# Patient Record
Sex: FEMALE | Race: WHITE | Employment: OTHER | URBAN - NONMETROPOLITAN AREA
[De-identification: names, ages, dates, MRNs, and addresses within clinical notes are randomized per-mention and may not be internally consistent; named-entity substitution may affect disease eponyms.]

---

## 2017-06-15 RX ORDER — DONEPEZIL HYDROCHLORIDE 10 MG/1
TABLET, FILM COATED ORAL
Qty: 30 TABLET | Refills: 3 | Status: SHIPPED | OUTPATIENT
Start: 2017-06-15 | End: 2017-10-18 | Stop reason: SDUPTHER

## 2017-06-22 RX ORDER — GABAPENTIN 300 MG/1
CAPSULE ORAL
Qty: 60 CAPSULE | Refills: 0 | Status: SHIPPED | OUTPATIENT
Start: 2017-06-22 | End: 2017-07-24 | Stop reason: SDUPTHER

## 2017-07-13 ENCOUNTER — TELEPHONE (OUTPATIENT)
Dept: INTERNAL MEDICINE | Age: 82
End: 2017-07-13

## 2017-07-14 ENCOUNTER — OFFICE VISIT (OUTPATIENT)
Dept: INTERNAL MEDICINE | Age: 82
End: 2017-07-14
Payer: MEDICARE

## 2017-07-14 VITALS
HEIGHT: 60 IN | WEIGHT: 159 LBS | RESPIRATION RATE: 18 BRPM | SYSTOLIC BLOOD PRESSURE: 122 MMHG | TEMPERATURE: 98.7 F | DIASTOLIC BLOOD PRESSURE: 74 MMHG | HEART RATE: 77 BPM | OXYGEN SATURATION: 94 % | BODY MASS INDEX: 31.22 KG/M2

## 2017-07-14 DIAGNOSIS — L89.90 DECUBITUS ULCER, UNSPECIFIED PRESSURE ULCER STAGE: ICD-10-CM

## 2017-07-14 DIAGNOSIS — B37.2 CANDIDIASIS OF SKIN: Primary | ICD-10-CM

## 2017-07-14 PROCEDURE — 1036F TOBACCO NON-USER: CPT | Performed by: NURSE PRACTITIONER

## 2017-07-14 PROCEDURE — G8417 CALC BMI ABV UP PARAM F/U: HCPCS | Performed by: NURSE PRACTITIONER

## 2017-07-14 PROCEDURE — G8400 PT W/DXA NO RESULTS DOC: HCPCS | Performed by: NURSE PRACTITIONER

## 2017-07-14 PROCEDURE — 1090F PRES/ABSN URINE INCON ASSESS: CPT | Performed by: NURSE PRACTITIONER

## 2017-07-14 PROCEDURE — 1123F ACP DISCUSS/DSCN MKR DOCD: CPT | Performed by: NURSE PRACTITIONER

## 2017-07-14 PROCEDURE — 4040F PNEUMOC VAC/ADMIN/RCVD: CPT | Performed by: NURSE PRACTITIONER

## 2017-07-14 PROCEDURE — G8427 DOCREV CUR MEDS BY ELIG CLIN: HCPCS | Performed by: NURSE PRACTITIONER

## 2017-07-14 PROCEDURE — 99213 OFFICE O/P EST LOW 20 MIN: CPT | Performed by: NURSE PRACTITIONER

## 2017-07-14 RX ORDER — NYSTATIN 100000 U/G
CREAM TOPICAL
Qty: 1 TUBE | Refills: 3 | Status: SHIPPED | OUTPATIENT
Start: 2017-07-14 | End: 2018-02-26 | Stop reason: CLARIF

## 2017-07-14 RX ORDER — OXYCODONE AND ACETAMINOPHEN 7.5; 325 MG/1; MG/1
1 TABLET ORAL EVERY 6 HOURS PRN
COMMUNITY

## 2017-07-14 RX ORDER — POTASSIUM CHLORIDE 20 MEQ/1
20 TABLET, EXTENDED RELEASE ORAL DAILY
COMMUNITY
End: 2017-08-23 | Stop reason: SDUPTHER

## 2017-07-14 RX ORDER — CHOLECALCIFEROL (VITAMIN D3) 1250 MCG
1 CAPSULE ORAL WEEKLY
COMMUNITY
End: 2017-08-04 | Stop reason: SDUPTHER

## 2017-07-14 RX ORDER — ALENDRONATE SODIUM 70 MG/1
70 TABLET ORAL
COMMUNITY
End: 2018-02-26 | Stop reason: CLARIF

## 2017-07-14 RX ORDER — NYSTATIN 100000 [USP'U]/G
POWDER TOPICAL
Qty: 1 BOTTLE | Refills: 3 | Status: SHIPPED | OUTPATIENT
Start: 2017-07-14

## 2017-07-14 RX ORDER — FLUCONAZOLE 100 MG/1
100 TABLET ORAL DAILY
Qty: 2 TABLET | Refills: 0 | Status: SHIPPED | OUTPATIENT
Start: 2017-07-14 | End: 2017-07-16

## 2017-07-14 RX ORDER — QUETIAPINE FUMARATE 25 MG/1
25 TABLET, FILM COATED ORAL NIGHTLY
COMMUNITY
End: 2017-08-17 | Stop reason: SDUPTHER

## 2017-07-14 ASSESSMENT — ENCOUNTER SYMPTOMS
SORE THROAT: 0
WHEEZING: 0
ABDOMINAL PAIN: 0
STRIDOR: 0
ABDOMINAL DISTENTION: 0
CHOKING: 0
COUGH: 0
CONSTIPATION: 0
COLOR CHANGE: 0
EYE ITCHING: 0
NAUSEA: 0
BLOOD IN STOOL: 0
DIARRHEA: 0
SHORTNESS OF BREATH: 0
TROUBLE SWALLOWING: 0
VOMITING: 0
EYE DISCHARGE: 0

## 2017-07-14 ASSESSMENT — PATIENT HEALTH QUESTIONNAIRE - PHQ9
1. LITTLE INTEREST OR PLEASURE IN DOING THINGS: 0
2. FEELING DOWN, DEPRESSED OR HOPELESS: 0
SUM OF ALL RESPONSES TO PHQ QUESTIONS 1-9: 0
SUM OF ALL RESPONSES TO PHQ9 QUESTIONS 1 & 2: 0

## 2017-07-25 RX ORDER — GABAPENTIN 300 MG/1
CAPSULE ORAL
Qty: 60 CAPSULE | Refills: 0 | Status: SHIPPED | OUTPATIENT
Start: 2017-07-25 | End: 2017-08-24 | Stop reason: SDUPTHER

## 2017-08-04 RX ORDER — CHOLECALCIFEROL (VITAMIN D3) 1250 MCG
1 CAPSULE ORAL WEEKLY
Qty: 8 CAPSULE | Refills: 1 | Status: SHIPPED | OUTPATIENT
Start: 2017-08-04

## 2017-08-17 RX ORDER — QUETIAPINE FUMARATE 25 MG/1
TABLET, FILM COATED ORAL
Qty: 90 TABLET | Refills: 0 | Status: SHIPPED | OUTPATIENT
Start: 2017-08-17 | End: 2017-08-29 | Stop reason: SDUPTHER

## 2017-08-24 RX ORDER — GABAPENTIN 300 MG/1
CAPSULE ORAL
Qty: 60 CAPSULE | Refills: 0 | Status: SHIPPED | OUTPATIENT
Start: 2017-08-24 | End: 2017-09-21 | Stop reason: SDUPTHER

## 2017-08-24 RX ORDER — POTASSIUM CHLORIDE 20 MEQ/1
TABLET, EXTENDED RELEASE ORAL
Qty: 90 TABLET | Refills: 0 | Status: SHIPPED | OUTPATIENT
Start: 2017-08-24 | End: 2017-11-22 | Stop reason: SDUPTHER

## 2017-08-29 RX ORDER — QUETIAPINE FUMARATE 25 MG/1
TABLET, FILM COATED ORAL
Qty: 90 TABLET | Refills: 1 | Status: SHIPPED | OUTPATIENT
Start: 2017-08-29

## 2017-09-11 ENCOUNTER — TELEPHONE (OUTPATIENT)
Dept: INTERNAL MEDICINE | Age: 82
End: 2017-09-11

## 2017-09-11 DIAGNOSIS — N39.0 URINARY TRACT INFECTION WITHOUT HEMATURIA, SITE UNSPECIFIED: Primary | ICD-10-CM

## 2017-09-11 DIAGNOSIS — N39.0 URINARY TRACT INFECTION WITHOUT HEMATURIA, SITE UNSPECIFIED: ICD-10-CM

## 2017-09-11 LAB
BILIRUBIN URINE: NEGATIVE
BLOOD, URINE: NEGATIVE
CLARITY: CLEAR
COLOR: YELLOW
GLUCOSE URINE: NEGATIVE MG/DL
KETONES, URINE: NEGATIVE MG/DL
LEUKOCYTE ESTERASE, URINE: NEGATIVE
NITRITE, URINE: NEGATIVE
PH UA: 5
PROTEIN UA: NEGATIVE MG/DL
SPECIFIC GRAVITY UA: 1.01
UROBILINOGEN, URINE: 0.2 E.U./DL

## 2017-09-11 RX ORDER — CEFUROXIME AXETIL 250 MG/1
250 TABLET ORAL 2 TIMES DAILY
Qty: 14 TABLET | Refills: 0 | Status: SHIPPED | OUTPATIENT
Start: 2017-09-11 | End: 2017-09-18

## 2017-09-11 RX ORDER — FLUCONAZOLE 100 MG/1
100 TABLET ORAL DAILY
Qty: 7 TABLET | Refills: 0 | Status: SHIPPED | OUTPATIENT
Start: 2017-09-11 | End: 2017-09-18

## 2017-09-13 LAB
ORGANISM: ABNORMAL
URINE CULTURE, ROUTINE: ABNORMAL
URINE CULTURE, ROUTINE: ABNORMAL

## 2017-09-14 DIAGNOSIS — E78.00 PURE HYPERCHOLESTEROLEMIA: ICD-10-CM

## 2017-09-14 DIAGNOSIS — Z09 FOLLOW UP: ICD-10-CM

## 2017-09-14 DIAGNOSIS — E03.9 ACQUIRED HYPOTHYROIDISM: ICD-10-CM

## 2017-09-14 DIAGNOSIS — E11.00 TYPE 2 DIABETES MELLITUS WITH HYPEROSMOLARITY WITHOUT COMA, WITHOUT LONG-TERM CURRENT USE OF INSULIN (HCC): ICD-10-CM

## 2017-09-14 DIAGNOSIS — E55.9 VITAMIN D DEFICIENCY: ICD-10-CM

## 2017-09-14 DIAGNOSIS — I10 ESSENTIAL HYPERTENSION: ICD-10-CM

## 2017-09-14 PROBLEM — E11.9 TYPE 2 DIABETES MELLITUS (HCC): Status: ACTIVE | Noted: 2017-09-14

## 2017-09-14 PROBLEM — E78.5 HYPERLIPIDEMIA: Status: ACTIVE | Noted: 2017-09-14

## 2017-09-14 RX ORDER — CEFUROXIME AXETIL 250 MG/1
250 TABLET ORAL 2 TIMES DAILY
Qty: 14 TABLET | Refills: 0 | Status: SHIPPED | OUTPATIENT
Start: 2017-09-14 | End: 2017-09-21

## 2017-09-16 PROBLEM — M54.50 CHRONIC MIDLINE LOW BACK PAIN WITHOUT SCIATICA: Status: ACTIVE | Noted: 2017-09-16

## 2017-09-16 PROBLEM — G89.29 CHRONIC MIDLINE LOW BACK PAIN WITHOUT SCIATICA: Status: ACTIVE | Noted: 2017-09-16

## 2017-09-16 PROBLEM — R41.3 MEMORY LOSS: Status: ACTIVE | Noted: 2017-09-16

## 2017-09-16 PROBLEM — I25.10 CORONARY ARTERY DISEASE INVOLVING NATIVE CORONARY ARTERY OF NATIVE HEART WITHOUT ANGINA PECTORIS: Status: ACTIVE | Noted: 2017-09-16

## 2017-09-16 PROBLEM — M81.0 AGE-RELATED OSTEOPOROSIS WITHOUT CURRENT PATHOLOGICAL FRACTURE: Status: ACTIVE | Noted: 2017-09-16

## 2017-09-16 PROBLEM — I35.0 NONRHEUMATIC AORTIC VALVE STENOSIS: Status: ACTIVE | Noted: 2017-09-16

## 2017-09-16 PROBLEM — S32.050A CLOSED COMPRESSION FRACTURE OF FIFTH LUMBAR VERTEBRA (HCC): Status: ACTIVE | Noted: 2017-09-16

## 2017-09-16 PROBLEM — E78.2 MIXED HYPERLIPIDEMIA: Status: ACTIVE | Noted: 2017-09-14

## 2017-09-19 DIAGNOSIS — E78.00 PURE HYPERCHOLESTEROLEMIA: ICD-10-CM

## 2017-09-19 DIAGNOSIS — E03.9 ACQUIRED HYPOTHYROIDISM: ICD-10-CM

## 2017-09-19 DIAGNOSIS — E55.9 VITAMIN D DEFICIENCY: ICD-10-CM

## 2017-09-19 DIAGNOSIS — E11.00 TYPE 2 DIABETES MELLITUS WITH HYPEROSMOLARITY WITHOUT COMA, WITHOUT LONG-TERM CURRENT USE OF INSULIN (HCC): ICD-10-CM

## 2017-09-19 DIAGNOSIS — Z09 FOLLOW UP: ICD-10-CM

## 2017-09-19 DIAGNOSIS — I10 ESSENTIAL HYPERTENSION: ICD-10-CM

## 2017-09-19 LAB
ALBUMIN SERPL-MCNC: 3.9 G/DL (ref 3.5–5.2)
ALP BLD-CCNC: 49 U/L (ref 35–104)
ALT SERPL-CCNC: 9 U/L (ref 5–33)
ANION GAP SERPL CALCULATED.3IONS-SCNC: 18 MMOL/L (ref 7–19)
AST SERPL-CCNC: 15 U/L (ref 5–32)
BILIRUB SERPL-MCNC: 0.4 MG/DL (ref 0.2–1.2)
BUN BLDV-MCNC: 13 MG/DL (ref 8–23)
CALCIUM SERPL-MCNC: 9.3 MG/DL (ref 8.8–10.2)
CHLORIDE BLD-SCNC: 106 MMOL/L (ref 98–111)
CHOLESTEROL, TOTAL: 170 MG/DL (ref 160–199)
CO2: 25 MMOL/L (ref 22–29)
CREAT SERPL-MCNC: 0.7 MG/DL (ref 0.5–0.9)
GFR NON-AFRICAN AMERICAN: >60
GLUCOSE BLD-MCNC: 125 MG/DL (ref 74–109)
HBA1C MFR BLD: 7.3 %
HDLC SERPL-MCNC: 62 MG/DL (ref 65–121)
LDL CHOLESTEROL CALCULATED: 76 MG/DL
POTASSIUM SERPL-SCNC: 3.8 MMOL/L (ref 3.5–5)
SODIUM BLD-SCNC: 149 MMOL/L (ref 136–145)
T4 FREE: 1 NG/DL (ref 0.9–1.7)
TOTAL PROTEIN: 6.5 G/DL (ref 6.6–8.7)
TRIGL SERPL-MCNC: 162 MG/DL (ref 150–199)
TSH SERPL DL<=0.05 MIU/L-ACNC: 3.04 UIU/ML (ref 0.27–4.2)

## 2017-09-20 RX ORDER — ROSUVASTATIN CALCIUM 20 MG/1
TABLET, COATED ORAL
Qty: 30 TABLET | Refills: 0 | Status: SHIPPED | OUTPATIENT
Start: 2017-09-20 | End: 2017-10-18 | Stop reason: SDUPTHER

## 2017-09-22 LAB
VITAMIN D2 AND D3, TOTAL: 53.5 NG/ML (ref 30–80)
VITAMIN D2, 25 HYDROXY: 51 NG/ML
VITAMIN D3,25 HYDROXY: 2.5 NG/ML

## 2017-09-22 RX ORDER — GABAPENTIN 300 MG/1
CAPSULE ORAL
Qty: 60 CAPSULE | Refills: 2 | Status: SHIPPED | OUTPATIENT
Start: 2017-09-22 | End: 2017-12-21 | Stop reason: SDUPTHER

## 2017-09-29 ENCOUNTER — OFFICE VISIT (OUTPATIENT)
Dept: INTERNAL MEDICINE | Age: 82
End: 2017-09-29
Payer: MEDICARE

## 2017-09-29 VITALS
RESPIRATION RATE: 20 BRPM | WEIGHT: 160 LBS | OXYGEN SATURATION: 96 % | HEIGHT: 62 IN | SYSTOLIC BLOOD PRESSURE: 134 MMHG | DIASTOLIC BLOOD PRESSURE: 80 MMHG | BODY MASS INDEX: 29.44 KG/M2 | HEART RATE: 71 BPM

## 2017-09-29 DIAGNOSIS — I10 ESSENTIAL HYPERTENSION: ICD-10-CM

## 2017-09-29 DIAGNOSIS — L03.011 PARONYCHIA OF FINGER, RIGHT: ICD-10-CM

## 2017-09-29 DIAGNOSIS — E55.9 VITAMIN D DEFICIENCY: ICD-10-CM

## 2017-09-29 DIAGNOSIS — E11.42 TYPE 2 DIABETES MELLITUS WITH DIABETIC POLYNEUROPATHY, WITHOUT LONG-TERM CURRENT USE OF INSULIN (HCC): Primary | ICD-10-CM

## 2017-09-29 DIAGNOSIS — Z23 IMMUNIZATION DUE: ICD-10-CM

## 2017-09-29 DIAGNOSIS — E78.2 MIXED HYPERLIPIDEMIA: ICD-10-CM

## 2017-09-29 DIAGNOSIS — S32.050A: ICD-10-CM

## 2017-09-29 PROBLEM — E11.9 TYPE 2 DIABETES MELLITUS, WITHOUT LONG-TERM CURRENT USE OF INSULIN (HCC): Status: RESOLVED | Noted: 2017-09-14 | Resolved: 2017-09-29

## 2017-09-29 PROCEDURE — G8598 ASA/ANTIPLAT THER USED: HCPCS | Performed by: INTERNAL MEDICINE

## 2017-09-29 PROCEDURE — 99214 OFFICE O/P EST MOD 30 MIN: CPT | Performed by: INTERNAL MEDICINE

## 2017-09-29 PROCEDURE — 1036F TOBACCO NON-USER: CPT | Performed by: INTERNAL MEDICINE

## 2017-09-29 PROCEDURE — G0008 ADMIN INFLUENZA VIRUS VAC: HCPCS | Performed by: INTERNAL MEDICINE

## 2017-09-29 PROCEDURE — 90630 INFLUENZA, QUADV, 18-64 YRS, ID, PF, MICRO INJ, 0.1ML (FLUZONE QUADV, PF): CPT | Performed by: INTERNAL MEDICINE

## 2017-09-29 PROCEDURE — G8427 DOCREV CUR MEDS BY ELIG CLIN: HCPCS | Performed by: INTERNAL MEDICINE

## 2017-09-29 PROCEDURE — 1090F PRES/ABSN URINE INCON ASSESS: CPT | Performed by: INTERNAL MEDICINE

## 2017-09-29 PROCEDURE — G8400 PT W/DXA NO RESULTS DOC: HCPCS | Performed by: INTERNAL MEDICINE

## 2017-09-29 PROCEDURE — 4040F PNEUMOC VAC/ADMIN/RCVD: CPT | Performed by: INTERNAL MEDICINE

## 2017-09-29 PROCEDURE — G8417 CALC BMI ABV UP PARAM F/U: HCPCS | Performed by: INTERNAL MEDICINE

## 2017-09-29 PROCEDURE — 1123F ACP DISCUSS/DSCN MKR DOCD: CPT | Performed by: INTERNAL MEDICINE

## 2017-09-29 RX ORDER — GLIPIZIDE 5 MG/1
5 TABLET ORAL DAILY
Qty: 30 TABLET | Refills: 3 | Status: SHIPPED | OUTPATIENT
Start: 2017-09-29 | End: 2018-02-26 | Stop reason: CLARIF

## 2017-09-29 RX ORDER — METFORMIN HYDROCHLORIDE 500 MG/1
500 TABLET, EXTENDED RELEASE ORAL EVERY EVENING
Qty: 30 TABLET | Refills: 5 | Status: SHIPPED | OUTPATIENT
Start: 2017-09-29 | End: 2018-05-16 | Stop reason: SDUPTHER

## 2017-09-29 ASSESSMENT — PATIENT HEALTH QUESTIONNAIRE - PHQ9
1. LITTLE INTEREST OR PLEASURE IN DOING THINGS: 0
SUM OF ALL RESPONSES TO PHQ QUESTIONS 1-9: 0
SUM OF ALL RESPONSES TO PHQ9 QUESTIONS 1 & 2: 0
2. FEELING DOWN, DEPRESSED OR HOPELESS: 0

## 2017-09-29 ASSESSMENT — ENCOUNTER SYMPTOMS
CONSTIPATION: 0
COUGH: 0
ABDOMINAL PAIN: 0
CHEST TIGHTNESS: 0
SORE THROAT: 0
WHEEZING: 0
BACK PAIN: 1
COLOR CHANGE: 1

## 2017-10-09 RX ORDER — ALENDRONATE SODIUM 70 MG/1
TABLET ORAL
Qty: 4 TABLET | Refills: 0 | Status: SHIPPED | OUTPATIENT
Start: 2017-10-09 | End: 2018-02-26 | Stop reason: CLARIF

## 2017-10-11 RX ORDER — ALENDRONATE SODIUM 70 MG/1
TABLET ORAL
Qty: 4 TABLET | Refills: 5 | Status: SHIPPED | OUTPATIENT
Start: 2017-10-11

## 2017-10-19 RX ORDER — ROSUVASTATIN CALCIUM 20 MG/1
TABLET, COATED ORAL
Qty: 30 TABLET | Refills: 0 | Status: SHIPPED | OUTPATIENT
Start: 2017-10-19 | End: 2017-11-16 | Stop reason: SDUPTHER

## 2017-10-19 RX ORDER — DONEPEZIL HYDROCHLORIDE 10 MG/1
TABLET, FILM COATED ORAL
Qty: 30 TABLET | Refills: 0 | Status: SHIPPED | OUTPATIENT
Start: 2017-10-19 | End: 2017-11-16 | Stop reason: SDUPTHER

## 2017-11-08 RX ORDER — FUROSEMIDE 40 MG/1
TABLET ORAL
Qty: 30 TABLET | Refills: 0 | Status: SHIPPED | OUTPATIENT
Start: 2017-11-08 | End: 2017-12-07 | Stop reason: SDUPTHER

## 2017-11-16 RX ORDER — ROSUVASTATIN CALCIUM 20 MG/1
TABLET, COATED ORAL
Qty: 30 TABLET | Refills: 2 | Status: SHIPPED | OUTPATIENT
Start: 2017-11-16 | End: 2018-03-14 | Stop reason: SDUPTHER

## 2017-11-16 RX ORDER — DONEPEZIL HYDROCHLORIDE 10 MG/1
TABLET, FILM COATED ORAL
Qty: 30 TABLET | Refills: 2 | Status: SHIPPED | OUTPATIENT
Start: 2017-11-16 | End: 2018-04-13 | Stop reason: SDUPTHER

## 2017-11-16 RX ORDER — ERGOCALCIFEROL 1.25 MG/1
CAPSULE ORAL
Qty: 4 CAPSULE | Refills: 2 | Status: SHIPPED | OUTPATIENT
Start: 2017-11-16 | End: 2018-03-01 | Stop reason: SDUPTHER

## 2017-11-27 RX ORDER — POTASSIUM CHLORIDE 20 MEQ/1
TABLET, EXTENDED RELEASE ORAL
Qty: 90 TABLET | Refills: 3 | Status: SHIPPED | OUTPATIENT
Start: 2017-11-27 | End: 2018-02-27 | Stop reason: SDUPTHER

## 2017-12-08 RX ORDER — FUROSEMIDE 40 MG/1
TABLET ORAL
Qty: 30 TABLET | Refills: 0 | Status: SHIPPED | OUTPATIENT
Start: 2017-12-08 | End: 2018-02-21 | Stop reason: SDUPTHER

## 2017-12-10 ENCOUNTER — HOSPITAL ENCOUNTER (EMERGENCY)
Facility: HOSPITAL | Age: 82
Discharge: HOME | End: 2017-12-10
Attending: EMERGENCY MEDICINE | Admitting: EMERGENCY MEDICINE

## 2017-12-10 ENCOUNTER — APPOINTMENT (OUTPATIENT)
Dept: GENERAL RADIOLOGY | Facility: HOSPITAL | Age: 82
End: 2017-12-10

## 2017-12-10 VITALS
OXYGEN SATURATION: 93 % | RESPIRATION RATE: 18 BRPM | TEMPERATURE: 97.1 F | BODY MASS INDEX: 29.45 KG/M2 | SYSTOLIC BLOOD PRESSURE: 124 MMHG | WEIGHT: 150 LBS | HEIGHT: 60 IN | HEART RATE: 84 BPM | DIASTOLIC BLOOD PRESSURE: 59 MMHG

## 2017-12-10 DIAGNOSIS — N34.2 INFECTIVE URETHRITIS: Primary | ICD-10-CM

## 2017-12-10 LAB
ALBUMIN SERPL-MCNC: 3.9 G/DL (ref 3.5–5)
ALBUMIN/GLOB SERPL: 1.6 G/DL (ref 1.1–2.5)
ALP SERPL-CCNC: 47 U/L (ref 24–120)
ALT SERPL W P-5'-P-CCNC: 33 U/L (ref 0–54)
ANION GAP SERPL CALCULATED.3IONS-SCNC: 13 MMOL/L (ref 4–13)
AST SERPL-CCNC: 24 U/L (ref 7–45)
BACTERIA UR QL AUTO: ABNORMAL /HPF
BASOPHILS # BLD AUTO: 0.03 10*3/MM3 (ref 0–0.2)
BASOPHILS NFR BLD AUTO: 0.7 % (ref 0–2)
BILIRUB SERPL-MCNC: 0.6 MG/DL (ref 0.1–1)
BILIRUB UR QL STRIP: NEGATIVE
BUN BLD-MCNC: 15 MG/DL (ref 5–21)
BUN/CREAT SERPL: 15.8 (ref 7–25)
CALCIUM SPEC-SCNC: 8.8 MG/DL (ref 8.4–10.4)
CHLORIDE SERPL-SCNC: 102 MMOL/L (ref 98–110)
CLARITY UR: CLEAR
CO2 SERPL-SCNC: 28 MMOL/L (ref 24–31)
COARSE GRAN CASTS URNS QL MICRO: ABNORMAL /LPF
COLOR UR: YELLOW
CREAT BLD-MCNC: 0.95 MG/DL (ref 0.5–1.4)
DEPRECATED RDW RBC AUTO: 43.7 FL (ref 40–54)
EOSINOPHIL # BLD AUTO: 0.2 10*3/MM3 (ref 0–0.7)
EOSINOPHIL NFR BLD AUTO: 4.5 % (ref 0–4)
ERYTHROCYTE [DISTWIDTH] IN BLOOD BY AUTOMATED COUNT: 13.7 % (ref 12–15)
GFR SERPL CREATININE-BSD FRML MDRD: 57 ML/MIN/1.73
GLOBULIN UR ELPH-MCNC: 2.4 GM/DL
GLUCOSE BLD-MCNC: 103 MG/DL (ref 70–100)
GLUCOSE UR STRIP-MCNC: NEGATIVE MG/DL
HCT VFR BLD AUTO: 42.1 % (ref 37–47)
HGB BLD-MCNC: 13.9 G/DL (ref 12–16)
HGB UR QL STRIP.AUTO: ABNORMAL
HOLD SPECIMEN: NORMAL
HYALINE CASTS UR QL AUTO: ABNORMAL /LPF
IMM GRANULOCYTES # BLD: 0.01 10*3/MM3 (ref 0–0.03)
IMM GRANULOCYTES NFR BLD: 0.2 % (ref 0–5)
KETONES UR QL STRIP: NEGATIVE
LEUKOCYTE ESTERASE UR QL STRIP.AUTO: NEGATIVE
LYMPHOCYTES # BLD AUTO: 0.4 10*3/MM3 (ref 0.72–4.86)
LYMPHOCYTES NFR BLD AUTO: 8.9 % (ref 15–45)
MCH RBC QN AUTO: 28.7 PG (ref 28–32)
MCHC RBC AUTO-ENTMCNC: 33 G/DL (ref 33–36)
MCV RBC AUTO: 87 FL (ref 82–98)
MONOCYTES # BLD AUTO: 0.27 10*3/MM3 (ref 0.19–1.3)
MONOCYTES NFR BLD AUTO: 6 % (ref 4–12)
NEUTROPHILS # BLD AUTO: 3.57 10*3/MM3 (ref 1.87–8.4)
NEUTROPHILS NFR BLD AUTO: 79.7 % (ref 39–78)
NITRITE UR QL STRIP: POSITIVE
NRBC BLD MANUAL-RTO: 0 /100 WBC (ref 0–0)
PH UR STRIP.AUTO: 5.5 [PH] (ref 5–8)
PLATELET # BLD AUTO: 125 10*3/MM3 (ref 130–400)
PMV BLD AUTO: 11.4 FL (ref 6–12)
POTASSIUM BLD-SCNC: 3.9 MMOL/L (ref 3.5–5.3)
PROT SERPL-MCNC: 6.3 G/DL (ref 6.3–8.7)
PROT UR QL STRIP: ABNORMAL
RBC # BLD AUTO: 4.84 10*6/MM3 (ref 4.2–5.4)
RBC # UR: ABNORMAL /HPF
REF LAB TEST METHOD: ABNORMAL
SODIUM BLD-SCNC: 143 MMOL/L (ref 135–145)
SP GR UR STRIP: 1.01 (ref 1–1.03)
SQUAMOUS #/AREA URNS HPF: ABNORMAL /HPF
TROPONIN I SERPL-MCNC: <0.012 NG/ML (ref 0–0.03)
UROBILINOGEN UR QL STRIP: ABNORMAL
WBC NRBC COR # BLD: 4.48 10*3/MM3 (ref 4.8–10.8)
WBC UR QL AUTO: ABNORMAL /HPF
WHOLE BLOOD HOLD SPECIMEN: NORMAL
WHOLE BLOOD HOLD SPECIMEN: NORMAL

## 2017-12-10 PROCEDURE — 93010 ELECTROCARDIOGRAM REPORT: CPT | Performed by: INTERNAL MEDICINE

## 2017-12-10 PROCEDURE — 87186 SC STD MICRODIL/AGAR DIL: CPT | Performed by: EMERGENCY MEDICINE

## 2017-12-10 PROCEDURE — 87086 URINE CULTURE/COLONY COUNT: CPT | Performed by: EMERGENCY MEDICINE

## 2017-12-10 PROCEDURE — 85025 COMPLETE CBC W/AUTO DIFF WBC: CPT | Performed by: EMERGENCY MEDICINE

## 2017-12-10 PROCEDURE — 96361 HYDRATE IV INFUSION ADD-ON: CPT

## 2017-12-10 PROCEDURE — 25010000002 CEFTRIAXONE PER 250 MG: Performed by: EMERGENCY MEDICINE

## 2017-12-10 PROCEDURE — P9612 CATHETERIZE FOR URINE SPEC: HCPCS

## 2017-12-10 PROCEDURE — 81001 URINALYSIS AUTO W/SCOPE: CPT | Performed by: EMERGENCY MEDICINE

## 2017-12-10 PROCEDURE — 96365 THER/PROPH/DIAG IV INF INIT: CPT

## 2017-12-10 PROCEDURE — 80053 COMPREHEN METABOLIC PANEL: CPT | Performed by: EMERGENCY MEDICINE

## 2017-12-10 PROCEDURE — 93005 ELECTROCARDIOGRAM TRACING: CPT | Performed by: EMERGENCY MEDICINE

## 2017-12-10 PROCEDURE — 99285 EMERGENCY DEPT VISIT HI MDM: CPT

## 2017-12-10 PROCEDURE — 71010 HC CHEST PA OR AP: CPT

## 2017-12-10 PROCEDURE — 84484 ASSAY OF TROPONIN QUANT: CPT | Performed by: EMERGENCY MEDICINE

## 2017-12-10 PROCEDURE — 87077 CULTURE AEROBIC IDENTIFY: CPT | Performed by: EMERGENCY MEDICINE

## 2017-12-10 PROCEDURE — 96366 THER/PROPH/DIAG IV INF ADDON: CPT

## 2017-12-10 RX ORDER — POTASSIUM CHLORIDE 1.5 G/1.77G
20 POWDER, FOR SOLUTION ORAL DAILY
Status: ON HOLD | COMMUNITY
End: 2017-12-29 | Stop reason: DRUGHIGH

## 2017-12-10 RX ORDER — QUETIAPINE FUMARATE 25 MG/1
25 TABLET, FILM COATED ORAL NIGHTLY
COMMUNITY
End: 2018-01-04 | Stop reason: HOSPADM

## 2017-12-10 RX ORDER — ROSUVASTATIN CALCIUM 20 MG/1
20 TABLET, COATED ORAL DAILY
COMMUNITY

## 2017-12-10 RX ORDER — ALENDRONATE SODIUM 70 MG/1
70 TABLET ORAL
COMMUNITY
End: 2018-05-18 | Stop reason: HOSPADM

## 2017-12-10 RX ORDER — CIPROFLOXACIN 500 MG/1
500 TABLET, FILM COATED ORAL 2 TIMES DAILY
Qty: 20 TABLET | Refills: 0 | Status: ON HOLD | OUTPATIENT
Start: 2017-12-10 | End: 2017-12-29

## 2017-12-10 RX ORDER — FUROSEMIDE 40 MG/1
40 TABLET ORAL DAILY
COMMUNITY
End: 2018-05-18 | Stop reason: HOSPADM

## 2017-12-10 RX ORDER — GABAPENTIN 400 MG/1
400 CAPSULE ORAL 2 TIMES DAILY
Status: ON HOLD | COMMUNITY
End: 2017-12-29 | Stop reason: DRUGHIGH

## 2017-12-10 RX ORDER — ERGOCALCIFEROL 1.25 MG/1
50000 CAPSULE ORAL WEEKLY
COMMUNITY
End: 2018-05-18 | Stop reason: HOSPADM

## 2017-12-10 RX ORDER — GLIPIZIDE 5 MG/1
5 TABLET ORAL DAILY
COMMUNITY
End: 2018-01-04 | Stop reason: HOSPADM

## 2017-12-10 RX ORDER — ASPIRIN 81 MG/1
81 TABLET ORAL DAILY
COMMUNITY

## 2017-12-10 RX ORDER — DONEPEZIL HYDROCHLORIDE 10 MG/1
10 TABLET, FILM COATED ORAL NIGHTLY
COMMUNITY
End: 2018-05-18 | Stop reason: HOSPADM

## 2017-12-10 RX ADMIN — SODIUM CHLORIDE 500 ML: 0.9 INJECTION, SOLUTION INTRAVENOUS at 11:56

## 2017-12-10 RX ADMIN — CEFTRIAXONE 1 G: 1 INJECTION, SOLUTION INTRAVENOUS at 14:37

## 2017-12-10 NOTE — ED PROVIDER NOTES
Subjective   HPI Comments: 82-year-old female presents to our facility via EMS accompanied with family member at bedside her daughter who is the primary historian at this time.    She stays at an assisted living and over the last several days has been noticing that she has decreased appetite and the daughter sees her mom on a weekly basis noticed today that she is not responding and not as talkative as she normally is.    The patient herself is not really complaining of anything specific she makes great eye contact very pleasant and happily confused is oriented to person but not place or time.    The daughter states that she does have frequent UTIs that has caused some confusion in the past.    There is a questionable fever.  Daughter feels there is no vomiting.  There is decrease in appetite.    Patient is a 82 y.o. female presenting with altered mental status.   History provided by:  Patient  History limited by:  Acuity of condition   used: No    Altered Mental Status   Severity:  Mild  Most recent episode:  2 days ago  Timing:  Constant  Progression:  Worsening  Chronicity:  New  Context: not head injury    Associated symptoms: no abdominal pain, no bladder incontinence, no eye deviation, no fever, no rash, no seizures, no slurred speech and no vomiting        Review of Systems   Constitutional: Negative for fever.   Gastrointestinal: Negative for abdominal pain and vomiting.   Genitourinary: Negative for bladder incontinence.   Skin: Negative for rash.   Neurological: Negative for seizures.   All other systems reviewed and are negative.      Past Medical History:   Diagnosis Date   • CHF (congestive heart failure)    • Diabetes mellitus        No Known Allergies    Past Surgical History:   Procedure Laterality Date   • AORTIC VALVE REPAIR/REPLACEMENT         History reviewed. No pertinent family history.    Social History     Social History   • Marital status:      Spouse name: N/A   •  Number of children: N/A   • Years of education: N/A     Social History Main Topics   • Smoking status: Former Smoker     Years: 20.00   • Smokeless tobacco: Never Used   • Alcohol use No   • Drug use: No   • Sexual activity: Not Asked     Other Topics Concern   • None     Social History Narrative   • None           Objective   Physical Exam   Constitutional: She appears well-developed and well-nourished.   Alert oriented to person but not to place or time.  She is happily confused.  She is making good eye contact attempting to answer my questions.   HENT:   Head: Normocephalic and atraumatic.   Nose: Nose normal.   Eyes: Conjunctivae and EOM are normal. Pupils are equal, round, and reactive to light.   Neck: Normal range of motion. Neck supple. No JVD present. No tracheal deviation present. No thyromegaly present.   Cardiovascular: Regular rhythm.    Pulmonary/Chest: Effort normal and breath sounds normal.   Abdominal: Soft. Bowel sounds are normal. There is no tenderness.   Musculoskeletal: Normal range of motion.   Lymphadenopathy:     She has no cervical adenopathy.   Neurological: She is alert. She has normal reflexes.   Skin: Skin is warm and dry.   Psychiatric: She has a normal mood and affect.   Nursing note and vitals reviewed.      Procedures         ED Course  ED Course   Comment By Time   EKG 12-lead @ 1139.  Interpreted @ 1142  reveals a rate of sinus at 76 per minute with unifocal PVCs.  Acute ST changes. Jaja SHARMA MD 12/10 1200   On repeat examination she is comfortable she is awake she is oriented to person and place but not time she is conversive slightly more than when she came in.  Daughters at bedside she feels that she is almost back to her baseline at this time.  She received 500 mL of fluids and 1 dose of antibiotics here in the department. Jaja SHARMA MD 12/10 1555                  MDM  Number of Diagnoses or Management Options  Infective urethritis: new and requires workup  Diagnosis  management comments: The initial presentation of change in mental status has significantly improved after the fluids and the first dose of antibiotics.  I have not obtained a CT of the head at this time there is no indication.  I elect lites are within normal limits she does have a UTI her white count was normal there is no left shift in the chest x-ray shows no evidence of infiltrate.  At this time we will treat the UTI with oral antibiotic she has good follow-up at the assisted living       Amount and/or Complexity of Data Reviewed  Clinical lab tests: reviewed and ordered  Tests in the radiology section of CPT®: reviewed and ordered    Risk of Complications, Morbidity, and/or Mortality  Presenting problems: high  Diagnostic procedures: high  Management options: high    Patient Progress  Patient progress: stable      Final diagnoses:   Infective urethritis            Jaja SHARMA MD  12/10/17 1620       Jaja SHARMA MD  12/10/17 4402

## 2017-12-12 LAB
BACTERIA SPEC AEROBE CULT: ABNORMAL
BACTERIA SPEC AEROBE CULT: ABNORMAL

## 2017-12-15 ENCOUNTER — TELEPHONE (OUTPATIENT)
Dept: EMERGENCY DEPT | Facility: HOSPITAL | Age: 82
End: 2017-12-15

## 2017-12-15 NOTE — TELEPHONE ENCOUNTER
----- Message from Piero Easley PA-C sent at 12/12/2017  1:12 PM CST -----  UTI with culture showing resistance to Cipro that patient was placed on.  Please call patient and change antibiotic to Bactrim DS PO BID x 7 days

## 2017-12-18 ENCOUNTER — TELEPHONE (OUTPATIENT)
Dept: EMERGENCY DEPT | Facility: HOSPITAL | Age: 82
End: 2017-12-18

## 2017-12-22 RX ORDER — GABAPENTIN 300 MG/1
CAPSULE ORAL
Qty: 60 CAPSULE | Refills: 2 | Status: SHIPPED | OUTPATIENT
Start: 2017-12-22 | End: 2018-05-16 | Stop reason: SDUPTHER

## 2017-12-28 ENCOUNTER — HOSPITAL ENCOUNTER (INPATIENT)
Facility: HOSPITAL | Age: 82
LOS: 7 days | Discharge: NURSING, REHAB OR LONG-TERM CARE | End: 2018-01-04
Attending: INTERNAL MEDICINE | Admitting: FAMILY MEDICINE

## 2017-12-28 ENCOUNTER — APPOINTMENT (OUTPATIENT)
Dept: NUCLEAR MEDICINE | Facility: HOSPITAL | Age: 82
End: 2017-12-28

## 2017-12-28 ENCOUNTER — APPOINTMENT (OUTPATIENT)
Dept: CT IMAGING | Facility: HOSPITAL | Age: 82
End: 2017-12-28

## 2017-12-28 ENCOUNTER — APPOINTMENT (OUTPATIENT)
Dept: GENERAL RADIOLOGY | Facility: HOSPITAL | Age: 82
End: 2017-12-28

## 2017-12-28 DIAGNOSIS — Z78.9 IMPAIRED MOBILITY AND ADLS: ICD-10-CM

## 2017-12-28 DIAGNOSIS — Z74.09 IMPAIRED FUNCTIONAL MOBILITY, BALANCE, GAIT, AND ENDURANCE: ICD-10-CM

## 2017-12-28 DIAGNOSIS — R41.82 ALTERED MENTAL STATUS, UNSPECIFIED ALTERED MENTAL STATUS TYPE: ICD-10-CM

## 2017-12-28 DIAGNOSIS — R53.1 WEAKNESS: ICD-10-CM

## 2017-12-28 DIAGNOSIS — R30.0 DYSURIA: Primary | ICD-10-CM

## 2017-12-28 DIAGNOSIS — Z74.09 IMPAIRED MOBILITY AND ADLS: ICD-10-CM

## 2017-12-28 DIAGNOSIS — N17.9 AKI (ACUTE KIDNEY INJURY) (HCC): Primary | ICD-10-CM

## 2017-12-28 DIAGNOSIS — R09.02 HYPOXEMIA: ICD-10-CM

## 2017-12-28 DIAGNOSIS — E86.0 DEHYDRATION: ICD-10-CM

## 2017-12-28 LAB
ALBUMIN SERPL-MCNC: 4.6 G/DL (ref 3.5–5)
ALBUMIN/GLOB SERPL: 1.4 G/DL (ref 1.1–2.5)
ALP SERPL-CCNC: 55 U/L (ref 24–120)
ALT SERPL W P-5'-P-CCNC: 39 U/L (ref 0–54)
AMYLASE SERPL-CCNC: 40 U/L (ref 30–110)
ANION GAP SERPL CALCULATED.3IONS-SCNC: 19 MMOL/L (ref 4–13)
APTT PPP: 34.3 SECONDS (ref 24.1–34.8)
ARTERIAL PATENCY WRIST A: ABNORMAL
AST SERPL-CCNC: 42 U/L (ref 7–45)
ATMOSPHERIC PRESS: 762 MMHG
BACTERIA UR QL AUTO: ABNORMAL /HPF
BASE EXCESS BLDA CALC-SCNC: -3.3 MMOL/L (ref 0–2)
BASOPHILS # BLD AUTO: 0.02 10*3/MM3 (ref 0–0.2)
BASOPHILS NFR BLD AUTO: 0.2 % (ref 0–2)
BDY SITE: ABNORMAL
BILIRUB SERPL-MCNC: 0.7 MG/DL (ref 0.1–1)
BILIRUB UR QL STRIP: NEGATIVE
BODY TEMPERATURE: 37 C
BUN BLD-MCNC: 30 MG/DL (ref 5–21)
BUN/CREAT SERPL: 16.5 (ref 7–25)
CALCIUM SPEC-SCNC: 9 MG/DL (ref 8.4–10.4)
CHLORIDE SERPL-SCNC: 100 MMOL/L (ref 98–110)
CLARITY UR: ABNORMAL
CO2 SERPL-SCNC: 23 MMOL/L (ref 24–31)
COLOR UR: ABNORMAL
CREAT BLD-MCNC: 1.82 MG/DL (ref 0.5–1.4)
CRP SERPL-MCNC: 3.68 MG/DL (ref 0–0.99)
D DIMER PPP FEU-MCNC: 1.49 MG/L (FEU) (ref 0–0.5)
D-LACTATE SERPL-SCNC: 1.7 MMOL/L (ref 0.5–2)
DEPRECATED RDW RBC AUTO: 45.7 FL (ref 40–54)
EOSINOPHIL # BLD AUTO: 0.01 10*3/MM3 (ref 0–0.7)
EOSINOPHIL NFR BLD AUTO: 0.1 % (ref 0–4)
ERYTHROCYTE [DISTWIDTH] IN BLOOD BY AUTOMATED COUNT: 14.2 % (ref 12–15)
FLUAV AG NPH QL: NEGATIVE
FLUBV AG NPH QL IA: NEGATIVE
GFR SERPL CREATININE-BSD FRML MDRD: 27 ML/MIN/1.73
GLOBULIN UR ELPH-MCNC: 3.2 GM/DL
GLUCOSE BLD-MCNC: 118 MG/DL (ref 70–100)
GLUCOSE UR STRIP-MCNC: NEGATIVE MG/DL
GRAN CASTS URNS QL MICRO: ABNORMAL /LPF
HCO3 BLDA-SCNC: 20 MMOL/L (ref 20–26)
HCT VFR BLD AUTO: 43.8 % (ref 37–47)
HGB BLD-MCNC: 14.3 G/DL (ref 12–16)
HGB UR QL STRIP.AUTO: ABNORMAL
HOLD SPECIMEN: NORMAL
HYALINE CASTS UR QL AUTO: ABNORMAL /LPF
IMM GRANULOCYTES # BLD: 0.07 10*3/MM3 (ref 0–0.03)
IMM GRANULOCYTES NFR BLD: 0.7 % (ref 0–5)
INR PPP: 0.94 (ref 0.91–1.09)
KETONES UR QL STRIP: ABNORMAL
LEUKOCYTE ESTERASE UR QL STRIP.AUTO: NEGATIVE
LIPASE SERPL-CCNC: 32 U/L (ref 23–203)
LYMPHOCYTES # BLD AUTO: 0.21 10*3/MM3 (ref 0.72–4.86)
LYMPHOCYTES NFR BLD AUTO: 2 % (ref 15–45)
Lab: ABNORMAL
MCH RBC QN AUTO: 28.7 PG (ref 28–32)
MCHC RBC AUTO-ENTMCNC: 32.6 G/DL (ref 33–36)
MCV RBC AUTO: 87.8 FL (ref 82–98)
MODALITY: ABNORMAL
MONOCYTES # BLD AUTO: 0.51 10*3/MM3 (ref 0.19–1.3)
MONOCYTES NFR BLD AUTO: 4.9 % (ref 4–12)
NEUTROPHILS # BLD AUTO: 9.54 10*3/MM3 (ref 1.87–8.4)
NEUTROPHILS NFR BLD AUTO: 92.1 % (ref 39–78)
NITRITE UR QL STRIP: NEGATIVE
NRBC BLD MANUAL-RTO: 0 /100 WBC (ref 0–0)
NT-PROBNP SERPL-MCNC: 849 PG/ML (ref 0–1800)
PCO2 BLDA: 30.9 MM HG (ref 35–45)
PH BLDA: 7.42 PH UNITS (ref 7.35–7.45)
PH UR STRIP.AUTO: <=5 [PH] (ref 5–8)
PLATELET # BLD AUTO: 141 10*3/MM3 (ref 130–400)
PMV BLD AUTO: 10.9 FL (ref 6–12)
PO2 BLDA: 65.5 MM HG (ref 83–108)
POTASSIUM BLD-SCNC: 4.5 MMOL/L (ref 3.5–5.3)
PROT SERPL-MCNC: 7.8 G/DL (ref 6.3–8.7)
PROT UR QL STRIP: ABNORMAL
PROTHROMBIN TIME: 12.8 SECONDS (ref 11.9–14.6)
RBC # BLD AUTO: 4.99 10*6/MM3 (ref 4.2–5.4)
RBC # UR: ABNORMAL /HPF
REF LAB TEST METHOD: ABNORMAL
SAO2 % BLDCOA: 93.8 % (ref 94–99)
SODIUM BLD-SCNC: 142 MMOL/L (ref 135–145)
SP GR UR STRIP: 1.02 (ref 1–1.03)
SQUAMOUS #/AREA URNS HPF: ABNORMAL /HPF
TROPONIN I SERPL-MCNC: 0.03 NG/ML (ref 0–0.03)
UROBILINOGEN UR QL STRIP: ABNORMAL
VENTILATOR MODE: ABNORMAL
WBC NRBC COR # BLD: 10.36 10*3/MM3 (ref 4.8–10.8)
WBC UR QL AUTO: ABNORMAL /HPF

## 2017-12-28 PROCEDURE — 87040 BLOOD CULTURE FOR BACTERIA: CPT | Performed by: NURSE PRACTITIONER

## 2017-12-28 PROCEDURE — 36600 WITHDRAWAL OF ARTERIAL BLOOD: CPT

## 2017-12-28 PROCEDURE — 93005 ELECTROCARDIOGRAM TRACING: CPT | Performed by: NURSE PRACTITIONER

## 2017-12-28 PROCEDURE — 85379 FIBRIN DEGRADATION QUANT: CPT | Performed by: NURSE PRACTITIONER

## 2017-12-28 PROCEDURE — 82150 ASSAY OF AMYLASE: CPT | Performed by: NURSE PRACTITIONER

## 2017-12-28 PROCEDURE — 80053 COMPREHEN METABOLIC PANEL: CPT | Performed by: NURSE PRACTITIONER

## 2017-12-28 PROCEDURE — 87086 URINE CULTURE/COLONY COUNT: CPT | Performed by: NURSE PRACTITIONER

## 2017-12-28 PROCEDURE — 82803 BLOOD GASES ANY COMBINATION: CPT

## 2017-12-28 PROCEDURE — 83036 HEMOGLOBIN GLYCOSYLATED A1C: CPT | Performed by: INTERNAL MEDICINE

## 2017-12-28 PROCEDURE — 83690 ASSAY OF LIPASE: CPT | Performed by: NURSE PRACTITIONER

## 2017-12-28 PROCEDURE — 83880 ASSAY OF NATRIURETIC PEPTIDE: CPT | Performed by: NURSE PRACTITIONER

## 2017-12-28 PROCEDURE — 78582 LUNG VENTILAT&PERFUS IMAGING: CPT

## 2017-12-28 PROCEDURE — 81001 URINALYSIS AUTO W/SCOPE: CPT | Performed by: NURSE PRACTITIONER

## 2017-12-28 PROCEDURE — 84484 ASSAY OF TROPONIN QUANT: CPT | Performed by: NURSE PRACTITIONER

## 2017-12-28 PROCEDURE — 71010 HC CHEST PA OR AP: CPT

## 2017-12-28 PROCEDURE — 85730 THROMBOPLASTIN TIME PARTIAL: CPT | Performed by: NURSE PRACTITIONER

## 2017-12-28 PROCEDURE — 86140 C-REACTIVE PROTEIN: CPT | Performed by: NURSE PRACTITIONER

## 2017-12-28 PROCEDURE — 83605 ASSAY OF LACTIC ACID: CPT | Performed by: NURSE PRACTITIONER

## 2017-12-28 PROCEDURE — 85025 COMPLETE CBC W/AUTO DIFF WBC: CPT | Performed by: NURSE PRACTITIONER

## 2017-12-28 PROCEDURE — A9540 TC99M MAA: HCPCS | Performed by: NURSE PRACTITIONER

## 2017-12-28 PROCEDURE — 85610 PROTHROMBIN TIME: CPT | Performed by: NURSE PRACTITIONER

## 2017-12-28 PROCEDURE — 70450 CT HEAD/BRAIN W/O DYE: CPT

## 2017-12-28 PROCEDURE — 0 TECHNETIUM ALBUMIN AGGREGATED: Performed by: NURSE PRACTITIONER

## 2017-12-28 PROCEDURE — 0 XENON XE 133: Performed by: NURSE PRACTITIONER

## 2017-12-28 PROCEDURE — A9558 XE133 XENON 10MCI: HCPCS | Performed by: NURSE PRACTITIONER

## 2017-12-28 PROCEDURE — 99285 EMERGENCY DEPT VISIT HI MDM: CPT

## 2017-12-28 PROCEDURE — 74176 CT ABD & PELVIS W/O CONTRAST: CPT

## 2017-12-28 PROCEDURE — 87804 INFLUENZA ASSAY W/OPTIC: CPT | Performed by: NURSE PRACTITIONER

## 2017-12-28 PROCEDURE — 93010 ELECTROCARDIOGRAM REPORT: CPT | Performed by: INTERNAL MEDICINE

## 2017-12-28 RX ORDER — SODIUM CHLORIDE 0.9 % (FLUSH) 0.9 %
10 SYRINGE (ML) INJECTION AS NEEDED
Status: DISCONTINUED | OUTPATIENT
Start: 2017-12-28 | End: 2018-01-04 | Stop reason: HOSPADM

## 2017-12-28 RX ORDER — FLUCONAZOLE 150 MG/1
150 TABLET ORAL DAILY
Qty: 3 TABLET | Refills: 0 | Status: SHIPPED | OUTPATIENT
Start: 2017-12-28 | End: 2018-02-26 | Stop reason: CLARIF

## 2017-12-28 RX ADMIN — XENON XE-133 8.5 MILLICURIE: 10 GAS RESPIRATORY (INHALATION) at 20:15

## 2017-12-28 RX ADMIN — Medication 1 DOSE: at 20:18

## 2017-12-28 RX ADMIN — SODIUM CHLORIDE 500 ML: 9 INJECTION, SOLUTION INTRAVENOUS at 18:37

## 2017-12-28 NOTE — TELEPHONE ENCOUNTER
Patient's daughter called stating, \"My mother has had a couple UTI's now and she doesn't seem to be getting better. I think she needs to be seen so she can finally get rid of it. Also she has a yeast infection and I think someone should take a look at that as well. \" Patient is scheduled an appointment with Jennifer Bard Barahona next week. Diflucan sent to pharmacy. Urinalysis ordered.

## 2017-12-29 LAB
ALBUMIN SERPL-MCNC: 4 G/DL (ref 3.5–5)
ALBUMIN/GLOB SERPL: 1.4 G/DL (ref 1.1–2.5)
ALP SERPL-CCNC: 49 U/L (ref 24–120)
ALT SERPL W P-5'-P-CCNC: 37 U/L (ref 0–54)
ANION GAP SERPL CALCULATED.3IONS-SCNC: 14 MMOL/L (ref 4–13)
AST SERPL-CCNC: 35 U/L (ref 7–45)
BASOPHILS # BLD AUTO: 0.01 10*3/MM3 (ref 0–0.2)
BASOPHILS NFR BLD AUTO: 0.1 % (ref 0–2)
BILIRUB SERPL-MCNC: 0.5 MG/DL (ref 0.1–1)
BUN BLD-MCNC: 24 MG/DL (ref 5–21)
BUN/CREAT SERPL: 18 (ref 7–25)
CALCIUM SPEC-SCNC: 8.7 MG/DL (ref 8.4–10.4)
CHLORIDE SERPL-SCNC: 105 MMOL/L (ref 98–110)
CO2 SERPL-SCNC: 22 MMOL/L (ref 24–31)
CREAT BLD-MCNC: 1.33 MG/DL (ref 0.5–1.4)
DEPRECATED RDW RBC AUTO: 45.6 FL (ref 40–54)
EOSINOPHIL # BLD AUTO: 0 10*3/MM3 (ref 0–0.7)
EOSINOPHIL NFR BLD AUTO: 0 % (ref 0–4)
ERYTHROCYTE [DISTWIDTH] IN BLOOD BY AUTOMATED COUNT: 14.2 % (ref 12–15)
GFR SERPL CREATININE-BSD FRML MDRD: 38 ML/MIN/1.73
GLOBULIN UR ELPH-MCNC: 2.8 GM/DL
GLUCOSE BLD-MCNC: 133 MG/DL (ref 70–100)
GLUCOSE BLDC GLUCOMTR-MCNC: 103 MG/DL (ref 70–130)
GLUCOSE BLDC GLUCOMTR-MCNC: 136 MG/DL (ref 70–130)
GLUCOSE BLDC GLUCOMTR-MCNC: 143 MG/DL (ref 70–130)
GLUCOSE BLDC GLUCOMTR-MCNC: 44 MG/DL (ref 70–130)
GLUCOSE BLDC GLUCOMTR-MCNC: 46 MG/DL (ref 70–130)
GLUCOSE BLDC GLUCOMTR-MCNC: 49 MG/DL (ref 70–130)
GLUCOSE BLDC GLUCOMTR-MCNC: 53 MG/DL (ref 70–130)
GLUCOSE BLDC GLUCOMTR-MCNC: 75 MG/DL (ref 70–130)
HBA1C MFR BLD: 5.5 %
HCT VFR BLD AUTO: 39.1 % (ref 37–47)
HGB BLD-MCNC: 12.9 G/DL (ref 12–16)
IMM GRANULOCYTES # BLD: 0.06 10*3/MM3 (ref 0–0.03)
IMM GRANULOCYTES NFR BLD: 0.7 % (ref 0–5)
LYMPHOCYTES # BLD AUTO: 0.18 10*3/MM3 (ref 0.72–4.86)
LYMPHOCYTES NFR BLD AUTO: 2.1 % (ref 15–45)
MAGNESIUM SERPL-MCNC: 2.2 MG/DL (ref 1.4–2.2)
MCH RBC QN AUTO: 29.2 PG (ref 28–32)
MCHC RBC AUTO-ENTMCNC: 33 G/DL (ref 33–36)
MCV RBC AUTO: 88.5 FL (ref 82–98)
MONOCYTES # BLD AUTO: 0.32 10*3/MM3 (ref 0.19–1.3)
MONOCYTES NFR BLD AUTO: 3.8 % (ref 4–12)
NEUTROPHILS # BLD AUTO: 7.87 10*3/MM3 (ref 1.87–8.4)
NEUTROPHILS NFR BLD AUTO: 93.3 % (ref 39–78)
NRBC BLD MANUAL-RTO: 0 /100 WBC (ref 0–0)
PHOSPHATE SERPL-MCNC: 3.3 MG/DL (ref 2.5–4.5)
PLATELET # BLD AUTO: 103 10*3/MM3 (ref 130–400)
PMV BLD AUTO: 11.4 FL (ref 6–12)
POTASSIUM BLD-SCNC: 5 MMOL/L (ref 3.5–5.3)
PROT SERPL-MCNC: 6.8 G/DL (ref 6.3–8.7)
RBC # BLD AUTO: 4.42 10*6/MM3 (ref 4.2–5.4)
SODIUM BLD-SCNC: 141 MMOL/L (ref 135–145)
WBC NRBC COR # BLD: 8.44 10*3/MM3 (ref 4.8–10.8)

## 2017-12-29 PROCEDURE — 25010000002 LORAZEPAM PER 2 MG: Performed by: INTERNAL MEDICINE

## 2017-12-29 PROCEDURE — 84100 ASSAY OF PHOSPHORUS: CPT | Performed by: INTERNAL MEDICINE

## 2017-12-29 PROCEDURE — 82962 GLUCOSE BLOOD TEST: CPT

## 2017-12-29 PROCEDURE — 85025 COMPLETE CBC W/AUTO DIFF WBC: CPT | Performed by: INTERNAL MEDICINE

## 2017-12-29 PROCEDURE — 94799 UNLISTED PULMONARY SVC/PX: CPT

## 2017-12-29 PROCEDURE — 25010000002 CEFTRIAXONE PER 250 MG: Performed by: INTERNAL MEDICINE

## 2017-12-29 PROCEDURE — 80053 COMPREHEN METABOLIC PANEL: CPT | Performed by: INTERNAL MEDICINE

## 2017-12-29 PROCEDURE — 83735 ASSAY OF MAGNESIUM: CPT | Performed by: INTERNAL MEDICINE

## 2017-12-29 PROCEDURE — 25010000002 ENOXAPARIN PER 10 MG: Performed by: INTERNAL MEDICINE

## 2017-12-29 RX ORDER — GABAPENTIN 400 MG/1
400 CAPSULE ORAL NIGHTLY
Status: DISCONTINUED | OUTPATIENT
Start: 2017-12-29 | End: 2017-12-29 | Stop reason: DRUGHIGH

## 2017-12-29 RX ORDER — ACETAMINOPHEN 650 MG/1
650 SUPPOSITORY RECTAL EVERY 4 HOURS PRN
Status: DISCONTINUED | OUTPATIENT
Start: 2017-12-29 | End: 2018-01-04 | Stop reason: HOSPADM

## 2017-12-29 RX ORDER — MELOXICAM 7.5 MG/1
7.5 TABLET ORAL DAILY
COMMUNITY
End: 2018-01-04 | Stop reason: HOSPADM

## 2017-12-29 RX ORDER — POTASSIUM CHLORIDE 1.5 G/1.77G
20 POWDER, FOR SOLUTION ORAL DAILY
Status: DISCONTINUED | OUTPATIENT
Start: 2017-12-29 | End: 2018-01-04 | Stop reason: HOSPADM

## 2017-12-29 RX ORDER — ASPIRIN 81 MG/1
81 TABLET ORAL DAILY
Status: DISCONTINUED | OUTPATIENT
Start: 2017-12-29 | End: 2018-01-04 | Stop reason: HOSPADM

## 2017-12-29 RX ORDER — SODIUM CHLORIDE 450 MG/100ML
50 INJECTION, SOLUTION INTRAVENOUS CONTINUOUS
Status: DISCONTINUED | OUTPATIENT
Start: 2017-12-29 | End: 2017-12-30

## 2017-12-29 RX ORDER — DONEPEZIL HYDROCHLORIDE 10 MG/1
10 TABLET, FILM COATED ORAL NIGHTLY
Status: DISCONTINUED | OUTPATIENT
Start: 2017-12-29 | End: 2018-01-04 | Stop reason: HOSPADM

## 2017-12-29 RX ORDER — MELATONIN
5000 DAILY
Status: DISCONTINUED | OUTPATIENT
Start: 2017-12-29 | End: 2018-01-04 | Stop reason: HOSPADM

## 2017-12-29 RX ORDER — OXYCODONE AND ACETAMINOPHEN 10; 325 MG/1; MG/1
1 TABLET ORAL EVERY 8 HOURS PRN
Status: ON HOLD | COMMUNITY
End: 2018-01-04

## 2017-12-29 RX ORDER — METFORMIN HYDROCHLORIDE 500 MG/1
500 TABLET, EXTENDED RELEASE ORAL EVERY EVENING
COMMUNITY
End: 2018-05-18 | Stop reason: HOSPADM

## 2017-12-29 RX ORDER — FAMOTIDINE 20 MG/1
40 TABLET, FILM COATED ORAL DAILY
Status: DISCONTINUED | OUTPATIENT
Start: 2017-12-29 | End: 2018-01-04 | Stop reason: HOSPADM

## 2017-12-29 RX ORDER — NICOTINE POLACRILEX 4 MG
15 LOZENGE BUCCAL
Status: DISCONTINUED | OUTPATIENT
Start: 2017-12-29 | End: 2018-01-04 | Stop reason: HOSPADM

## 2017-12-29 RX ORDER — OXYCODONE AND ACETAMINOPHEN 10; 325 MG/1; MG/1
1 TABLET ORAL EVERY 8 HOURS PRN
Status: DISCONTINUED | OUTPATIENT
Start: 2017-12-29 | End: 2018-01-04 | Stop reason: HOSPADM

## 2017-12-29 RX ORDER — LORAZEPAM 2 MG/ML
0.5 INJECTION INTRAMUSCULAR EVERY 6 HOURS PRN
Status: DISCONTINUED | OUTPATIENT
Start: 2017-12-29 | End: 2017-12-30

## 2017-12-29 RX ORDER — QUETIAPINE FUMARATE 25 MG/1
25 TABLET, FILM COATED ORAL NIGHTLY
Status: DISCONTINUED | OUTPATIENT
Start: 2017-12-29 | End: 2018-01-01

## 2017-12-29 RX ORDER — POTASSIUM CHLORIDE 1.5 G/1.77G
20 POWDER, FOR SOLUTION ORAL DAILY
COMMUNITY
End: 2018-05-18 | Stop reason: HOSPADM

## 2017-12-29 RX ORDER — GABAPENTIN 300 MG/1
300 CAPSULE ORAL EVERY 12 HOURS SCHEDULED
Status: DISCONTINUED | OUTPATIENT
Start: 2017-12-29 | End: 2018-01-04 | Stop reason: HOSPADM

## 2017-12-29 RX ORDER — SODIUM CHLORIDE 0.9 % (FLUSH) 0.9 %
1-10 SYRINGE (ML) INJECTION AS NEEDED
Status: DISCONTINUED | OUTPATIENT
Start: 2017-12-29 | End: 2018-01-04 | Stop reason: HOSPADM

## 2017-12-29 RX ORDER — GLIPIZIDE 5 MG/1
5 TABLET ORAL DAILY
Status: DISCONTINUED | OUTPATIENT
Start: 2017-12-29 | End: 2018-01-04 | Stop reason: HOSPADM

## 2017-12-29 RX ORDER — FUROSEMIDE 40 MG/1
40 TABLET ORAL DAILY
Status: DISCONTINUED | OUTPATIENT
Start: 2017-12-29 | End: 2017-12-30

## 2017-12-29 RX ORDER — HYDROCODONE BITARTRATE AND ACETAMINOPHEN 5; 325 MG/1; MG/1
1 TABLET ORAL EVERY 4 HOURS PRN
Status: DISCONTINUED | OUTPATIENT
Start: 2017-12-29 | End: 2018-01-04 | Stop reason: HOSPADM

## 2017-12-29 RX ORDER — ACETAMINOPHEN 325 MG/1
650 TABLET ORAL EVERY 4 HOURS PRN
Status: DISCONTINUED | OUTPATIENT
Start: 2017-12-29 | End: 2018-01-04 | Stop reason: HOSPADM

## 2017-12-29 RX ORDER — FLUCONAZOLE 150 MG/1
150 TABLET ORAL EVERY 24 HOURS
Status: COMPLETED | OUTPATIENT
Start: 2017-12-29 | End: 2017-12-30

## 2017-12-29 RX ORDER — ONDANSETRON 2 MG/ML
4 INJECTION INTRAMUSCULAR; INTRAVENOUS EVERY 6 HOURS PRN
Status: DISCONTINUED | OUTPATIENT
Start: 2017-12-29 | End: 2018-01-04 | Stop reason: HOSPADM

## 2017-12-29 RX ORDER — BISACODYL 5 MG/1
5 TABLET, DELAYED RELEASE ORAL DAILY PRN
Status: DISCONTINUED | OUTPATIENT
Start: 2017-12-29 | End: 2018-01-04 | Stop reason: HOSPADM

## 2017-12-29 RX ORDER — DOCUSATE SODIUM 100 MG/1
100 CAPSULE, LIQUID FILLED ORAL 2 TIMES DAILY
Status: DISCONTINUED | OUTPATIENT
Start: 2017-12-29 | End: 2018-01-03

## 2017-12-29 RX ORDER — BISACODYL 10 MG
10 SUPPOSITORY, RECTAL RECTAL EVERY OTHER DAY
Status: DISCONTINUED | OUTPATIENT
Start: 2017-12-30 | End: 2018-01-02

## 2017-12-29 RX ORDER — NYSTATIN 100000 U/G
CREAM TOPICAL EVERY 12 HOURS SCHEDULED
Status: DISCONTINUED | OUTPATIENT
Start: 2017-12-29 | End: 2018-01-04 | Stop reason: HOSPADM

## 2017-12-29 RX ORDER — GABAPENTIN 300 MG/1
300 CAPSULE ORAL
Status: ON HOLD | COMMUNITY
End: 2018-01-04

## 2017-12-29 RX ORDER — ROSUVASTATIN CALCIUM 10 MG/1
10 TABLET, COATED ORAL DAILY
Status: DISCONTINUED | OUTPATIENT
Start: 2017-12-29 | End: 2018-01-04 | Stop reason: HOSPADM

## 2017-12-29 RX ORDER — DEXTROSE MONOHYDRATE 25 G/50ML
25 INJECTION, SOLUTION INTRAVENOUS
Status: DISCONTINUED | OUTPATIENT
Start: 2017-12-29 | End: 2018-01-04 | Stop reason: HOSPADM

## 2017-12-29 RX ORDER — FLUCONAZOLE 150 MG/1
150 TABLET ORAL DAILY
COMMUNITY
End: 2018-01-04 | Stop reason: HOSPADM

## 2017-12-29 RX ORDER — HALOPERIDOL 0.5 MG/1
0.5 TABLET ORAL EVERY 12 HOURS PRN
Status: DISCONTINUED | OUTPATIENT
Start: 2017-12-29 | End: 2017-12-30

## 2017-12-29 RX ORDER — HYDROXYZINE PAMOATE 50 MG/1
50 CAPSULE ORAL NIGHTLY PRN
Status: DISCONTINUED | OUTPATIENT
Start: 2017-12-29 | End: 2018-01-04 | Stop reason: HOSPADM

## 2017-12-29 RX ADMIN — ENOXAPARIN SODIUM 30 MG: 30 INJECTION SUBCUTANEOUS at 08:52

## 2017-12-29 RX ADMIN — FUROSEMIDE 40 MG: 40 TABLET ORAL at 08:51

## 2017-12-29 RX ADMIN — POTASSIUM CHLORIDE 20 MEQ: 1.5 POWDER, FOR SOLUTION ORAL at 08:51

## 2017-12-29 RX ADMIN — GLIPIZIDE 5 MG: 5 TABLET ORAL at 08:52

## 2017-12-29 RX ADMIN — HYDROCODONE BITARTRATE AND ACETAMINOPHEN 1 TABLET: 5; 325 TABLET ORAL at 08:52

## 2017-12-29 RX ADMIN — Medication 81 MG: at 08:52

## 2017-12-29 RX ADMIN — SODIUM CHLORIDE 100 ML/HR: 4.5 INJECTION, SOLUTION INTRAVENOUS at 15:08

## 2017-12-29 RX ADMIN — CHOLECALCIFEROL (VITAMIN D3) 25 MCG (1,000 UNIT) TABLET 5000 UNITS: TABLET at 08:52

## 2017-12-29 RX ADMIN — LORAZEPAM 0.5 MG: 2 INJECTION INTRAMUSCULAR at 15:04

## 2017-12-29 RX ADMIN — SODIUM CHLORIDE 125 ML/HR: 4.5 INJECTION, SOLUTION INTRAVENOUS at 05:23

## 2017-12-29 RX ADMIN — FAMOTIDINE 40 MG: 20 TABLET ORAL at 08:52

## 2017-12-29 RX ADMIN — Medication 10 ML: at 05:19

## 2017-12-29 RX ADMIN — HALOPERIDOL 0.5 MG: 0.5 TABLET ORAL at 18:37

## 2017-12-29 RX ADMIN — ROSUVASTATIN CALCIUM 10 MG: 10 TABLET, FILM COATED ORAL at 08:51

## 2017-12-29 RX ADMIN — FLUCONAZOLE 150 MG: 150 TABLET ORAL at 15:59

## 2017-12-29 RX ADMIN — CEFTRIAXONE SODIUM 1 G: 1 INJECTION, POWDER, FOR SOLUTION INTRAMUSCULAR; INTRAVENOUS at 05:26

## 2017-12-30 ENCOUNTER — APPOINTMENT (OUTPATIENT)
Dept: GENERAL RADIOLOGY | Facility: HOSPITAL | Age: 82
End: 2017-12-30

## 2017-12-30 LAB
ANION GAP SERPL CALCULATED.3IONS-SCNC: 11 MMOL/L (ref 4–13)
BACTERIA SPEC AEROBE CULT: NORMAL
BUN BLD-MCNC: 16 MG/DL (ref 5–21)
BUN/CREAT SERPL: 16.3 (ref 7–25)
CALCIUM SPEC-SCNC: 8.5 MG/DL (ref 8.4–10.4)
CHLORIDE SERPL-SCNC: 102 MMOL/L (ref 98–110)
CO2 SERPL-SCNC: 22 MMOL/L (ref 24–31)
CREAT BLD-MCNC: 0.98 MG/DL (ref 0.5–1.4)
DEPRECATED RDW RBC AUTO: 43.7 FL (ref 40–54)
ERYTHROCYTE [DISTWIDTH] IN BLOOD BY AUTOMATED COUNT: 13.9 % (ref 12–15)
GFR SERPL CREATININE-BSD FRML MDRD: 55 ML/MIN/1.73
GLUCOSE BLD-MCNC: 100 MG/DL (ref 70–100)
GLUCOSE BLDC GLUCOMTR-MCNC: 104 MG/DL (ref 70–130)
GLUCOSE BLDC GLUCOMTR-MCNC: 105 MG/DL (ref 70–130)
GLUCOSE BLDC GLUCOMTR-MCNC: 132 MG/DL (ref 70–130)
GLUCOSE BLDC GLUCOMTR-MCNC: 98 MG/DL (ref 70–130)
HCT VFR BLD AUTO: 35.2 % (ref 37–47)
HGB BLD-MCNC: 12.2 G/DL (ref 12–16)
MCH RBC QN AUTO: 29.6 PG (ref 28–32)
MCHC RBC AUTO-ENTMCNC: 34.7 G/DL (ref 33–36)
MCV RBC AUTO: 85.4 FL (ref 82–98)
PLATELET # BLD AUTO: 89 10*3/MM3 (ref 130–400)
PMV BLD AUTO: 11.4 FL (ref 6–12)
POTASSIUM BLD-SCNC: 3.6 MMOL/L (ref 3.5–5.3)
RBC # BLD AUTO: 4.12 10*6/MM3 (ref 4.2–5.4)
SODIUM BLD-SCNC: 135 MMOL/L (ref 135–145)
WBC NRBC COR # BLD: 6.49 10*3/MM3 (ref 4.8–10.8)

## 2017-12-30 PROCEDURE — 71010 HC CHEST PA OR AP: CPT

## 2017-12-30 PROCEDURE — 25010000002 CEFTRIAXONE PER 250 MG: Performed by: INTERNAL MEDICINE

## 2017-12-30 PROCEDURE — 82962 GLUCOSE BLOOD TEST: CPT

## 2017-12-30 PROCEDURE — 85027 COMPLETE CBC AUTOMATED: CPT | Performed by: NURSE PRACTITIONER

## 2017-12-30 PROCEDURE — 80048 BASIC METABOLIC PNL TOTAL CA: CPT | Performed by: NURSE PRACTITIONER

## 2017-12-30 RX ORDER — CEFDINIR 300 MG/1
300 CAPSULE ORAL EVERY 12 HOURS SCHEDULED
Status: COMPLETED | OUTPATIENT
Start: 2017-12-30 | End: 2018-01-02

## 2017-12-30 RX ORDER — HALOPERIDOL 0.5 MG/1
0.25 TABLET ORAL EVERY 12 HOURS PRN
Status: DISCONTINUED | OUTPATIENT
Start: 2017-12-30 | End: 2018-01-04 | Stop reason: HOSPADM

## 2017-12-30 RX ADMIN — GLIPIZIDE 5 MG: 5 TABLET ORAL at 10:16

## 2017-12-30 RX ADMIN — DOCUSATE SODIUM 100 MG: 100 CAPSULE ORAL at 19:54

## 2017-12-30 RX ADMIN — GABAPENTIN 300 MG: 300 CAPSULE ORAL at 10:15

## 2017-12-30 RX ADMIN — CEFDINIR 300 MG: 300 CAPSULE ORAL at 19:54

## 2017-12-30 RX ADMIN — CEFTRIAXONE SODIUM 1 G: 1 INJECTION, POWDER, FOR SOLUTION INTRAMUSCULAR; INTRAVENOUS at 04:03

## 2017-12-30 RX ADMIN — FLUCONAZOLE 150 MG: 150 TABLET ORAL at 16:48

## 2017-12-30 RX ADMIN — BISACODYL 10 MG: 10 SUPPOSITORY RECTAL at 10:15

## 2017-12-30 RX ADMIN — SODIUM CHLORIDE 100 ML/HR: 4.5 INJECTION, SOLUTION INTRAVENOUS at 02:12

## 2017-12-30 RX ADMIN — HYDROCODONE BITARTRATE AND ACETAMINOPHEN 1 TABLET: 5; 325 TABLET ORAL at 19:54

## 2017-12-30 RX ADMIN — DONEPEZIL HYDROCHLORIDE 10 MG: 10 TABLET, FILM COATED ORAL at 19:54

## 2017-12-30 RX ADMIN — DOCUSATE SODIUM 100 MG: 100 CAPSULE ORAL at 10:16

## 2017-12-30 RX ADMIN — POTASSIUM CHLORIDE 20 MEQ: 1.5 POWDER, FOR SOLUTION ORAL at 10:17

## 2017-12-30 RX ADMIN — GABAPENTIN 300 MG: 300 CAPSULE ORAL at 19:54

## 2017-12-30 RX ADMIN — FAMOTIDINE 40 MG: 20 TABLET ORAL at 10:14

## 2017-12-30 RX ADMIN — CHOLECALCIFEROL (VITAMIN D3) 25 MCG (1,000 UNIT) TABLET 5000 UNITS: TABLET at 10:13

## 2017-12-30 RX ADMIN — NYSTATIN: 100000 CREAM TOPICAL at 10:16

## 2017-12-30 RX ADMIN — ROSUVASTATIN CALCIUM 10 MG: 10 TABLET, FILM COATED ORAL at 10:17

## 2017-12-30 RX ADMIN — Medication 81 MG: at 10:15

## 2017-12-30 RX ADMIN — QUETIAPINE FUMARATE 25 MG: 25 TABLET, FILM COATED ORAL at 19:54

## 2017-12-30 RX ADMIN — NYSTATIN: 100000 CREAM TOPICAL at 20:10

## 2017-12-30 RX ADMIN — FUROSEMIDE 40 MG: 40 TABLET ORAL at 10:16

## 2017-12-31 LAB
ANION GAP SERPL CALCULATED.3IONS-SCNC: 14 MMOL/L (ref 4–13)
BUN BLD-MCNC: 14 MG/DL (ref 5–21)
BUN/CREAT SERPL: 15.6 (ref 7–25)
CALCIUM SPEC-SCNC: 9.2 MG/DL (ref 8.4–10.4)
CHLORIDE SERPL-SCNC: 106 MMOL/L (ref 98–110)
CO2 SERPL-SCNC: 24 MMOL/L (ref 24–31)
CREAT BLD-MCNC: 0.9 MG/DL (ref 0.5–1.4)
DEPRECATED RDW RBC AUTO: 44.2 FL (ref 40–54)
ERYTHROCYTE [DISTWIDTH] IN BLOOD BY AUTOMATED COUNT: 14.2 % (ref 12–15)
GFR SERPL CREATININE-BSD FRML MDRD: 60 ML/MIN/1.73
GLUCOSE BLD-MCNC: 106 MG/DL (ref 70–100)
GLUCOSE BLDC GLUCOMTR-MCNC: 108 MG/DL (ref 70–130)
GLUCOSE BLDC GLUCOMTR-MCNC: 131 MG/DL (ref 70–130)
GLUCOSE BLDC GLUCOMTR-MCNC: 168 MG/DL (ref 70–130)
GLUCOSE BLDC GLUCOMTR-MCNC: 178 MG/DL (ref 70–130)
GLUCOSE BLDC GLUCOMTR-MCNC: 75 MG/DL (ref 70–130)
HCT VFR BLD AUTO: 41.5 % (ref 37–47)
HGB BLD-MCNC: 14 G/DL (ref 12–16)
MCH RBC QN AUTO: 28.9 PG (ref 28–32)
MCHC RBC AUTO-ENTMCNC: 33.7 G/DL (ref 33–36)
MCV RBC AUTO: 85.7 FL (ref 82–98)
PLATELET # BLD AUTO: 101 10*3/MM3 (ref 130–400)
PMV BLD AUTO: 11.6 FL (ref 6–12)
POTASSIUM BLD-SCNC: 3.9 MMOL/L (ref 3.5–5.3)
RBC # BLD AUTO: 4.84 10*6/MM3 (ref 4.2–5.4)
SODIUM BLD-SCNC: 144 MMOL/L (ref 135–145)
WBC NRBC COR # BLD: 4.46 10*3/MM3 (ref 4.8–10.8)

## 2017-12-31 PROCEDURE — G8987 SELF CARE CURRENT STATUS: HCPCS | Performed by: OCCUPATIONAL THERAPIST

## 2017-12-31 PROCEDURE — 97166 OT EVAL MOD COMPLEX 45 MIN: CPT | Performed by: OCCUPATIONAL THERAPIST

## 2017-12-31 PROCEDURE — 63710000001 INSULIN LISPRO (HUMAN) PER 5 UNITS: Performed by: INTERNAL MEDICINE

## 2017-12-31 PROCEDURE — 80048 BASIC METABOLIC PNL TOTAL CA: CPT | Performed by: NURSE PRACTITIONER

## 2017-12-31 PROCEDURE — 82962 GLUCOSE BLOOD TEST: CPT

## 2017-12-31 PROCEDURE — G8988 SELF CARE GOAL STATUS: HCPCS | Performed by: OCCUPATIONAL THERAPIST

## 2017-12-31 PROCEDURE — 85027 COMPLETE CBC AUTOMATED: CPT | Performed by: NURSE PRACTITIONER

## 2017-12-31 RX ADMIN — FAMOTIDINE 40 MG: 20 TABLET ORAL at 08:45

## 2017-12-31 RX ADMIN — NYSTATIN: 100000 CREAM TOPICAL at 08:45

## 2017-12-31 RX ADMIN — GLIPIZIDE 5 MG: 5 TABLET ORAL at 08:45

## 2017-12-31 RX ADMIN — HYDROCODONE BITARTRATE AND ACETAMINOPHEN 1 TABLET: 5; 325 TABLET ORAL at 09:20

## 2017-12-31 RX ADMIN — DOCUSATE SODIUM 100 MG: 100 CAPSULE ORAL at 08:45

## 2017-12-31 RX ADMIN — CHOLECALCIFEROL (VITAMIN D3) 25 MCG (1,000 UNIT) TABLET 5000 UNITS: TABLET at 08:45

## 2017-12-31 RX ADMIN — NYSTATIN: 100000 CREAM TOPICAL at 20:32

## 2017-12-31 RX ADMIN — Medication 81 MG: at 08:45

## 2017-12-31 RX ADMIN — DONEPEZIL HYDROCHLORIDE 10 MG: 10 TABLET, FILM COATED ORAL at 20:31

## 2017-12-31 RX ADMIN — CEFDINIR 300 MG: 300 CAPSULE ORAL at 08:45

## 2017-12-31 RX ADMIN — QUETIAPINE FUMARATE 25 MG: 25 TABLET, FILM COATED ORAL at 20:31

## 2017-12-31 RX ADMIN — GABAPENTIN 300 MG: 300 CAPSULE ORAL at 08:45

## 2017-12-31 RX ADMIN — CEFDINIR 300 MG: 300 CAPSULE ORAL at 20:31

## 2017-12-31 RX ADMIN — ROSUVASTATIN CALCIUM 10 MG: 10 TABLET, FILM COATED ORAL at 08:45

## 2017-12-31 RX ADMIN — GABAPENTIN 300 MG: 300 CAPSULE ORAL at 20:31

## 2017-12-31 RX ADMIN — INSULIN LISPRO 2 UNITS: 100 INJECTION, SOLUTION INTRAVENOUS; SUBCUTANEOUS at 20:31

## 2017-12-31 RX ADMIN — HYDROCODONE BITARTRATE AND ACETAMINOPHEN 1 TABLET: 5; 325 TABLET ORAL at 20:31

## 2018-01-01 LAB
GLUCOSE BLDC GLUCOMTR-MCNC: 108 MG/DL (ref 70–130)
GLUCOSE BLDC GLUCOMTR-MCNC: 115 MG/DL (ref 70–130)
GLUCOSE BLDC GLUCOMTR-MCNC: 60 MG/DL (ref 70–130)
GLUCOSE BLDC GLUCOMTR-MCNC: 78 MG/DL (ref 70–130)

## 2018-01-01 PROCEDURE — 97163 PT EVAL HIGH COMPLEX 45 MIN: CPT

## 2018-01-01 PROCEDURE — 82962 GLUCOSE BLOOD TEST: CPT

## 2018-01-01 PROCEDURE — G8978 MOBILITY CURRENT STATUS: HCPCS

## 2018-01-01 PROCEDURE — G8979 MOBILITY GOAL STATUS: HCPCS

## 2018-01-01 RX ADMIN — DONEPEZIL HYDROCHLORIDE 10 MG: 10 TABLET, FILM COATED ORAL at 20:24

## 2018-01-01 RX ADMIN — ROSUVASTATIN CALCIUM 10 MG: 10 TABLET, FILM COATED ORAL at 11:06

## 2018-01-01 RX ADMIN — FAMOTIDINE 40 MG: 20 TABLET ORAL at 10:56

## 2018-01-01 RX ADMIN — GLIPIZIDE 5 MG: 5 TABLET ORAL at 10:56

## 2018-01-01 RX ADMIN — GABAPENTIN 300 MG: 300 CAPSULE ORAL at 10:56

## 2018-01-01 RX ADMIN — HYDROCODONE BITARTRATE AND ACETAMINOPHEN 1 TABLET: 5; 325 TABLET ORAL at 11:05

## 2018-01-01 RX ADMIN — CHOLECALCIFEROL (VITAMIN D3) 25 MCG (1,000 UNIT) TABLET 5000 UNITS: TABLET at 10:56

## 2018-01-01 RX ADMIN — NYSTATIN: 100000 CREAM TOPICAL at 20:24

## 2018-01-01 RX ADMIN — GABAPENTIN 300 MG: 300 CAPSULE ORAL at 20:24

## 2018-01-01 RX ADMIN — DOCUSATE SODIUM 100 MG: 100 CAPSULE ORAL at 10:56

## 2018-01-01 RX ADMIN — CEFDINIR 300 MG: 300 CAPSULE ORAL at 20:24

## 2018-01-01 RX ADMIN — CEFDINIR 300 MG: 300 CAPSULE ORAL at 10:57

## 2018-01-01 RX ADMIN — POTASSIUM CHLORIDE 20 MEQ: 1.5 POWDER, FOR SOLUTION ORAL at 10:56

## 2018-01-01 RX ADMIN — Medication 81 MG: at 10:57

## 2018-01-01 RX ADMIN — NYSTATIN: 100000 CREAM TOPICAL at 10:58

## 2018-01-01 NOTE — PLAN OF CARE
Problem: Patient Care Overview (Adult)  Goal: Plan of Care Review  Outcome: Ongoing (interventions implemented as appropriate)   01/01/18 0956   Coping/Psychosocial Response Interventions   Plan Of Care Reviewed With patient;daughter   Patient Care Overview   Progress progress toward functional goals as expected   Outcome Evaluation   Outcome Summary/Follow up Plan PT evaluation performed. Pt presents to us with increased generalized weakness RLE appearing to be more limited but this could be due to chronic back and right leg pain. She demonstrates poor tolerance to activity and decreased balance thus creating a further impairment regarding her gait. She performed bed mobility with Yue, sit<>stand with ModA due a posterior lean noted today and standing balance with rollator was poor to fair. She was able to ambulation post increased encouragement from therapist w/ her rollator and ModA for 10ft. She tended to learch over her RLE and this created a retropulsive type force causing a posterior lean with each step (requiring ModA from therapist). After 10ft to the doorway, she gestured to RLE pain and then reported the had to use the restroom at which point Nely gregorio RN was seen and asked to assist therapist. Pt ambulated back to bed 10ft and MinAx2 and performed stand to sit with ModAx2, sit>supine w/ MaxAx2. Pt did not end up needing to use the restroom. She does require skilled therapy in order to improve her functional levels for return to her [facility] but due to her decreased cognitive status and physical deficits I believe she may benefit from a SNF post d/c acute prior to returning to the [facility] but we will continue to reassess as we work with her. Thank you for this referral.        Problem: Inpatient Physical Therapy  Goal: Bed Mobility Goal LTG- PT  Outcome: Ongoing (interventions implemented as appropriate)   01/01/18 0956   Bed Mobility PT LTG   Bed Mobility PT LTG, Date Established 01/01/18   Bed Mobility PT  LTG, Activity Type all bed mobility   Bed Mobility PT LTG, [place] Level supervision required   Bed Mobility PT LTG, Additional Goal she has a hospital bed at home     Goal: Transfer Training Goal 1 LTG- PT  Outcome: Ongoing (interventions implemented as appropriate)   01/01/18 0956   Transfer Training PT LTG   Transfer Training PT LTG, Date Established 01/01/18   Transfer Training PT LTG, Time to Achieve by discharge   Transfer Training PT LTG, Activity Type bed to chair /chair to bed   Transfer Training PT LTG, [place] Level minimum assist (75% patient effort)   Transfer Training PT LTG, Assist Device walker, rolling  (or rollator)     Goal: Transfer Training Goal 2 LTG- PT  Outcome: Ongoing (interventions implemented as appropriate)   01/01/18 0956   Transfer Training 2 PT LTG   Transfer Training PT 2 LTG, Date Established 01/01/18   Transfer Training PT 2 LTG, Time to Achieve by discharge   Transfer Training PT 2 LTG, Activity Type sit to stand/stand to sit   Transfer Training PT 2 LTG, [place] Level contact guard assist   Transfer Training PT 2 LTG, Assist Device walker, rolling  (or rollator)     Goal: Gait Training Goal LTG- PT  Outcome: Ongoing (interventions implemented as appropriate)   01/01/18 0956   Gait Training PT LTG   Gait Training Goal PT LTG, Date Established 01/01/18   Gait Training Goal PT LTG, Time to Achieve by discharge   Gait Training Goal PT LTG, [place] Level minimum assist (75% patient effort)   Gait Training Goal PT LTG, Assist Device walker, rolling  (or rollator)   Gait Training Goal PT LTG, Distance to Achieve 30ft, with improved step length on the R     Goal: Strength Goal LTG- PT  Outcome: Ongoing (interventions implemented as appropriate)   01/01/18 0956   Strength Goal PT LTG   Strength Goal PT LTG, Date Established 01/01/18   Strength Goal PT LTG, Time to Achieve by discharge   Strength Goal PT LTG, Measure to Achieve 2x10 LE strengthening     Goal: Dynamic  Sitting Balance Goal LTG- PT   01/01/18 0956   Dynamic Sitting Balance PT LTG   Dynamic Sitting Balance PT LTG, Date Established 01/01/18   Dynamic Sitting Balance PT LTG, Time to Achieve by discharge   Dynamic Sitting Balance PT LTG, [place] Level verbal cues required;contact guard assist     Goal: Static Standing Balance Goal LTG- PT   01/01/18 0956   Static Standing Balance PT LTG   Static Standing Balance PT LTG, Date Established 01/01/18   Static Standing Balance PT LTG, Time to Achieve by discharge   Static Standing Balance PT LTG, [place] Level contact guard assist;minimum assist (75% patient effort)   Static Standing Balance PT LTG, Assist Device UE Support

## 2018-01-01 NOTE — PLAN OF CARE
Problem: Patient Care Overview (Adult)  Goal: Plan of Care Review  Outcome: Ongoing (interventions implemented as appropriate)   01/01/18 0220   Coping/Psychosocial Response Interventions   Plan Of Care Reviewed With patient   Patient Care Overview   Progress improving   Outcome Evaluation   Outcome Summary/Follow up Plan VSS BP slightly low. Prince protocol with turns every 2 hours. Fall protocol with bed alarm set. Pt cooperative and awake and able to answer simple questions. PRN pain med given for generalized pain at bedtime. PT OT eval and treat ordered. Possible discharge back to assited living or nursing home in next day or so?     Goal: Adult Individualization and Mutuality  Outcome: Ongoing (interventions implemented as appropriate)    Goal: Discharge Needs Assessment  Outcome: Ongoing (interventions implemented as appropriate)      Problem: Renal Failure/Kidney Injury, Acute (Adult)  Goal: Signs and Symptoms of Listed Potential Problems Will be Absent or Manageable (Renal Failure/Kidney Injury, Acute)  Outcome: Ongoing (interventions implemented as appropriate)      Problem: Fall Risk (Adult)  Goal: Identify Related Risk Factors and Signs and Symptoms  Outcome: Ongoing (interventions implemented as appropriate)    Goal: Absence of Falls  Outcome: Ongoing (interventions implemented as appropriate)      Problem: Nutrition, Imbalanced: Inadequate Oral Intake (Adult)  Goal: Identify Related Risk Factors and Signs and Symptoms  Outcome: Ongoing (interventions implemented as appropriate)    Goal: Improved Oral Intake  Outcome: Ongoing (interventions implemented as appropriate)    Goal: Prevent Further Weight Loss  Outcome: Ongoing (interventions implemented as appropriate)      Problem: Pressure Ulcer Risk (Prince Scale) (Adult,Obstetrics,Pediatric)  Goal: Identify Related Risk Factors and Signs and Symptoms  Outcome: Ongoing (interventions implemented as appropriate)    Goal: Skin Integrity  Outcome: Ongoing  (interventions implemented as appropriate)      Problem: Skin Integrity Impairment, Risk/Actual (Adult)  Goal: Identify Related Risk Factors and Signs and Symptoms  Outcome: Ongoing (interventions implemented as appropriate)    Goal: Skin Integrity/Wound Healing  Outcome: Ongoing (interventions implemented as appropriate)      Problem: Infection, Risk/Actual (Adult)  Goal: Identify Related Risk Factors and Signs and Symptoms  Outcome: Ongoing (interventions implemented as appropriate)    Goal: Infection Prevention/Resolution  Outcome: Ongoing (interventions implemented as appropriate)      Problem: Cardiac: Heart Failure (Adult)  Goal: Signs and Symptoms of Listed Potential Problems Will be Absent or Manageable (Cardiac: Heart Failure)  Outcome: Ongoing (interventions implemented as appropriate)

## 2018-01-01 NOTE — THERAPY EVALUATION
Acute Care - Physical Therapy Initial Evaluation  [facility]     Patient Name: Marina Hyman  : 1935  MRN: [id number]  Today's Date: 2018   Onset of Illness/Injury or Date of Surgery Date: 17  Date of Referral to PT: 17  Referring Physician: Dr Padgett      Admit Date: 2017     Visit Dx:    ICD-10-CM ICD-9-CM   1. ELIECER (acute kidney injury) N17.9 584.9   2. Dehydration E86.0 276.51   3. Hypoxemia R09.02 799.02   4. Altered mental status, unspecified altered mental status type R41.82 780.97   5. Weakness R53.1 780.79   6. Impaired mobility and ADLs Z74.09 799.89   7. Impaired functional mobility, balance, gait, and endurance Z74.09 V49.89     Patient Active Problem List   Diagnosis   • ELIECER (acute kidney injury)     Past Medical History:   Diagnosis Date   • CHF (congestive heart failure)    • Diabetes mellitus      Past Surgical History:   Procedure Laterality Date   • AORTIC VALVE REPAIR/REPLACEMENT            PT ASSESSMENT (last 72 hours)      PT Evaluation       18 0836 17 0730    Rehab Evaluation    Document Type evaluation  -TC evaluation   See MAR  -TR    Subjective Information agree to therapy;complains of  -TC agree to therapy;complains of;fatigue  -TR    Patient Effort, Rehab Treatment  adequate  -TR    Symptoms Noted During/After Treatment  other (see comments)  -TR    Symptoms Noted Comment Increased cues required for participation and to follow cues from both PT and daughter.   -TC Increased cues required for participation from OT and daughter.   -TR    General Information    Patient Profile Review yes  -TC yes  -TR    Onset of Illness/Injury or Date of Surgery Date 17  -TC 17  -TR    Referring Physician Dr Padgett  -TC Yamileth Allen, APRN  -TR    General Observations  Fowlers, sleeping, telemetry, SCDs, daughter present and gave consent for OT to eval.   -TR    Pertinent History Of [facility] Problem [facility] increased  "confusion, LLQ and back pain, weakness and difficulty ambulating. CXR, CT head, CT abd/pelvis, lung ventilation perfusion negative for acute findings. Dx: ELIECER, dehydration, hypoxemia, AMS, weakness, UTI, acute metabolic encephalopathy, HTN, CAD, hypothyroidism, thrombocytopenia, elevated D-dimer.   -TC Pt admitted from longterm following increased confusion, LLQ and back pain, weakness and difficulty ambulating. CXR, CT head, CT abd/pelvis, lung ventilation perfusion negative for acute findings. Dx: ELIECER, dehydration, hypoxemia, AMS, weakness, UTI, acute metabolic encephalopathy, HTN, CAD, hypothyroidism, thrombocytopenia, elevated D-dimer.   -TR    Precautions/Limitations fall precautions   Alzheimers;Goes by \"Lidia\"responds well to males&compliments  -TC fall precautions  -TR    Prior Level of Function independent:;all household mobility;gait;transfer;dressing;grooming;feeding;mod assist:;bathing;max assist:;home management;cooking;cleaning;dependent:;driving  -TC independent:;all household mobility;gait;transfer;dressing;grooming;feeding;mod assist:;bathing;max assist:;home management;cooking;cleaning;dependent:;driving  -TR    Equipment Currently Used at Home rollator;commode;shower chair;hospital bed  -TC rollator;commode;shower chair;hospital bed  -TR    Plans/Goals Discussed With agreed upon;patient and family  -TC patient and family;agreed upon  -TR    Risks Reviewed patient and family:;LOB;nausea/vomiting;dizziness;increased discomfort  -TC patient and family:;LOB;dizziness;increased discomfort;change in vital signs  -TR    Benefits Reviewed patient and family:;improve function;increase independence;increase strength;increase balance;decrease pain  -TC patient and family:;improve function;increase independence;increase strength;increase balance;decrease pain;increase knowledge  -TR    Barriers to Rehab cognitive status;previous functional deficit   chronic back and Rt leg pain;  -TC cognitive status;previous " functional deficit  -TR    Living Environment    Lives With facility resident  -TC facility resident  -TR    Living Arrangements assisted living   the [facility]  - assisted living   [place]  -    Home Accessibility no concerns  -TC no concerns  -TR    Clinical Impression    Date of Referral to PT 12/29/17  -TC     Pain Assessment    Pain Assessment Emily BEST  -TC No/denies pain  -TR    [place] FACES Pain Rating 6   went from 2 on villas[place] baker to 6 w/ ambulation/movement  -TC     Pain Type Chronic pain  -TC     Pain Location Back   and right leg  -TC     Pain Orientation Right  -TC     Pain Intervention(s) Repositioned  -TC     Response to Interventions tolerated; nursing asked if pt could recieve pain medication  -TC     Vision Assessment/Intervention    Visual Impairment WFL with corrective lenses  -TC WFL with corrective lenses  -TR    Cognitive Assessment/Intervention    Current Cognitive/Communication Assessment impaired  -TC impaired  -TR    Orientation Status oriented to;person;disoriented to;place;time;situation  -TC oriented to;person;disoriented to;place;time;situation  -TR    Follows Commands/Answers Questions able to follow single-step instructions;50% of the time   required max motivation and encouragement  -TC able to follow single-step instructions;50% of the time;needs cueing;needs repetition  -TR    Personal Safety decreased awareness, need for assist;decreased awareness, need for safety;decreased insight to deficits  -TC decreased awareness, need for assist;decreased awareness, need for safety  -TR    Personal Safety Interventions fall prevention program maintained;gait belt;muscle strengthening facilitated;nonskid shoes/slippers when out of bed   tennis shoes  -TC gait belt;fall prevention program maintained;nonskid shoes/slippers when out of bed;supervised activity  -TR    ROM (Range of Motion)    General ROM Detail unable to assess LE AROM due to her decreased ability to follow  commands. Functionally WFLs, had the available ROM to be able to lean over and don/doff shoes today with Yue and Dep for tying this morning;   -TC Unable to accurately assess due to difficulty following commands. B UE AROM Grossly WFL.  -TR    MMT (Manual Muscle Testing)    General MMT Assessment Detail unable to truly assess due to dec ability to follow commands; functionally LLE 4-/5 and RLE 3+ to 4-/5 (tended to drag RLE with grimmacing noted and therefore I believe this is moreso due to her chronic pain vs strength)   -TC Functionally 4-/5  -TR    Muscle Tone Assessment    Muscle Tone Assessment  --   WFL.   -TR    Bed Mobility, Assessment/Treatment    Bed Mobility, Assistive Device bed rails;head of bed elevated  -TC bed rails;head of bed elevated;draw sheet  -TR    Bed Mobility, Scoot/Bridge, [place] 2 person assist required;maximum assist (25% patient effort)   up in bed   -TC     Bed Mob, Supine to Sit, [place] verbal cues required;nonverbal cues required (demo/gesture);minimum assist (75% patient effort)   increased time to perform but able to do so with Yue   -TC moderate assist (50% patient effort);verbal cues required  -TR    Bed Mob, Sit to Supine, [place] verbal cues required;nonverbal cues required (demo/gesture);maximum assist (25% patient effort);2 person assist required  -TC     Bed Mobility, Safety Issues cognitive deficits limit understanding;decreased use of arms for pushing/pulling;decreased use of legs for bridging/pushing  -TC decreased use of arms for pushing/pulling;decreased use of legs for bridging/pushing  -TR    Bed Mobility, Impairments strength decreased;impaired balance;motor control impaired;pain   cognition  -TC strength decreased  -TR    Transfer Assessment/Treatment    Transfers, Sit-Stand [place] verbal cues required;nonverbal cues required (demo/gesture);minimum assist (75% patient effort);moderate assist (50% patient effort)   posterior lean today  -TC  "minimum assist (75% patient effort);moderate assist (50% patient effort);verbal cues required  -TR    Transfers, Stand-Sit [place] verbal cues required;nonverbal cues required (demo/gesture);moderate assist (50% patient effort)   post walking   -TC minimum assist (75% patient effort);moderate assist (50% patient effort);verbal cues required  -TR    Transfers, Sit-Stand-Sit, Assist Device  --   Rollator  -TR    Toilet Transfer, [place]  moderate assist (50% patient effort);verbal cues required  -TR    Toilet Transfer, Assistive Device  --   Rollator, grab bar  -TR    Transfer, Impairments strength decreased;impaired balance;motor control impaired;pain   cognition  -TC impaired balance;strength decreased  -TR    Gait Assessment/Treatment    Gait, [place] Level verbal cues required;nonverbal cues required (demo/gesture);minimum assist (75% patient effort);moderate assist (50% patient effort);2 person assist required  -TC     Gait, Assistive Device rollator  -TC     Gait, Distance (Feet) 10   10x2  -TC     Gait, Comment Pt ambulated 10ft from bed to past the doorway ModA w/ rollator due to her posterior lean. Therapist noticed she tends to favor the left side and learch over the right creating an increased post lean at this time. She then began to lean over her rollator and when asked reported she was \"in pain\" and grabbed her R hip/leg. We turned and began to walk back towards the bed when she then reported that she \"had to use the restroom\" Nely XIE was asked for assistance for patients safe return to the bed for 10ft and MinAx2. She required max cues for safety throughout this and did not end up needing to use the restroom. She was assisted back into bed w/ Max x2 assist.  -TC     Stairs Assessment/Treatment    Stairs, Comment deferred for safety and pt will not have at [facility]   -     Motor Skills/Interventions    Additional Documentation Balance Skills Training (Group)  - Balance Skills Training " (Group)  -TR    Balance Skills Training    Sitting-Level of Assistance Contact guard;Minimum assistance   Yue initially and w/ donning shoes; otherwise CGA for sway  -TC Contact guard;Minimum assistance  -TR    Sitting-Balance Support Right upper extremity supported;Left upper extremity supported;Feet supported  -TC Feet supported;Right upper extremity supported;Left upper extremity supported  -TR    Standing-Level of Assistance Moderate assistance;Minimum assistance  -TC Contact guard;Minimum assistance  -TR    Static Standing Balance Support Right upper extremity supported;Left upper extremity supported;assistive device  -TC Right upper extremity supported;Left upper extremity supported;assistive device  -TR    Gait Balance-Level of Assistance Moderate assistance;Maximum assistance  -TC Minimum assistance;Moderate assistance  -TR    Gait Balance Support Right upper extremity supported;Left upper extremity supported;assistive device  -TC assistive device;Right upper extremity supported;Left upper extremity supported  -TR    Sensory Assessment/Intervention    Sensory Impairment --   unable to assess due to pt poor cognition  -TC --   No complaints per pt.   -TR    Positioning and Restraints    Pre-Treatment Position in bed  -TC in bed  -TR    Post Treatment Position bed  -TC chair  -TR    In Bed notified nsg;call light within reach;encouraged to call for assist;exit alarm on;with family/caregiver;side rails up x2;fowlers  -TC     In Chair  sitting;call light within reach;encouraged to call for assist;with family/caregiver  -TR      12/29/17 1135       General Information    Equipment Currently Used at Home rollator;commode;shower chair  -LISA     Living Environment    Lives With facility resident  -     Living Arrangements assisted living   [facility]     Type of Financial/Environmental Concern none  -     Transportation Available family or friend will provide  [facility]       User Key  (r) = Recorded By, (t) =  Taken By, (c) = Cosigned By    Initials Name Provider Type    TC Abby Sharp, PT DPT Physical Therapist    LISA Marks, MSW     TR Cindy Lux, OTR/L Occupational Therapist          Physical Therapy Education     Title: PT OT SLP Therapies (Active)     Topic: Physical Therapy (Active)     Point: Mobility training (Active)    Learning Progress Summary    Learner Readiness Method Response Comment Documented by Status   Patient Acceptance E NR Pt and dtr; POC, benefits of activity, posture, positioning in bed, pain  01/01/18 0955 Active   Family Acceptance E NR Pt and dtr; POC, benefits of activity, posture, positioning in bed, pain TC 01/01/18 0955 Active               Point: Body mechanics (Active)    Learning Progress Summary    Learner Readiness Method Response Comment Documented by Status   Patient Acceptance E NR Pt and dtr; POC, benefits of activity, posture, positioning in bed, pain  01/01/18 0955 Active   Family Acceptance E NR Pt and dtr; POC, benefits of activity, posture, positioning in bed, pain  01/01/18 0955 Active               Point: Precautions (Active)    Learning Progress Summary    Learner Readiness Method Response Comment Documented by Status   Patient Acceptance E NR Pt and dtr; POC, benefits of activity, posture, positioning in bed, pain  01/01/18 0955 Active   Family Acceptance E NR Pt and dtr; POC, benefits of activity, posture, positioning in bed, pain  01/01/18 0955 Active                      User Key     Initials Effective Dates Name Provider Type Discipline     06/01/17 -  Abby Sharp, PT DPT Physical Therapist PT                PT Recommendation and Plan  Planned Therapy Interventions: balance training, manual therapy techniques, neuromuscular re-education, patient/family education, postural re-education, ROM (Range of Motion), strengthening, stretching, transfer training, bed mobility training  Plan of Care Review  Plan Of Care Reviewed  With: patient, daughter  Progress: progress toward functional goals as expected  Outcome Summary/Follow up Plan: PT evaluation performed. Pt presents to us with increased generalized weakness RLE appearing to be more limited but this could be due to chronic back and right leg pain. She demonstrates poor tolerance to activity and decreased balance thus creating a further impairment regarding her gait. She performed bed mobility with Yue, sit<>stand with ModA due a posterior lean noted today and standing balance with rollator was poor to fair. She was able to ambulation post increased encouragement from therapist w/ her rollator and ModA for 10ft. She tended to learch over her RLE and this created a retropulsive type force causing a posterior lean with each step (requiring ModA from therapist). After 10ft to the doorway, she gestured to RLE pain and then reported the had to use the restroom at which point Nely the RN was seen and asked to assist therapist. Pt ambulated back to bed 10ft and MinAx2 and performed stand to sit with ModAx2, sit>supine w/ MaxAx2. Pt did not end up needing to use the restroom. She does require skilled therapy in order to improve her functional levels for return to her [facility] but due to her decreased cognitive status and physical deficits I believe she may benefit from a SNF post d/c acute prior to returning to the [facility] but we will continue to reassess as we work with her. Thank you for this referral.           IP PT Goals       01/01/18 0956          Bed Mobility PT LTG    Bed Mobility PT LTG, Date Established 01/01/18  -TC      Bed Mobility PT LTG, Activity Type all bed mobility  -TC      Bed Mobility PT LTG, [place] Level supervision required  -TC      Bed Mobility PT LTG, Additional Goal she has a hospital bed at home  -TC      Transfer Training PT LTG    Transfer Training PT LTG, Date Established 01/01/18  -TC      Transfer Training PT LTG, Time to Achieve by discharge  -TC       Transfer Training PT LTG, Activity Type bed to chair /chair to bed  -TC      Transfer Training PT LTG, [place] Level minimum assist (75% patient effort)  -TC      Transfer Training PT LTG, Assist Device walker, rolling   or rollator  -TC      Transfer Training 2 PT LTG    Transfer Training PT 2 LTG, Date Established 01/01/18  -TC      Transfer Training PT 2 LTG, Time to Achieve by discharge  -TC      Transfer Training PT 2 LTG, Activity Type sit to stand/stand to sit  -TC      Transfer Training PT 2 LTG, [place] Level contact guard assist  -TC      Transfer Training PT 2 LTG, Assist Device walker, rolling   or rollator  -TC      Gait Training PT LTG    Gait Training Goal PT LTG, Date Established 01/01/18  -TC      Gait Training Goal PT LTG, Time to Achieve by discharge  -TC      Gait Training Goal PT LTG, [place] Level minimum assist (75% patient effort)  -TC      Gait Training Goal PT LTG, Assist Device walker, rolling   or rollator  -TC      Gait Training Goal PT LTG, Distance to Achieve 30ft, with improved step length on the R  -TC      Strength Goal PT LTG    Strength Goal PT LTG, Date Established 01/01/18  -TC      Strength Goal PT LTG, Time to Achieve by discharge  -TC      Strength Goal PT LTG, Measure to Achieve 2x10 LE strengthening  -TC      Dynamic Sitting Balance PT LTG    Dynamic Sitting Balance PT LTG, Date Established 01/01/18  -      Dynamic Sitting Balance PT LTG, Time to Achieve by discharge  -TC      Dynamic Sitting Balance PT LTG, [place] Level verbal cues required;contact guard assist  -TC      Static Standing Balance PT LTG    Static Standing Balance PT LTG, Date Established 01/01/18  -      Static Standing Balance PT LTG, Time to Achieve by discharge  -TC      Static Standing Balance PT LTG, [place] Level contact guard assist;minimum assist (75% patient effort)  -TC      Static Standing Balance PT LTG, Assist Device UE Support  -TC        User Key  (r) = Recorded  By, (t) = Taken By, (c) = Cosigned By    Initials Name Provider Type    TC Abby Sharp, PT DPT Physical Therapist                Outcome Measures       01/01/18 0900 12/31/17 0800       How much help from another person do you currently need...    Turning from your back to your side while in flat bed without using bedrails? 3  -TC      Moving from lying on back to sitting on the side of a flat bed without bedrails? 2  -TC      Moving to and from a bed to a chair (including a wheelchair)? 2  -TC      Standing up from a chair using your arms (e.g., wheelchair, bedside chair)? 2  -TC      Climbing 3-5 steps with a railing? 1  -TC      To walk in hospital room? 2  -TC      AM-PAC 6 Clicks Score 12  -TC      How much help from another is currently needed...    Putting on and taking off regular lower body clothing?  2  -TR     Bathing (including washing, rinsing, and drying)  2  -TR     Toileting (which includes using toilet bed pan or urinal)  2  -TR     Putting on and taking off regular upper body clothing  2  -TR     Taking care of personal grooming (such as brushing teeth)  3  -TR     Eating meals  3  -TR     Score  14  -TR     Functional Assessment    Outcome Measure Options AM-PAC 6 Clicks Basic Mobility (PT)  -TC AM-PAC 6 Clicks Daily Activity (OT)  -TR       User Key  (r) = Recorded By, (t) = Taken By, (c) = Cosigned By    Initials Name Provider Type    TC Abby Sharp, PT DPT Physical Therapist    TR Cindy Lux, OTR/L Occupational Therapist           Time Calculation:         PT Charges       01/01/18 0954          Time Calculation    Start Time 0836  -TC      Stop Time 0931  -TC      Time Calculation (min) 55 min  -TC      PT Received On 01/01/18  -TC      PT Goal Re-Cert Due Date 01/11/18  -TC        User Key  (r) = Recorded By, (t) = Taken By, (c) = Cosigned By    Initials Name Provider Type    BRITTANY Sharp, PT DPT Physical Therapist          Therapy Charges for Today     Code  Description Service Date Service Provider Modifiers Qty    27346606957 HC PT MOBILITY CURRENT 1/1/2018 Abby Sharp, PT DPT GP, CL 1    23729104623 HC PT MOBILITY PROJECTED 1/1/2018 Abby Sharp, PT DPT GP, CK 1    70838861849 HC PT EVAL HIGH COMPLEXITY 4 1/1/2018 Abby Sharp, PT DPT GP, KX 1          PT G-Codes  Outcome Measure Options: AM-PAC 6 Clicks Basic Mobility (PT)  Score: 12  Functional Limitation: Mobility: Walking and moving around  Mobility: Walking and Moving Around Current Status (): At least 60 percent but less than 80 percent impaired, limited or restricted  Mobility: Walking and Moving Around Goal Status (): At least 40 percent but less than 60 percent impaired, limited or restricted      Abby Sharp, PT DPT  1/1/2018

## 2018-01-01 NOTE — PROGRESS NOTES
[facility] Medicine Services  INPATIENT PROGRESS NOTE    Length of Stay: 4  Date of Admission: 12/28/2017  Primary Care Physician: Mel Abraham MD    Subjective   Chief Complaint: Follow up   HPI   Pt is sitting in bed. Still with her eyes closed. She awakened when I called her name. She did not speak until I told her I was leaving. Daughter states she is playing possum this am. Still seems to be sleepy. We reviewed her medications and she has been getting seroquel. This is not a home medication for her. Await physical therapy evaluation.     Review of Systems   All pertinent negatives and positives are as above. All other systems have been reviewed and are negative unless otherwise stated.     Objective    Temp:  [97.1 °F (36.2 °C)-98.7 °F (37.1 °C)] 97.6 °F (36.4 °C)  Heart Rate:  [66-96] 66  Resp:  [16-20] 20  BP: ()/(33-68) 122/66  Physical Exam   Constitutional: She appears well-developed and well-nourished.   HENT:   Head: Normocephalic and atraumatic.   Eyes: Conjunctivae and EOM are normal. Pupils are equal, round, and reactive to light.   Neck: Neck supple. No JVD present. No thyromegaly present.   Cardiovascular: Normal rate, regular rhythm, normal heart sounds and intact distal pulses.  Exam reveals no gallop and no friction rub.    No murmur heard.  Sinus 75-98 with multiple PVC's bigeminy and trigeminy.    Pulmonary/Chest: Effort normal and breath sounds normal. No respiratory distress. She has no wheezes. She has no rales. She exhibits no tenderness.   Abdominal: Soft. Bowel sounds are normal. She exhibits no distension. There is no tenderness. There is no rebound and no guarding.   Musculoskeletal: Normal range of motion. She exhibits no edema, tenderness or deformity.   Lymphadenopathy:     She has no cervical adenopathy.   Neurological: She is alert. She displays normal reflexes. No cranial nerve deficit. She exhibits normal muscle tone.   More alert today.  Readily opens her eyes and then says Ok when I told her I was leaving.    Skin: Skin is warm and dry. No rash noted.   Psychiatric:   Unable to determine given pt not communicating at present.      Results Review:  I have reviewed the labs, radiology results, and diagnostic studies.    Laboratory Data:     Results from last 7 days  Lab Units 12/31/17  0620 12/30/17 0343 12/29/17 0621   WBC 10*3/mm3 4.46* 6.49 8.44   HEMOGLOBIN g/dL 14.0 12.2 12.9   HEMATOCRIT % 41.5 35.2* 39.1   PLATELETS 10*3/mm3 101* 89* 103*       Results from last 7 days  Lab Units 12/31/17  0620 12/30/17  0343 12/29/17  0621 12/28/17  1829   SODIUM mmol/L 144 135 141 142   POTASSIUM mmol/L 3.9 3.6 5.0 4.5   CHLORIDE mmol/L 106 102 105 100   CO2 mmol/L 24.0 22.0* 22.0* 23.0*   BUN mg/dL 14 16 24* 30*   CREATININE mg/dL 0.90 0.98 1.33 1.82*   CALCIUM mg/dL 9.2 8.5 8.7 9.0   BILIRUBIN mg/dL  --   --  0.5 0.7   ALK PHOS U/L  --   --  49 55   ALT (SGPT) U/L  --   --  37 39   AST (SGOT) U/L  --   --  35 42   GLUCOSE mg/dL 106* 100 133* 118*     Culture Data:   Blood Culture   Date Value Ref Range Status   12/28/2017 No growth at 3 days  Preliminary   12/28/2017 No growth at 3 days  Preliminary     Urine Culture   Date Value Ref Range Status   12/28/2017 No growth at 2 days  Final     Radiology Data:   Imaging Results (last 24 hours)     ** No results found for the last 24 hours. **        I have reviewed the patient current medications.     Assessment/Plan   Assessment:  1. Acute kidney injury- Daughter is unaware of any chronic kidney issues and patient has never seen a nephrologist. Improving   2. Urinary tract infection-no growth on cultures.   3. Acute metabolic encephalopathy secondary to above, improving  4. Non-insulin dependent Diabetes Mellitus Type 2, Hgb A1c 5.5. Had hypoglycemia yesterday, stable now  5. Dementia  6. Diabetic neuropathy  7. Essential hypertension  8. Hyperlipidemia  9. Chronic back pain  10. Constipation, improving  11.  Coronary artery disease  12. Hypothyroidism  13. Thrombocytopenia- appears chronic- slightly worse this admission  14. Elevated D-Dimer- low evidence for probability of PE on VQ scan    Plan:  1. Hold Seroquel for now to see if she awakens more  2. Physical therapy to see today. Discussed with therapist  3. Lab holiday for tomorrow.   4. Likely will need rehab.   5. Platelets are mildly improved today.     Discharge Planning: I expect the patient to be discharged to SNF in ? days.    Vivienne Cao, APRN   01/01/18   8:40 AM

## 2018-01-02 LAB
BACTERIA SPEC AEROBE CULT: NORMAL
BACTERIA SPEC AEROBE CULT: NORMAL
GLUCOSE BLDC GLUCOMTR-MCNC: 139 MG/DL (ref 70–130)
GLUCOSE BLDC GLUCOMTR-MCNC: 161 MG/DL (ref 70–130)
GLUCOSE BLDC GLUCOMTR-MCNC: 166 MG/DL (ref 70–130)
GLUCOSE BLDC GLUCOMTR-MCNC: 91 MG/DL (ref 70–130)

## 2018-01-02 PROCEDURE — 82962 GLUCOSE BLOOD TEST: CPT

## 2018-01-02 PROCEDURE — 97110 THERAPEUTIC EXERCISES: CPT | Performed by: OCCUPATIONAL THERAPIST

## 2018-01-02 PROCEDURE — 97530 THERAPEUTIC ACTIVITIES: CPT

## 2018-01-02 PROCEDURE — 25010000002 ONDANSETRON PER 1 MG: Performed by: INTERNAL MEDICINE

## 2018-01-02 RX ADMIN — NYSTATIN: 100000 CREAM TOPICAL at 20:57

## 2018-01-02 RX ADMIN — GLIPIZIDE 5 MG: 5 TABLET ORAL at 08:59

## 2018-01-02 RX ADMIN — FAMOTIDINE 40 MG: 20 TABLET ORAL at 08:59

## 2018-01-02 RX ADMIN — DONEPEZIL HYDROCHLORIDE 10 MG: 10 TABLET, FILM COATED ORAL at 20:57

## 2018-01-02 RX ADMIN — DOCUSATE SODIUM 100 MG: 100 CAPSULE ORAL at 20:57

## 2018-01-02 RX ADMIN — GABAPENTIN 300 MG: 300 CAPSULE ORAL at 20:57

## 2018-01-02 RX ADMIN — CEFDINIR 300 MG: 300 CAPSULE ORAL at 08:59

## 2018-01-02 RX ADMIN — ONDANSETRON 4 MG: 2 INJECTION, SOLUTION INTRAMUSCULAR; INTRAVENOUS at 15:32

## 2018-01-02 RX ADMIN — POTASSIUM CHLORIDE 20 MEQ: 1.5 POWDER, FOR SOLUTION ORAL at 08:59

## 2018-01-02 RX ADMIN — DOCUSATE SODIUM 100 MG: 100 CAPSULE ORAL at 08:59

## 2018-01-02 RX ADMIN — HYDROCODONE BITARTRATE AND ACETAMINOPHEN 1 TABLET: 5; 325 TABLET ORAL at 15:32

## 2018-01-02 RX ADMIN — CHOLECALCIFEROL (VITAMIN D3) 25 MCG (1,000 UNIT) TABLET 5000 UNITS: TABLET at 08:59

## 2018-01-02 RX ADMIN — ROSUVASTATIN CALCIUM 10 MG: 10 TABLET, FILM COATED ORAL at 08:59

## 2018-01-02 RX ADMIN — NYSTATIN: 100000 CREAM TOPICAL at 09:00

## 2018-01-02 RX ADMIN — GABAPENTIN 300 MG: 300 CAPSULE ORAL at 08:59

## 2018-01-02 RX ADMIN — Medication 81 MG: at 08:59

## 2018-01-02 NOTE — PLAN OF CARE
Problem: Patient Care Overview (Adult)  Goal: Plan of Care Review  Outcome: Ongoing (interventions implemented as appropriate)   01/02/18 1500   Coping/Psychosocial Response Interventions   Plan Of Care Reviewed With patient   Patient Care Overview   Progress progress toward functional goals is gradual   Outcome Evaluation   Outcome Summary/Follow up Plan Pt. agreed to sit EOB. Required min assist of 1. Only performed 10 reps of 1 LE ex. Refused to stand or participate any more. Layed pt. down. Will benefit from strengthening and increase activity as pt. allows.

## 2018-01-02 NOTE — PLAN OF CARE
Problem: Patient Care Overview (Adult)  Goal: Plan of Care Review  Outcome: Ongoing (interventions implemented as appropriate)   01/02/18 3077   Coping/Psychosocial Response Interventions   Plan Of Care Reviewed With patient   Patient Care Overview   Progress no change   Outcome Evaluation   Outcome Summary/Follow up Plan Patient has her mind set on going home, she gets very agitated when the subject comes up. C/O pain once and medicated per PRN orders with relief. Turn Q hours. Incont. care provided. Continue to monitor overall condition and notify Md of any changes.        Problem: Renal Failure/Kidney Injury, Acute (Adult)  Goal: Signs and Symptoms of Listed Potential Problems Will be Absent or Manageable (Renal Failure/Kidney Injury, Acute)  Outcome: Ongoing (interventions implemented as appropriate)      Problem: Nutrition, Imbalanced: Inadequate Oral Intake (Adult)  Goal: Identify Related Risk Factors and Signs and Symptoms  Outcome: Ongoing (interventions implemented as appropriate)      Problem: Pressure Ulcer Risk (Prince Scale) (Adult,Obstetrics,Pediatric)  Goal: Identify Related Risk Factors and Signs and Symptoms  Outcome: Ongoing (interventions implemented as appropriate)      Problem: Skin Integrity Impairment, Risk/Actual (Adult)  Goal: Identify Related Risk Factors and Signs and Symptoms  Outcome: Ongoing (interventions implemented as appropriate)      Problem: Infection, Risk/Actual (Adult)  Goal: Identify Related Risk Factors and Signs and Symptoms  Outcome: Ongoing (interventions implemented as appropriate)      Problem: Cardiac: Heart Failure (Adult)  Goal: Signs and Symptoms of Listed Potential Problems Will be Absent or Manageable (Cardiac: Heart Failure)  Outcome: Ongoing (interventions implemented as appropriate)

## 2018-01-02 NOTE — PLAN OF CARE
Problem: Patient Care Overview (Adult)  Goal: Plan of Care Review  Outcome: Ongoing (interventions implemented as appropriate)   01/02/18 0353   Coping/Psychosocial Response Interventions   Plan Of Care Reviewed With patient   Patient Care Overview   Progress progress toward functional goals as expected   Outcome Evaluation   Outcome Summary/Follow up Plan VSS BP WNL SR 72-84 with 1st degree PAC PVC mf Quad on tele. Patient more alert this shift. Prince protocol and fall protocol. Bed alarm on. Possible discharge to Assisted Living or NHP today?      Goal: Adult Individualization and Mutuality  Outcome: Ongoing (interventions implemented as appropriate)    Goal: Discharge Needs Assessment  Outcome: Ongoing (interventions implemented as appropriate)      Problem: Renal Failure/Kidney Injury, Acute (Adult)  Goal: Signs and Symptoms of Listed Potential Problems Will be Absent or Manageable (Renal Failure/Kidney Injury, Acute)  Outcome: Ongoing (interventions implemented as appropriate)      Problem: Fall Risk (Adult)  Goal: Identify Related Risk Factors and Signs and Symptoms  Outcome: Ongoing (interventions implemented as appropriate)    Goal: Absence of Falls  Outcome: Ongoing (interventions implemented as appropriate)      Problem: Nutrition, Imbalanced: Inadequate Oral Intake (Adult)  Goal: Identify Related Risk Factors and Signs and Symptoms  Outcome: Ongoing (interventions implemented as appropriate)    Goal: Improved Oral Intake  Outcome: Ongoing (interventions implemented as appropriate)    Goal: Prevent Further Weight Loss  Outcome: Ongoing (interventions implemented as appropriate)      Problem: Pressure Ulcer Risk (Prince Scale) (Adult,Obstetrics,Pediatric)  Goal: Identify Related Risk Factors and Signs and Symptoms  Outcome: Ongoing (interventions implemented as appropriate)    Goal: Skin Integrity  Outcome: Ongoing (interventions implemented as appropriate)      Problem: Skin Integrity Impairment,  Risk/Actual (Adult)  Goal: Identify Related Risk Factors and Signs and Symptoms  Outcome: Ongoing (interventions implemented as appropriate)    Goal: Skin Integrity/Wound Healing  Outcome: Ongoing (interventions implemented as appropriate)      Problem: Infection, Risk/Actual (Adult)  Goal: Identify Related Risk Factors and Signs and Symptoms  Outcome: Ongoing (interventions implemented as appropriate)    Goal: Infection Prevention/Resolution  Outcome: Ongoing (interventions implemented as appropriate)      Problem: Cardiac: Heart Failure (Adult)  Goal: Signs and Symptoms of Listed Potential Problems Will be Absent or Manageable (Cardiac: Heart Failure)  Outcome: Ongoing (interventions implemented as appropriate)

## 2018-01-02 NOTE — PLAN OF CARE
Problem: Patient Care Overview (Adult)  Goal: Plan of Care Review  Outcome: Ongoing (interventions implemented as appropriate)   01/02/18 1637   Patient Care Overview   Progress progress toward functional goals is gradual   Outcome Evaluation   Outcome Summary/Follow up Plan Continue to follow for increased po intake, and d/c needs       Problem: Nutrition, Imbalanced: Inadequate Oral Intake (Adult)  Goal: Identify Related Risk Factors and Signs and Symptoms  Outcome: Ongoing (interventions implemented as appropriate)   01/02/18 1637   Nutrition, Imbalanced: Inadequate Oral Intake   Nutrition Imbalanced: Less than Body Requirements: Related Risk Factors appetite decreased   Signs and Symptoms (Nutrition Imbalance, Inadequate Oral Intake: Signs and Symptoms) (Pt has been refusing to eat)     Goal: Improved Oral Intake  Outcome: Ongoing (interventions implemented as appropriate)  PO intake poor at this time, but supplements are being offered   01/02/18 1637   Nutrition, Imbalanced: Inadequate Oral Intake (Adult)   Improved Oral Intake making progress toward outcome     Goal: Prevent Further Weight Loss  Outcome: Ongoing (interventions implemented as appropriate)   01/02/18 1637   Nutrition, Imbalanced: Inadequate Oral Intake (Adult)   Prevent Further Weight Loss making progress toward outcome

## 2018-01-02 NOTE — THERAPY EVALUATION
Acute Care - Occupational Therapy Initial Evaluation  [facility]     Patient Name: Marina Hyman  : 1935  MRN: [id number]  Today's Date: 2018  Onset of Illness/Injury or Date of Surgery Date: 17  Date of Referral to OT: 17  Referring Physician: Dr Padgett    Admit Date: 2017       ICD-10-CM ICD-9-CM   1. ELIECER (acute kidney injury) N17.9 584.9   2. Dehydration E86.0 276.51   3. Hypoxemia R09.02 799.02   4. Altered mental status, unspecified altered mental status type R41.82 780.97   5. Weakness R53.1 780.79   6. Impaired mobility and ADLs Z74.09 799.89   7. Impaired functional mobility, balance, gait, and endurance Z74.09 V49.89     Patient Active Problem List   Diagnosis   • ELIECER (acute kidney injury)     Past Medical History:   Diagnosis Date   • CHF (congestive heart failure)    • Diabetes mellitus      Past Surgical History:   Procedure Laterality Date   • AORTIC VALVE REPAIR/REPLACEMENT            OT ASSESSMENT FLOWSHEET (last 72 hours)      OT Evaluation       18 1125 18 0836 18 0800 17 0730       Rehab Evaluation    Document Type therapy note (daily note)  -MM evaluation  -TC  evaluation   See MAR  -TR     Subjective Information agree to therapy;complains of;fatigue  -MM agree to therapy;complains of  -TC  agree to therapy;complains of;fatigue  -TR     Patient Effort, Rehab Treatment    adequate  -TR     Symptoms Noted During/After Treatment    other (see comments)  -TR     Symptoms Noted Comment  Increased cues required for participation and to follow cues from both PT and daughter.   -TC  Increased cues required for participation from OT and daughter.   -TR     General Information    Patient Profile Review  yes  -TC  yes  -TR     Onset of Illness/Injury or Date of Surgery Date  17  -TC  17  -TR     Referring Physician  Dr Padgett  -TC  CARLEY Cordoba  -TR     General Observations    Fowlers, sleeping, telemetry, SCDs, daughter present  "and gave consent for OT to eval.   -TR     Pertinent History Of Current Problem  Pt admitted from snf following increased confusion, LLQ and back pain, weakness and difficulty ambulating. CXR, CT head, CT abd/pelvis, lung ventilation perfusion negative for acute findings. Dx: ELIECER, dehydration, hypoxemia, AMS, weakness, UTI, acute metabolic encephalopathy, HTN, CAD, hypothyroidism, thrombocytopenia, elevated D-dimer.   -TC  Pt admitted from [facility] following increased confusion, LLQ and back pain, weakness and difficulty ambulating. CXR, CT head, CT abd/pelvis, lung ventilation perfusion negative for acute findings. Dx: ELIECER, dehydration, hypoxemia, AMS, weakness, UTI, acute metabolic encephalopathy, HTN, CAD, hypothyroidism, thrombocytopenia, elevated D-dimer.   -TR     Precautions/Limitations fall precautions;other (see comments)   Alzheimer's  -MM fall precautions   Alzheimers;Goes by \"Lidia\"responds well to males&compliments  -TC  fall precautions  -TR     Prior Level of Function  independent:;all household mobility;gait;transfer;dressing;grooming;feeding;mod assist:;bathing;max assist:;home management;cooking;cleaning;dependent:;driving  -TC  independent:;all household mobility;gait;transfer;dressing;grooming;feeding;mod assist:;bathing;max assist:;home management;cooking;cleaning;dependent:;driving  -TR     Equipment Currently Used at Home  rollator;commode;shower chair;hospital bed  -TC  rollator;commode;shower chair;hospital bed  -TR     Plans/Goals Discussed With  agreed upon;patient and family  -TC  patient and family;agreed upon  -TR     Risks Reviewed  patient and family:;LOB;nausea/vomiting;dizziness;increased discomfort  -TC  patient and family:;LOB;dizziness;increased discomfort;change in vital signs  -TR     Benefits Reviewed  patient and family:;improve function;increase independence;increase strength;increase balance;decrease pain  -TC  patient and family:;improve function;increase independence;increase " strength;increase balance;decrease pain;increase knowledge  -TR     Barriers to Rehab  cognitive status;previous functional deficit   chronic back and Rt leg pain;  -TC  cognitive status;previous functional deficit  -TR     Living Environment    Lives With  facility resident  -TC  facility resident  -TR     Living Arrangements  assisted living   [facility]  -  assisted living   [place]  -TR     Home Accessibility  no concerns  -TC  no concerns  -TR     Clinical Impression    Date of Referral to OT    12/29/17  -TR     OT Diagnosis    Impaired mobility and ADL.  -TR     Impairments Found (describe specific impairments)    aerobic capacity/endurance;arousal, attention, and cognition;ergonomics and body mechanics;gait, locomotion, and balance;muscle performance;posture;ROM  -TR     Patient/Family Goals Statement    Return to [facility] with home health.  -TR     Criteria for Skilled Therapeutic Interventions Met    yes;treatment indicated  -TR     Rehab Potential    fair, will monitor progress closely  -TR     Therapy Frequency    3-5 times/wk  -TR     Predicted Duration of Therapy Intervention (days/wks)    Until d/c from facility.  -TR     Anticipated Equipment Needs At Discharge    --   None  -TR     Anticipated Discharge Disposition    skilled nursing facility;assisted living;home with home health   Depending on progress.  -TR     Pain Assessment    Pain Assessment No/denies pain  -MM Villasenor-Juarez FACES  -TC  No/denies pain  -TR     Villasenor-Juarez FACES Pain Rating  6   went from 2 on villasenor baker to 6 w/ ambulation/movement  -TC       Pain Type  Chronic pain  -TC       Pain Location  Back   and right leg  -TC       Pain Orientation  Right  -TC       Pain Intervention(s)  Repositioned  -TC       Response to Interventions  tolerated; nursing asked if pt could recieve pain medication  -TC       Vision Assessment/Intervention    Visual Impairment  WFL with corrective lenses  -TC  WFL with corrective lenses  -TR     Cognitive  Assessment/Intervention    Current Cognitive/Communication Assessment  impaired  -TC  impaired  -TR     Orientation Status  oriented to;person;disoriented to;place;time;situation  -TC  oriented to;person;disoriented to;place;time;situation  -TR     Follows Commands/Answers Questions  able to follow single-step instructions;50% of the time   required max motivation and encouragement  -TC  able to follow single-step instructions;50% of the time;needs cueing;needs repetition  -TR     Personal Safety  decreased awareness, need for assist;decreased awareness, need for safety;decreased insight to deficits  -TC  decreased awareness, need for assist;decreased awareness, need for safety  -TR     Personal Safety Interventions  fall prevention program maintained;gait belt;muscle strengthening facilitated;nonskid shoes/slippers when out of bed   tennis shoes  -TC  gait belt;fall prevention program maintained;nonskid shoes/slippers when out of bed;supervised activity  -TR     ROM (Range of Motion)    General ROM Detail  unable to assess LE AROM due to her decreased ability to follow commands. Functionally WFLs, had the available ROM to be able to lean over and don/doff shoes today with Yue and Dep for tying this morning;   -TC  Unable to accurately assess due to difficulty following commands. B UE AROM Grossly WFL.  -TR     MMT (Manual Muscle Testing)    General MMT Assessment Detail  unable to truly assess due to dec ability to follow commands; functionally LLE 4-/5 and RLE 3+ to 4-/5 (tended to drag RLE with grimmacing noted and therefore I believe this is moreso due to her chronic pain vs strength)   -TC  Functionally 4-/5  -TR     Muscle Tone Assessment    Muscle Tone Assessment   --   WFL.   -DS --   WFL.   -TR     Bed Mobility, Assessment/Treatment    Bed Mobility, Assistive Device  bed rails;head of bed elevated  -TC  bed rails;head of bed elevated;draw sheet  -TR     Bed Mobility, Scoot/Bridge, [place]  2 person  assist required;maximum assist (25% patient effort)   up in bed   -TC       Bed Mob, Supine to Sit, Owen  verbal cues required;nonverbal cues required (demo/gesture);minimum assist (75% patient effort)   increased time to perform but able to do so with Yue   -TC  moderate assist (50% patient effort);verbal cues required  -TR     Bed Mob, Sit to Supine, Owen  verbal cues required;nonverbal cues required (demo/gesture);maximum assist (25% patient effort);2 person assist required  -TC       Bed Mobility, Safety Issues  cognitive deficits limit understanding;decreased use of arms for pushing/pulling;decreased use of legs for bridging/pushing  -TC  decreased use of arms for pushing/pulling;decreased use of legs for bridging/pushing  -TR     Bed Mobility, Impairments  strength decreased;impaired balance;motor control impaired;pain   cognition  -TC  strength decreased  -TR     Transfer Assessment/Treatment    Transfers, Sit-Stand Owen  verbal cues required;nonverbal cues required (demo/gesture);minimum assist (75% patient effort);moderate assist (50% patient effort)   posterior lean today  -TC  minimum assist (75% patient effort);moderate assist (50% patient effort);verbal cues required  -TR     Transfers, Stand-Sit Owen  verbal cues required;nonverbal cues required (demo/gesture);moderate assist (50% patient effort)   post walking   -TC  minimum assist (75% patient effort);moderate assist (50% patient effort);verbal cues required  -TR     Transfers, Sit-Stand-Sit, Assist Device    --   Rollator  -TR     Toilet Transfer, Owen    moderate assist (50% patient effort);verbal cues required  -TR     Toilet Transfer, Assistive Device    --   Rollator, grab bar  -TR     Transfer, Impairments  strength decreased;impaired balance;motor control impaired;pain   cognition  -TC  impaired balance;strength decreased  -TR     Functional Mobility    Functional Mobility- Ind. Level    minimum assist  (75% patient effort);moderate assist (50% patient effort)  -TR     Functional Mobility- Device    rollator  -TR     Functional Mobility-Distance (Feet)    15   x2 attempts  -TR     Functional Mobility- Safety Issues    balance decreased during turns;sequencing ability decreased;step length decreased;weight-shifting ability decreased;loses balance backward  -TR     Functional Mobility- Comment    Bed to toilet to chair.  -TR     Stairs Assessment/Treatment    Stairs, Comment  deferred for safety and pt will not have at [facility]   -TC       Lower Body Dressing Assessment/Training    LB Dressing Assess/Train, Clothing Type    doffing:;donning:   Brief  -TR     LB Dressing Assess/Train, Position    sitting;standing  -TR     LB Dressing Assess/Train, [place]    maximum assist (25% patient effort);verbal cues required  -TR     LB Dressing Assess/Train, Impairments    impaired balance;strength decreased  -TR     Toileting Assessment/Training    Toileting Assess/Train, Assistive Device    grab bars  -TR     Toileting Assess/Train, Position    sitting  -TR     Toileting Assess/Train, Indepen Level    maximum assist (25% patient effort)  -TR     Toileting Assess/Train, Impairments    impaired balance;strength decreased  -TR     Toileting Assess/Train, Comment    Max A for hygiene and clothing mgt.   -TR     Grooming Assessment/Training    Grooming Assess/Train, Position    sitting  -TR     Grooming Assess/Train, Indepen Level    supervision required  -TR     Grooming Assess/Train, Comment    To wash face after set-up.   -TR     Motor Skills/Interventions    Additional Documentation  Balance Skills Training (Group)  -TC  Balance Skills Training (Group)  -TR     Balance Skills Training    Sitting-Level of Assistance  Contact guard;Minimum assistance   Yue initially and w/ donning shoes; otherwise CGA for sway  -TC  Contact guard;Minimum assistance  -TR     Sitting-Balance Support  Right upper extremity supported;Left upper  extremity supported;Feet supported  -TC  Feet supported;Right upper extremity supported;Left upper extremity supported  -TR     Standing-Level of Assistance  Moderate assistance;Minimum assistance  -TC  Contact guard;Minimum assistance  -TR     Static Standing Balance Support  Right upper extremity supported;Left upper extremity supported;assistive device  -TC  Right upper extremity supported;Left upper extremity supported;assistive device  -TR     Gait Balance-Level of Assistance  Moderate assistance;Maximum assistance  -TC  Minimum assistance;Moderate assistance  -TR     Gait Balance Support  Right upper extremity supported;Left upper extremity supported;assistive device  -TC  assistive device;Right upper extremity supported;Left upper extremity supported  -TR     Therapy Exercises    Bilateral Upper Extremity AROM:;20 reps;hand pumps;elbow flexion/extension;shoulder extension/flexion   wrist flex/ext, fowlers, mod verbal cues and rest breaks  -MM        Sensory Assessment/Intervention    Sensory Impairment  --   unable to assess due to pt poor cognition  -TC  --   No complaints per pt.   -TR     General Therapy Interventions    Planned Therapy Interventions    activity intolerance;ADL retraining;balance training;bed mobility training;energy conservation;home exercise program;ROM (Range of Motion);strengthening;transfer training  -TR     Positioning and Restraints    Pre-Treatment Position in bed  -MM in bed  -TC  in bed  -TR     Post Treatment Position bed  -MM bed  -TC  chair  -TR     In Bed notified nsg;fowlers;call light within reach;encouraged to call for assist;with family/caregiver;side rails up x2  -MM notified nsg;call light within reach;encouraged to call for assist;exit alarm on;with family/caregiver;side rails up x2;fowlers  -TC       In Chair    sitting;call light within reach;encouraged to call for assist;with family/caregiver  -TR       User Key  (r) = Recorded By, (t) = Taken By, (c) = Cosigned By     Initials Name Effective Dates    DS Nely Oviedo, RN 08/02/16 -     TC Abby Sharp, PT DPT 06/01/17 -     TR Cindy Lux, OTR/L 06/22/15 -     MM Amrik Miller, OTR/L 10/21/16 -            Occupational Therapy Education     Title: PT OT SLP Therapies (Active)     Topic: Occupational Therapy (Active)     Point: ADL training (Done)    Description: Instruct learner(s) on proper safety adaptation and remediation techniques during self care or transfers.   Instruct in proper use of assistive devices.    Learning Progress Summary    Learner Readiness Method Response Comment Documented by Status   Patient Acceptance D,E VU,NR Education provided on purpose of OT eval, impairments found, need for continued intervention and d/c planning. TR 12/31/17 0830 Done   Family Acceptance D,E VU,NR Education provided on purpose of OT eval, impairments found, need for continued intervention and d/c planning. TR 12/31/17 0830 Done               Point: Home exercise program (Active)    Description: Instruct learner(s) on appropriate technique for monitoring, assisting and/or progressing therapeutic exercises/activities.    Learning Progress Summary    Learner Readiness Method Response Comment Documented by Status   Patient Acceptance E NR verbally educated with demonstration on BUE HEP MM 01/02/18 1151 Active               Point: Precautions (Done)    Description: Instruct learner(s) on prescribed precautions during self-care and functional transfers.    Learning Progress Summary    Learner Readiness Method Response Comment Documented by Status   Patient Acceptance D,E VU,NR Education provided on purpose of OT eval, impairments found, need for continued intervention and d/c planning. TR 12/31/17 0830 Done   Family Acceptance D,E VU,NR Education provided on purpose of OT eval, impairments found, need for continued intervention and d/c planning. TR 12/31/17 0830 Done               Point: Body mechanics (Done)     Description: Instruct learner(s) on proper positioning and spine alignment during self-care, functional mobility activities and/or exercises.    Learning Progress Summary    Learner Readiness Method Response Comment Documented by Status   Patient Acceptance D,E VU,NR Education provided on purpose of OT eval, impairments found, need for continued intervention and d/c planning. TR 12/31/17 0830 Done   Family Acceptance D,E VU,NR Education provided on purpose of OT eval, impairments found, need for continued intervention and d/c planning. TR 12/31/17 0830 Done                      User Key     Initials Effective Dates Name Provider Type Discipline    TR 06/22/15 -  Cindy Lux, OTR/L Occupational Therapist OT    MM 10/21/16 -  Amrik Miller, OTR/L Occupational Therapist OT                  OT Recommendation and Plan  Anticipated Equipment Needs At Discharge:  (None)  Anticipated Discharge Disposition: skilled nursing facility, assisted living, home with home health (Depending on progress.)  Planned Therapy Interventions: activity intolerance, ADL retraining, balance training, bed mobility training, energy conservation, home exercise program, ROM (Range of Motion), strengthening, transfer training  Therapy Frequency: 3-5 times/wk  Plan of Care Review  Plan Of Care Reviewed With: patient  Progress: progress toward functional goals as expected  Outcome Summary/Follow up Plan: OT tx completed. Upon entering room, pt has babydoll in lap tending to it, trying to swaddle it. Pt c/o fatigue, but no c/o pain. Pt completed 20 reps of BUE ther ex with mod verbal cuing and rest breaks. Cont OT POC.          OT Goals       12/31/17 0831          Transfer Training OT LTG    Transfer Training OT LTG, Date Established 12/31/17  -TR      Transfer Training OT LTG, Time to Achieve by discharge  -TR      Transfer Training OT LTG, Activity Type bed to chair /chair to bed;sit to stand/stand to sit;toilet  -TR      Transfer Training  OT LTG, [place] Level contact guard assist  -TR      Transfer Training OT LTG, Assist Device walker, rolling  -TR      Transfer Training OT LTG, Additional Goal Using RW or rollator as needed.   -TR      Toileting OT LTG    Toileting Goal OT LTG, Date Established 12/31/17  -TR      Toileting Goal OT LTG, Time to Achieve by discharge  -TR      Toileting Goal OT LTG, [place] Level minimum assist (75% patient effort)  -TR      ADL OT LTG    ADL OT LTG, Date Established 12/31/17  -TR      ADL OT LTG, Time to Achieve by discharge  -TR      ADL OT LTG, Activity Type ADL skills  -TR      ADL OT LTG, [place] Level standby assist  -TR      ADL OT LTG, Additional Goal UB dressing and grooming tasks while seated.   -TR        User Key  (r) = Recorded By, (t) = Taken By, (c) = Cosigned By    Initials Name Provider Type    TR Cindy Lux, OTR/L Occupational Therapist                Outcome Measures       01/02/18 1100 01/01/18 0900 12/31/17 0800    How much help from another person do you currently need...    Turning from your back to your side while in flat bed without using bedrails?  3  -TC     Moving from lying on back to sitting on the side of a flat bed without bedrails?  2  -TC     Moving to and from a bed to a chair (including a wheelchair)?  2  -TC     Standing up from a chair using your arms (e.g., wheelchair, bedside chair)?  2  -TC     Climbing 3-5 steps with a railing?  1  -TC     To walk in hospital room?  2  -TC     AM-PAC 6 Clicks Score  12  -TC     How much help from another is currently needed...    Putting on and taking off regular lower body clothing? 2  -MM  2  -TR    Bathing (including washing, rinsing, and drying) 2  -MM  2  -TR    Toileting (which includes using toilet bed pan or urinal) 2  -MM  2  -TR    Putting on and taking off regular upper body clothing 2  -MM  2  -TR    Taking care of personal grooming (such as brushing teeth) 3  -MM  3  -TR    Eating meals 3  -MM  3  -TR     Score 14  -MM  14  -TR    Functional Assessment    Outcome Measure Options AM-PAC 6 Clicks Daily Activity (OT)  -MM AM-PAC 6 Clicks Basic Mobility (PT)  -TC AM-PAC 6 Clicks Daily Activity (OT)  -TR      User Key  (r) = Recorded By, (t) = Taken By, (c) = Cosigned By    Initials Name Provider Type    TC Abby Sharp, PT DPT Physical Therapist    TR Cindy Lux, OTR/L Occupational Therapist    MM Amrik Miller OTR/L Occupational Therapist          Time Calculation:   OT Start Time: 1125  OT Stop Time: 1148  OT Time Calculation (min): 23 min    Therapy Charges for Today     Code Description Service Date Service Provider Modifiers Qty    86019332994 HC OT THER PROC EA 15 MIN 1/2/2018 Amrik Miller OTR/L GO, KX 2          OT G-codes  OT Professional Judgement Used?: Yes  OT Functional Scales Options: AM-PAC 6 Clicks Daily Activity (OT)  Score: 14  Functional Limitation: Self care  Self Care Current Status (): At least 40 percent but less than 60 percent impaired, limited or restricted  Self Care Goal Status (): At least 20 percent but less than 40 percent impaired, limited or restricted    BENITEZ Foster/SCOTTIE  1/2/2018

## 2018-01-02 NOTE — PROGRESS NOTES
Continued Stay Note   [facility]     Patient Name: Marina Hyman  MRN: [id number]  Today's Date: 1/2/2018    Admit Date: 12/28/2017          Discharge Plan       01/02/18 1630    Case Management/Social Work Plan    Plan PT requires rehab. Daughter has requested PT to be listed with [facility]. PT has been listed, will await bed offer or denial.     Patient/Family In Agreement With Plan yes              Discharge Codes     None            ELVIRA Montez

## 2018-01-02 NOTE — PLAN OF CARE
Problem: Patient Care Overview (Adult)  Goal: Plan of Care Review  Outcome: Ongoing (interventions implemented as appropriate)   01/02/18 1151   Coping/Psychosocial Response Interventions   Plan Of Care Reviewed With patient   Patient Care Overview   Progress progress toward functional goals as expected   Outcome Evaluation   Outcome Summary/Follow up Plan OT tx completed. Upon entering room, pt has babydoll in lap tending to it, trying to swaddle it. Pt c/o fatigue, but no c/o pain. Pt completed 20 reps of BUE ther ex with mod verbal cuing and rest breaks. Cont OT POC.

## 2018-01-02 NOTE — PROGRESS NOTES
[facility] Medicine Services  INPATIENT PROGRESS NOTE    Length of Stay: 5  Date of Admission: 12/28/2017  Primary Care Physician: Mle Abraham MD    Subjective   Chief Complaint: follow up   HPI   Pt is more awake this am. She peers at me more frequently. She does not speak. She apparently carried on conversations with her night nurse Trixie last night. She identified her daughter and knew where she was going. She opens her eyes frequently and then will shut them quickly if I look at her. No complaints from family today. Having bowel movements.     Review of Systems   Difficult to ascertain given her non-verbal response to me.     Objective    Temp:  [96.8 °F (36 °C)-98.4 °F (36.9 °C)] 96.8 °F (36 °C)  Heart Rate:  [61-81] 79  Resp:  [16-20] 18  BP: (102-145)/(55-76) 145/76  Physical Exam   Constitutional: She appears well-developed and well-nourished.   HENT:   Head: Normocephalic and atraumatic.   Eyes: Conjunctivae and EOM are normal. Pupils are equal, round, and reactive to light.   Neck: Neck supple. No JVD present. No thyromegaly present.   Cardiovascular: Normal rate, regular rhythm, normal heart sounds and intact distal pulses.  Exam reveals no gallop and no friction rub.    No murmur heard.  Pulmonary/Chest: Effort normal and breath sounds normal. No respiratory distress. She has no wheezes. She has no rales. She exhibits no tenderness.   Abdominal: Soft. Bowel sounds are normal. She exhibits no distension. There is no tenderness. There is no rebound and no guarding.   Musculoskeletal: Normal range of motion. She exhibits no edema, tenderness or deformity.   Lymphadenopathy:     She has no cervical adenopathy.   Neurological: She is alert. She displays normal reflexes. No cranial nerve deficit. She exhibits normal muscle tone.   Skin: Skin is warm and dry. No rash noted.   Psychiatric: She has a normal mood and affect. Her behavior is normal. Judgment and thought  content normal.     Results Review:  I have reviewed the labs, radiology results, and diagnostic studies.    Laboratory Data:     Results from last 7 days  Lab Units 12/31/17  0620 12/30/17 0343 12/29/17  0621   WBC 10*3/mm3 4.46* 6.49 8.44   HEMOGLOBIN g/dL 14.0 12.2 12.9   HEMATOCRIT % 41.5 35.2* 39.1   PLATELETS 10*3/mm3 101* 89* 103*       Results from last 7 days  Lab Units 12/31/17  0620 12/30/17  0343 12/29/17  0621 12/28/17  1829   SODIUM mmol/L 144 135 141 142   POTASSIUM mmol/L 3.9 3.6 5.0 4.5   CHLORIDE mmol/L 106 102 105 100   CO2 mmol/L 24.0 22.0* 22.0* 23.0*   BUN mg/dL 14 16 24* 30*   CREATININE mg/dL 0.90 0.98 1.33 1.82*   CALCIUM mg/dL 9.2 8.5 8.7 9.0   BILIRUBIN mg/dL  --   --  0.5 0.7   ALK PHOS U/L  --   --  49 55   ALT (SGPT) U/L  --   --  37 39   AST (SGOT) U/L  --   --  35 42   GLUCOSE mg/dL 106* 100 133* 118*     Culture Data:   Blood Culture   Date Value Ref Range Status   12/28/2017 No growth at 4 days  Preliminary   12/28/2017 No growth at 4 days  Preliminary     Urine Culture   Date Value Ref Range Status   12/28/2017 No growth at 2 days  Final     Radiology Data:   Imaging Results (last 24 hours)     ** No results found for the last 24 hours. **          I have reviewed the patient current medications.     Assessment/Plan   Assessment:  1. Acute kidney injury- Daughter is unaware of any chronic kidney issues and patient has never seen a nephrologist. Improving   2. Urinary tract infection-no growth on cultures.   3. Acute metabolic encephalopathy secondary to above, improving  4. Non-insulin dependent Diabetes Mellitus Type 2, Hgb A1c 5.5. Had hypoglycemia yesterday, stable now  5. Dementia  6. Diabetic neuropathy  7. Essential hypertension  8. Hyperlipidemia  9. Chronic back pain  10. Constipation, improving  11. Coronary artery disease  12. Hypothyroidism  13. Thrombocytopenia- appears chronic- slightly worse this admission  14. Elevated D-Dimer- low evidence for probability of PE on  VQ scan     Plan:  1. Off antibiotics.   2. Will need outpatient follow up with urology.   3.  to discuss with daughter about SNF placement.   4. Hopefully to nursing home soon.     Discharge Planning: I expect the patient to be discharged to SNF in ? days.    Vivienne Cao, CARLEY   01/02/18   8:15 AM

## 2018-01-03 LAB
ANION GAP SERPL CALCULATED.3IONS-SCNC: 10 MMOL/L (ref 4–13)
BACTERIA UR QL AUTO: ABNORMAL /HPF
BILIRUB UR QL STRIP: NEGATIVE
BUN BLD-MCNC: 9 MG/DL (ref 5–21)
BUN/CREAT SERPL: 11.1 (ref 7–25)
CALCIUM SPEC-SCNC: 9.8 MG/DL (ref 8.4–10.4)
CHLORIDE SERPL-SCNC: 102 MMOL/L (ref 98–110)
CLARITY UR: CLEAR
CO2 SERPL-SCNC: 30 MMOL/L (ref 24–31)
COLOR UR: YELLOW
CREAT BLD-MCNC: 0.81 MG/DL (ref 0.5–1.4)
DEPRECATED RDW RBC AUTO: 43.1 FL (ref 40–54)
ERYTHROCYTE [DISTWIDTH] IN BLOOD BY AUTOMATED COUNT: 13.9 % (ref 12–15)
GFR SERPL CREATININE-BSD FRML MDRD: 68 ML/MIN/1.73
GLUCOSE BLD-MCNC: 133 MG/DL (ref 70–100)
GLUCOSE BLDC GLUCOMTR-MCNC: 132 MG/DL (ref 70–130)
GLUCOSE BLDC GLUCOMTR-MCNC: 135 MG/DL (ref 70–130)
GLUCOSE BLDC GLUCOMTR-MCNC: 161 MG/DL (ref 70–130)
GLUCOSE BLDC GLUCOMTR-MCNC: 89 MG/DL (ref 70–130)
GLUCOSE UR STRIP-MCNC: ABNORMAL MG/DL
HCT VFR BLD AUTO: 40.9 % (ref 37–47)
HGB BLD-MCNC: 13.5 G/DL (ref 12–16)
HGB UR QL STRIP.AUTO: NEGATIVE
HYALINE CASTS UR QL AUTO: ABNORMAL /LPF
KETONES UR QL STRIP: NEGATIVE
LEUKOCYTE ESTERASE UR QL STRIP.AUTO: NEGATIVE
MCH RBC QN AUTO: 28.4 PG (ref 28–32)
MCHC RBC AUTO-ENTMCNC: 33 G/DL (ref 33–36)
MCV RBC AUTO: 86.1 FL (ref 82–98)
NITRITE UR QL STRIP: NEGATIVE
PH UR STRIP.AUTO: 7.5 [PH] (ref 5–8)
PLATELET # BLD AUTO: 159 10*3/MM3 (ref 130–400)
PMV BLD AUTO: 10.9 FL (ref 6–12)
POTASSIUM BLD-SCNC: 2.9 MMOL/L (ref 3.5–5.3)
PROT UR QL STRIP: ABNORMAL
RBC # BLD AUTO: 4.75 10*6/MM3 (ref 4.2–5.4)
RBC # UR: ABNORMAL /HPF
REF LAB TEST METHOD: ABNORMAL
SODIUM BLD-SCNC: 142 MMOL/L (ref 135–145)
SP GR UR STRIP: 1.02 (ref 1–1.03)
SQUAMOUS #/AREA URNS HPF: ABNORMAL /HPF
UROBILINOGEN UR QL STRIP: ABNORMAL
WBC NRBC COR # BLD: 4.7 10*3/MM3 (ref 4.8–10.8)
WBC UR QL AUTO: ABNORMAL /HPF

## 2018-01-03 PROCEDURE — 97530 THERAPEUTIC ACTIVITIES: CPT

## 2018-01-03 PROCEDURE — 80048 BASIC METABOLIC PNL TOTAL CA: CPT | Performed by: NURSE PRACTITIONER

## 2018-01-03 PROCEDURE — 97535 SELF CARE MNGMENT TRAINING: CPT | Performed by: OCCUPATIONAL THERAPIST

## 2018-01-03 PROCEDURE — 63710000001 INSULIN LISPRO (HUMAN) PER 5 UNITS: Performed by: INTERNAL MEDICINE

## 2018-01-03 PROCEDURE — 82962 GLUCOSE BLOOD TEST: CPT

## 2018-01-03 PROCEDURE — 81001 URINALYSIS AUTO W/SCOPE: CPT | Performed by: NURSE PRACTITIONER

## 2018-01-03 PROCEDURE — 85027 COMPLETE CBC AUTOMATED: CPT | Performed by: NURSE PRACTITIONER

## 2018-01-03 RX ORDER — POTASSIUM CHLORIDE 750 MG/1
40 CAPSULE, EXTENDED RELEASE ORAL 2 TIMES DAILY WITH MEALS
Status: COMPLETED | OUTPATIENT
Start: 2018-01-03 | End: 2018-01-03

## 2018-01-03 RX ORDER — SACCHAROMYCES BOULARDII 250 MG
250 CAPSULE ORAL 2 TIMES DAILY
Status: DISCONTINUED | OUTPATIENT
Start: 2018-01-03 | End: 2018-01-04 | Stop reason: HOSPADM

## 2018-01-03 RX ADMIN — GABAPENTIN 300 MG: 300 CAPSULE ORAL at 09:21

## 2018-01-03 RX ADMIN — INSULIN LISPRO 2 UNITS: 100 INJECTION, SOLUTION INTRAVENOUS; SUBCUTANEOUS at 17:33

## 2018-01-03 RX ADMIN — NYSTATIN: 100000 CREAM TOPICAL at 09:21

## 2018-01-03 RX ADMIN — Medication 250 MG: at 09:30

## 2018-01-03 RX ADMIN — ROSUVASTATIN CALCIUM 10 MG: 10 TABLET, FILM COATED ORAL at 09:20

## 2018-01-03 RX ADMIN — CHOLECALCIFEROL (VITAMIN D3) 25 MCG (1,000 UNIT) TABLET 5000 UNITS: TABLET at 09:20

## 2018-01-03 RX ADMIN — GLIPIZIDE 5 MG: 5 TABLET ORAL at 09:20

## 2018-01-03 RX ADMIN — POTASSIUM CHLORIDE 40 MEQ: 750 CAPSULE, EXTENDED RELEASE ORAL at 09:21

## 2018-01-03 RX ADMIN — FAMOTIDINE 40 MG: 20 TABLET ORAL at 09:20

## 2018-01-03 RX ADMIN — POTASSIUM CHLORIDE 20 MEQ: 1.5 POWDER, FOR SOLUTION ORAL at 09:20

## 2018-01-03 RX ADMIN — Medication 81 MG: at 09:20

## 2018-01-03 RX ADMIN — POTASSIUM CHLORIDE 40 MEQ: 750 CAPSULE, EXTENDED RELEASE ORAL at 11:57

## 2018-01-03 NOTE — PROGRESS NOTES
Continued Stay Note   [facility]     Patient Name: Marina Hyman  MRN: [id number]  Today's Date: 1/3/2018    Admit Date: 12/28/2017          Discharge Plan       01/03/18 1424    Case Management/Social Work Plan    Plan PT has been denied a bed offer by [facility]. PT's daughter is requesting PT to be listed with [facility] and [facility]. PT has been listed, will await bed offers or denials.     Patient/Family In Agreement With Plan yes              Discharge Codes     None            ELVIRA Montez

## 2018-01-03 NOTE — PLAN OF CARE
Problem: Patient Care Overview (Adult)  Goal: Plan of Care Review  Outcome: Ongoing (interventions implemented as appropriate)   01/03/18 0921   Coping/Psychosocial Response Interventions   Plan Of Care Reviewed With patient;daughter   Patient Care Overview   Progress progress toward functional goals as expected   Outcome Evaluation   Outcome Summary/Follow up Plan OT tx completed. Pt was largely non verbal this morning but lethargic and agreeable to therapy. No apparent distress noted. Pt was set up assist to wash face. Pt was mod A to wash hair with shower cap. Pt was min A for self feeding. Cont OT POC. Pt would benefit from SNF at d/c for additional therapy.

## 2018-01-03 NOTE — PLAN OF CARE
Problem: Patient Care Overview (Adult)  Goal: Plan of Care Review  Outcome: Ongoing (interventions implemented as appropriate)   01/03/18 0345   Coping/Psychosocial Response Interventions   Plan Of Care Reviewed With patient   Patient Care Overview   Progress no change   Outcome Evaluation   Outcome Summary/Follow up Plan Encourage PO intake. Refusing Telemetry. Incont. of B/B. Yolanda area excoriated. Medication applied. Turned and repo. every 2 hours. will cont. to monitor. Bed check on. Safety maintained       Problem: Renal Failure/Kidney Injury, Acute (Adult)  Goal: Signs and Symptoms of Listed Potential Problems Will be Absent or Manageable (Renal Failure/Kidney Injury, Acute)  Outcome: Ongoing (interventions implemented as appropriate)      Problem: Fall Risk (Adult)  Goal: Identify Related Risk Factors and Signs and Symptoms  Outcome: Outcome(s) achieved Date Met: 01/03/18    Goal: Absence of Falls  Outcome: Outcome(s) achieved Date Met: 01/03/18      Problem: Nutrition, Imbalanced: Inadequate Oral Intake (Adult)  Goal: Identify Related Risk Factors and Signs and Symptoms  Outcome: Outcome(s) achieved Date Met: 01/03/18    Goal: Improved Oral Intake  Outcome: Ongoing (interventions implemented as appropriate)    Goal: Prevent Further Weight Loss  Outcome: Ongoing (interventions implemented as appropriate)      Problem: Pressure Ulcer Risk (Prince Scale) (Adult,Obstetrics,Pediatric)  Goal: Identify Related Risk Factors and Signs and Symptoms  Outcome: Outcome(s) achieved Date Met: 01/03/18    Goal: Skin Integrity  Outcome: Ongoing (interventions implemented as appropriate)      Problem: Skin Integrity Impairment, Risk/Actual (Adult)  Goal: Identify Related Risk Factors and Signs and Symptoms  Outcome: Outcome(s) achieved Date Met: 01/03/18    Goal: Skin Integrity/Wound Healing  Outcome: Ongoing (interventions implemented as appropriate)      Problem: Infection, Risk/Actual (Adult)  Goal: Identify Related Risk  Factors and Signs and Symptoms  Outcome: Outcome(s) achieved Date Met: 01/03/18    Goal: Infection Prevention/Resolution  Outcome: Ongoing (interventions implemented as appropriate)      Problem: Cardiac: Heart Failure (Adult)  Goal: Signs and Symptoms of Listed Potential Problems Will be Absent or Manageable (Cardiac: Heart Failure)  Outcome: Ongoing (interventions implemented as appropriate)

## 2018-01-03 NOTE — THERAPY TREATMENT NOTE
Acute Care - Occupational Therapy Treatment Note  [facility]     Patient Name: Marina Hyman  : 1935  MRN: [id number]  Today's Date: 1/3/2018  Onset of Illness/Injury or Date of Surgery Date: 17  Date of Referral to OT: 17  Referring Physician: Dr Padgett      Admit Date: 2017    Visit Dx:     ICD-10-CM ICD-9-CM   1. ELIECER (acute kidney injury) N17.9 584.9   2. Dehydration E86.0 276.51   3. Hypoxemia R09.02 799.02   4. Altered mental status, unspecified altered mental status type R41.82 780.97   5. Weakness R53.1 780.79   6. Impaired mobility and ADLs Z74.09 799.89   7. Impaired functional mobility, balance, gait, and endurance Z74.09 V49.89     Patient Active Problem List   Diagnosis   • ELIECER (acute kidney injury)             Adult Rehabilitation Note       18 0842 18 1438 18 1125    Rehab Assessment/Intervention    Discipline occupational therapist  -MM physical therapy assistant  -JERRI occupational therapist  -MM    Document Type  therapy note (daily note)  -JERRI therapy note (daily note)  -MM    Subjective Information no complaints;agree to therapy   largely nonverbal throughout tx, no apparent distress  -MM agree to therapy;complains of;fatigue  -JERRI agree to therapy;complains of;fatigue  -MM    Precautions/Limitations fall precautions   Alzheimer's  -MM fall precautions   Alzheimer's  -JERRI fall precautions;other (see comments)   Alzheimer's  -MM    Specific Treatment Considerations   Pt has history of alzheimers, upon entering room pt has baby doll in lap trying to swaddle it.  -MM    Recorded by [MM] Amrik Miller, OTR/L [JERRI] Daysi Gandhi, PTA [MM] Amrik Miller, OTR/L    Pain Assessment    Pain Assessment No/denies pain  -MM No/denies pain  -JERRI No/denies pain  -MM    Recorded by [MM] Amrik Miller OTR/L [JERRI] Daysi Gandhi, PTA [MM] Amrik Miller, OTR/L    Bed Mobility, Assessment/Treatment    Bed Mob, Supine to Sit, [place]  verbal cues  required;contact guard assist  -JERRI     Bed Mob, Sit to Supine, [place]  minimum assist (75% patient effort)  -JERRI     Recorded by  [JERRI] Daysi Gandhi PTA     Transfer Assessment/Treatment    Transfer, Comment  Pt. will not stand.  -JERRI     Recorded by  [JERRI] Daysi Gandhi PTA     ADL Assessment/Intervention    Additional Documentation Self-Feeding Assessment/Training (Group)  -MM      Recorded by [MM] Amrik Miller OTR/L      Grooming Assessment/Training    Grooming Assess/Train, Position other (see comments)   fowlers  -MM      Grooming Assess/Train, Indepen Level set up required;moderate assist (50% patient effort)  -MM      Grooming Assess/Train, Impairments other (see comments)   decreased cognition  -MM      Grooming Assess/Train, Comment set up to wash face. mod A to wash hair with shower cap  -MM      Recorded by [MM] Amrik Miller OTR/L      Self-Feeding Assessment/Training    Self-Feeding Assess/Train, Position other (see comments)   fowlers, HOB raised  -MM      Self-Feeding Assess/Train, [place] minimum assist (75% patient effort);set up required  -MM      Self-Feeding Assess/Train, Spillage Amount minimal  -MM      Self-Feeding Assess/Train, Impairments other (see comments)   decreased cognition  -MM      Self-Feeding Assess/Train, Comment assitance with scooping, spoon used by OT to assist with scooping  -MM      Recorded by [MM] Amrik Miller OTR/L      Therapy Exercises    Bilateral Lower Extremities  AROM:;10 reps;sitting;LAQ  -JERRI     Bilateral Upper Extremity   AROM:;20 reps;hand pumps;elbow flexion/extension;shoulder extension/flexion   wrist flex/ext, fowlers, mod verbal cues and rest breaks  -MM    Recorded by  [JERRI] Daysi Gandhi PTA [MM] Amrik Miller OTR/L    Positioning and Restraints    Pre-Treatment Position in bed  -MM in bed  -JERRI in bed  -MM    Post Treatment Position bed  -MM bed  -JERRI bed  -MM    In Bed fowlers;call light within reach;encouraged to call  for assist;with family/caregiver;side rails up x3  -MM notified nsg;supine;side lying right;call light within reach;exit alarm on;with family/caregiver  -JERRI notified nsg;fowlers;call light within reach;encouraged to call for assist;with family/caregiver;side rails up x2  -MM    Recorded by [MM] Amrik Miller OTR/SCOTTIE [JERRI] Daysi Gandhi, PTA [MM] Amrik Miller OTR/L      User Key  (r) = Recorded By, (t) = Taken By, (c) = Cosigned By    Initials Name Effective Dates    JERRI Daysi Gandhi, PTA 08/02/16 -     MM Amrik Miller OTR/SCOTTIE 10/21/16 -                 OT Goals       12/31/17 0831          Transfer Training OT LTG    Transfer Training OT LTG, Date Established 12/31/17  -TR      Transfer Training OT LTG, Time to Achieve by discharge  -TR      Transfer Training OT LTG, Activity Type bed to chair /chair to bed;sit to stand/stand to sit;toilet  -TR      Transfer Training OT LTG, [place] Level contact guard assist  -TR      Transfer Training OT LTG, Assist Device walker, rolling  -TR      Transfer Training OT LTG, Additional Goal Using RW or rollator as needed.   -TR      Toileting OT LTG    Toileting Goal OT LTG, Date Established 12/31/17  -TR      Toileting Goal OT LTG, Time to Achieve by discharge  -TR      Toileting Goal OT LTG, [place] Level minimum assist (75% patient effort)  -TR      ADL OT LTG    ADL OT LTG, Date Established 12/31/17  -TR      ADL OT LTG, Time to Achieve by discharge  -TR      ADL OT LTG, Activity Type ADL skills  -TR      ADL OT LTG, [place] Level standby assist  -TR      ADL OT LTG, Additional Goal UB dressing and grooming tasks while seated.   -TR        User Key  (r) = Recorded By, (t) = Taken By, (c) = Cosigned By    Initials Name Provider Type    SANDRINE Lux OTR/L Occupational Therapist          Occupational Therapy Education     Title: PT OT SLP Therapies (Active)     Topic: Occupational Therapy (Active)     Point: ADL training (Active)     Description: Instruct learner(s) on proper safety adaptation and remediation techniques during self care or transfers.   Instruct in proper use of assistive devices.    Learning Progress Summary    Learner Readiness Method Response Comment Documented by Status   Patient Acceptance E NR benefits of activity  01/03/18 0921 Active    Acceptance D,E VU,NR Education provided on purpose of OT eval, impairments found, need for continued intervention and d/c planning. TR 12/31/17 0830 Done   Family Acceptance D,E VU,NR Education provided on purpose of OT eval, impairments found, need for continued intervention and d/c planning. TR 12/31/17 0830 Done               Point: Home exercise program (Active)    Description: Instruct learner(s) on appropriate technique for monitoring, assisting and/or progressing therapeutic exercises/activities.    Learning Progress Summary    Learner Readiness Method Response Comment Documented by Status   Patient Acceptance E NR verbally educated with demonstration on BUE HEP  01/02/18 1151 Active               Point: Precautions (Done)    Description: Instruct learner(s) on prescribed precautions during self-care and functional transfers.    Learning Progress Summary    Learner Readiness Method Response Comment Documented by Status   Patient Acceptance D,E VU,NR Education provided on purpose of OT eval, impairments found, need for continued intervention and d/c planning. TR 12/31/17 0830 Done   Family Acceptance D,E VU,NR Education provided on purpose of OT eval, impairments found, need for continued intervention and d/c planning. TR 12/31/17 0830 Done               Point: Body mechanics (Done)    Description: Instruct learner(s) on proper positioning and spine alignment during self-care, functional mobility activities and/or exercises.    Learning Progress Summary    Learner Readiness Method Response Comment Documented by Status   Patient Acceptance D,E VU,NR Education provided on purpose  of OT eval, impairments found, need for continued intervention and d/c planning. TR 12/31/17 0830 Done   Family Acceptance D,E KULWINDER,NR Education provided on purpose of OT eval, impairments found, need for continued intervention and d/c planning. TR 12/31/17 0830 Done                      User Key     Initials Effective Dates Name Provider Type Discipline    TR 06/22/15 -  Cindy Lux, OTR/L Occupational Therapist OT    MM 10/21/16 -  Amrik Miller, OTR/L Occupational Therapist OT                  OT Recommendation and Plan  Anticipated Equipment Needs At Discharge:  (None)  Anticipated Discharge Disposition: skilled nursing facility, assisted living, home with home health (Depending on progress.)  Planned Therapy Interventions: activity intolerance, ADL retraining, balance training, bed mobility training, energy conservation, home exercise program, ROM (Range of Motion), strengthening, transfer training  Therapy Frequency: 3-5 times/wk  Plan of Care Review  Plan Of Care Reviewed With: patient, daughter  Progress: progress toward functional goals as expected  Outcome Summary/Follow up Plan: OT tx completed. Pt was largely non verbal this morning but lethargic and agreeable to therapy. No apparent distress noted. Pt was set up assist to wash face. Pt was mod A to wash hair with shower cap. Pt was min A for self feeding. Cont OT POC. Pt would benefit from SNF at d/c for additional therapy.        Outcome Measures       01/03/18 0900 01/02/18 1100 01/01/18 0900    How much help from another person do you currently need...    Turning from your back to your side while in flat bed without using bedrails?   3  -TC    Moving from lying on back to sitting on the side of a flat bed without bedrails?   2  -TC    Moving to and from a bed to a chair (including a wheelchair)?   2  -TC    Standing up from a chair using your arms (e.g., wheelchair, bedside chair)?   2  -TC    Climbing 3-5 steps with a railing?   1  -TC    To  walk in hospital room?   2  -TC    AM-PAC 6 Clicks Score   12  -TC    How much help from another is currently needed...    Putting on and taking off regular lower body clothing? 2  -MM 2  -MM     Bathing (including washing, rinsing, and drying) 2  -MM 2  -MM     Toileting (which includes using toilet bed pan or urinal) 2  -MM 2  -MM     Putting on and taking off regular upper body clothing 2  -MM 2  -MM     Taking care of personal grooming (such as brushing teeth) 3  -MM 3  -MM     Eating meals 3  -MM 3  -MM     Score 14  -MM 14  -MM     Functional Assessment    Outcome Measure Options AM-PAC 6 Clicks Daily Activity (OT)  -MM AM-PAC 6 Clicks Daily Activity (OT)  -MM AM-PAC 6 Clicks Basic Mobility (PT)  -TC      User Key  (r) = Recorded By, (t) = Taken By, (c) = Cosigned By    Initials Name Provider Type    TC Abby Sharp, PT DPT Physical Therapist    MM Amrik Miller OTR/L Occupational Therapist           Time Calculation:         Time Calculation- OT       01/03/18 0923          Time Calculation- OT    OT Start Time 0842  -MM      OT Stop Time 0913  -MM      OT Time Calculation (min) 31 min  -MM      Total Timed Code Minutes- OT 31 minute(s)  -MM      OT Received On 01/03/18  -MM      OT Goal Re-Cert Due Date 01/10/18  -MM        User Key  (r) = Recorded By, (t) = Taken By, (c) = Cosigned By    Initials Name Provider Type    MM Amrik Miller OTR/L Occupational Therapist           Therapy Charges for Today     Code Description Service Date Service Provider Modifiers Qty    [id number]  OT THER PROC EA 15 MIN 1/2/2018 BENITEZ Eisenberg/L GO, KX 2    [id number]  OT SELF CARE/MGMT/TRAIN EA 15 MIN 1/3/2018 BENITEZ Eisenberg/L GO, KX 2          OT G-codes  OT Professional Judgement Used?: Yes  OT Functional Scales Options: AM-PAC 6 Clicks Daily Activity (OT)  Score: 14  Functional Limitation: Self care  Self Care Current Status (): At least 40 percent but less than 60 percent impaired,  limited or restricted  Self Care Goal Status (): At least 20 percent but less than 40 percent impaired, limited or restricted    Amrik Miller, OTR/L  1/3/2018

## 2018-01-03 NOTE — THERAPY TREATMENT NOTE
Acute Care - Occupational Therapy Treatment Note  [facility]     Patient Name: Marina Hyman  : 1935  MRN: [id number]  Today's Date: 1/3/2018  Onset of Illness/Injury or Date of Surgery Date: 17  Date of Referral to OT: 17  Referring Physician: Dr Padgett      Admit Date: 2017    Visit Dx:     ICD-10-CM ICD-9-CM   1. ELIECER (acute kidney injury) N17.9 584.9   2. Dehydration E86.0 276.51   3. Hypoxemia R09.02 799.02   4. Altered mental status, unspecified altered mental status type R41.82 780.97   5. Weakness R53.1 780.79   6. Impaired mobility and ADLs Z74.09 799.89   7. Impaired functional mobility, balance, gait, and endurance Z74.09 V49.89     Patient Active Problem List   Diagnosis   • ELIECER (acute kidney injury)             Adult Rehabilitation Note       18 1438 18 1125       Rehab Assessment/Intervention    Discipline physical therapy assistant  -JERRI occupational therapist  -MM     Document Type therapy note (daily note)  -JERRI therapy note (daily note)  -MM     Subjective Information agree to therapy;complains of;fatigue  -JERRI agree to therapy;complains of;fatigue  -MM     Precautions/Limitations fall precautions   Alzheimer's  -JERRI fall precautions;other (see comments)   Alzheimer's  -MM     Specific Treatment Considerations  Pt has history of alzheimers, upon entering room pt has baby doll in lap trying to swaddle it.  -MM     Recorded by [JERRI] Daysi Gandhi PTA [MM] Amrik Miller, OTR/L     Pain Assessment    Pain Assessment No/denies pain  -JERRI No/denies pain  -MM     Recorded by [JERRI] Daysi Gandhi PTA [MM] Amrik Miller, OTR/L     Bed Mobility, Assessment/Treatment    Bed Mob, Supine to Sit, [place] verbal cues required;contact guard assist  -JERRI      Bed Mob, Sit to Supine, [place] minimum assist (75% patient effort)  -JERRI      Recorded by [JERRI] Daysi Gandhi PTA      Transfer Assessment/Treatment    Transfer, Comment Pt. will not stand.  -JERRI       Recorded by [EJRRI] Daysi Gandhi PTA      Therapy Exercises    Bilateral Lower Extremities AROM:;10 reps;sitting;LAQ  -JERRI      Bilateral Upper Extremity  AROM:;20 reps;hand pumps;elbow flexion/extension;shoulder extension/flexion   wrist flex/ext, fowlers, mod verbal cues and rest breaks  -MM     Recorded by [JERRI] Daysi Gandhi PTA [MM] Amrik Miller OTR/L     Positioning and Restraints    Pre-Treatment Position in bed  -JERRI in bed  -MM     Post Treatment Position bed  -JERRI bed  -MM     In Bed notified nsg;supine;side lying right;call light within reach;exit alarm on;with family/caregiver  -JERRI notified nsg;fowlers;call light within reach;encouraged to call for assist;with family/caregiver;side rails up x2  -MM     Recorded by [JERRI] Daysi Gandhi PTA [MM] Amrik Miller OTR/L       User Key  (r) = Recorded By, (t) = Taken By, (c) = Cosigned By    Initials Name Effective Dates    JERRI Daysi Gandhi PTA 08/02/16 -     MM Amrik Miller OTR/SCOTTIE 10/21/16 -                 OT Goals       12/31/17 0831          Transfer Training OT LTG    Transfer Training OT LTG, Date Established 12/31/17  -TR      Transfer Training OT LTG, Time to Achieve by discharge  -TR      Transfer Training OT LTG, Activity Type bed to chair /chair to bed;sit to stand/stand to sit;toilet  -TR      Transfer Training OT LTG, [place] Level contact guard assist  -TR      Transfer Training OT LTG, Assist Device walker, rolling  -TR      Transfer Training OT LTG, Additional Goal Using RW or rollator as needed.   -TR      Toileting OT LTG    Toileting Goal OT LTG, Date Established 12/31/17  -TR      Toileting Goal OT LTG, Time to Achieve by discharge  -TR      Toileting Goal OT LTG, [place] Level minimum assist (75% patient effort)  -TR      ADL OT LTG    ADL OT LTG, Date Established 12/31/17  -TR      ADL OT LTG, Time to Achieve by discharge  -TR      ADL OT LTG, Activity Type ADL skills  -TR      ADL OT LTG, [place] Level  standby assist  -TR      ADL OT LTG, Additional Goal UB dressing and grooming tasks while seated.   -TR        User Key  (r) = Recorded By, (t) = Taken By, (c) = Cosigned By    Initials Name Provider Type    TR Cindy Lux, OTR/L Occupational Therapist          Occupational Therapy Education     Title: PT OT SLP Therapies (Active)     Topic: Occupational Therapy (Active)     Point: ADL training (Done)    Description: Instruct learner(s) on proper safety adaptation and remediation techniques during self care or transfers.   Instruct in proper use of assistive devices.    Learning Progress Summary    Learner Readiness Method Response Comment Documented by Status   Patient Acceptance D,E VU,NR Education provided on purpose of OT eval, impairments found, need for continued intervention and d/c planning. TR 12/31/17 0830 Done   Family Acceptance D,E VU,NR Education provided on purpose of OT eval, impairments found, need for continued intervention and d/c planning. TR 12/31/17 0830 Done               Point: Home exercise program (Active)    Description: Instruct learner(s) on appropriate technique for monitoring, assisting and/or progressing therapeutic exercises/activities.    Learning Progress Summary    Learner Readiness Method Response Comment Documented by Status   Patient Acceptance E NR verbally educated with demonstration on BUE HEP MM 01/02/18 1151 Active               Point: Precautions (Done)    Description: Instruct learner(s) on prescribed precautions during self-care and functional transfers.    Learning Progress Summary    Learner Readiness Method Response Comment Documented by Status   Patient Acceptance D,E VU,NR Education provided on purpose of OT eval, impairments found, need for continued intervention and d/c planning. TR 12/31/17 0830 Done   Family Acceptance D,E VU,NR Education provided on purpose of OT eval, impairments found, need for continued intervention and d/c planning. TR 12/31/17 0830  Done               Point: Body mechanics (Done)    Description: Instruct learner(s) on proper positioning and spine alignment during self-care, functional mobility activities and/or exercises.    Learning Progress Summary    Learner Readiness Method Response Comment Documented by Status   Patient Acceptance D,E KULWINDER,NR Education provided on purpose of OT eval, impairments found, need for continued intervention and d/c planning. TR 12/31/17 0830 Done   Family Acceptance D,E VU,NR Education provided on purpose of OT eval, impairments found, need for continued intervention and d/c planning. TR 12/31/17 0830 Done                      User Key     Initials Effective Dates Name Provider Type Discipline    TR 06/22/15 -  Cindy Lux, OTR/L Occupational Therapist OT     10/21/16 -  Amrik Millre OTR/L Occupational Therapist OT                  OT Recommendation and Plan  Anticipated Equipment Needs At Discharge:  (None)  Anticipated Discharge Disposition: skilled nursing facility, assisted living, home with home health (Depending on progress.)  Planned Therapy Interventions: activity intolerance, ADL retraining, balance training, bed mobility training, energy conservation, home exercise program, ROM (Range of Motion), strengthening, transfer training  Therapy Frequency: 3-5 times/wk  Plan of Care Review  Plan Of Care Reviewed With: patient  Progress: progress toward functional goals as expected  Outcome Summary/Follow up Plan: OT tx completed. Upon entering room, pt has babydoll in lap tending to it, trying to swaddle it. Pt c/o fatigue, but no c/o pain. Pt completed 20 reps of BUE ther ex with mod verbal cuing and rest breaks. Cont OT POC.        Outcome Measures       01/02/18 1100 01/01/18 0900       How much help from another person do you currently need...    Turning from your back to your side while in flat bed without using bedrails?  3  -TC     Moving from lying on back to sitting on the side of a flat bed  without bedrails?  2  -TC     Moving to and from a bed to a chair (including a wheelchair)?  2  -TC     Standing up from a chair using your arms (e.g., wheelchair, bedside chair)?  2  -TC     Climbing 3-5 steps with a railing?  1  -TC     To walk in hospital room?  2  -TC     AM-PAC 6 Clicks Score  12  -TC     How much help from another is currently needed...    Putting on and taking off regular lower body clothing? 2  -MM      Bathing (including washing, rinsing, and drying) 2  -MM      Toileting (which includes using toilet bed pan or urinal) 2  -MM      Putting on and taking off regular upper body clothing 2  -MM      Taking care of personal grooming (such as brushing teeth) 3  -MM      Eating meals 3  -MM      Score 14  -MM      Functional Assessment    Outcome Measure Options AM-PAC 6 Clicks Daily Activity (OT)  -MM AM-PAC 6 Clicks Basic Mobility (PT)  -TC       User Key  (r) = Recorded By, (t) = Taken By, (c) = Cosigned By    Initials Name Provider Type    TC Abby Sharp, PT DPT Physical Therapist    MM Amrik Miller OTR/L Occupational Therapist           Time Calculation:        Therapy Charges for Today     Code Description Service Date Service Provider Modifiers Qty    [id number] HC OT THER PROC EA 15 MIN 1/2/2018 BENITEZ Eisenberg/L GO, KFLACO 2          OT G-codes  OT Professional Judgement Used?: Yes  OT Functional Scales Options: AM-PAC 6 Clicks Daily Activity (OT)  Score: 14  Functional Limitation: Self care  Self Care Current Status (): At least 40 percent but less than 60 percent impaired, limited or restricted  Self Care Goal Status (): At least 20 percent but less than 40 percent impaired, limited or restricted    BENITEZ Foster/SCOTTIE  1/3/2018

## 2018-01-03 NOTE — PLAN OF CARE
Problem: Patient Care Overview (Adult)  Goal: Plan of Care Review  Outcome: Ongoing (interventions implemented as appropriate)   01/03/18 2306   Coping/Psychosocial Response Interventions   Plan Of Care Reviewed With patient   Patient Care Overview   Progress no change   Outcome Evaluation   Outcome Summary/Follow up Plan Patient hasn't C/O pain today. Turn Q hours. Up to bedside commode X assist and walker. Compression stockings changed. Nursing home placement pending. Continue to monitor overall condition and notify Md of any changes.        Problem: Renal Failure/Kidney Injury, Acute (Adult)  Goal: Signs and Symptoms of Listed Potential Problems Will be Absent or Manageable (Renal Failure/Kidney Injury, Acute)  Outcome: Ongoing (interventions implemented as appropriate)      Problem: Nutrition, Imbalanced: Inadequate Oral Intake (Adult)  Goal: Improved Oral Intake  Outcome: Ongoing (interventions implemented as appropriate)      Problem: Pressure Ulcer Risk (Prince Scale) (Adult,Obstetrics,Pediatric)  Goal: Skin Integrity  Outcome: Ongoing (interventions implemented as appropriate)      Problem: Skin Integrity Impairment, Risk/Actual (Adult)  Goal: Skin Integrity/Wound Healing  Outcome: Ongoing (interventions implemented as appropriate)      Problem: Infection, Risk/Actual (Adult)  Goal: Infection Prevention/Resolution  Outcome: Ongoing (interventions implemented as appropriate)      Problem: Cardiac: Heart Failure (Adult)  Goal: Signs and Symptoms of Listed Potential Problems Will be Absent or Manageable (Cardiac: Heart Failure)  Outcome: Ongoing (interventions implemented as appropriate)

## 2018-01-03 NOTE — PLAN OF CARE
Problem: Patient Care Overview (Adult)  Goal: Plan of Care Review  Outcome: Ongoing (interventions implemented as appropriate)   01/03/18 1038   Coping/Psychosocial Response Interventions   Plan Of Care Reviewed With patient   Patient Care Overview   Progress progress toward functional goals is gradual   Outcome Evaluation   Outcome Summary/Follow up Plan Pt. requires moderate asit for supine eo sit to supine. Fair sitting balance with occassional posteriore LOB. Performs LE ex's very slow;y. Stood x2 with max assist of one. Will benefit from continued strengthening, safety instruction with transfers and advancing activity as tolerates.

## 2018-01-04 VITALS
OXYGEN SATURATION: 100 % | BODY MASS INDEX: 22.74 KG/M2 | RESPIRATION RATE: 16 BRPM | HEART RATE: 83 BPM | DIASTOLIC BLOOD PRESSURE: 62 MMHG | HEIGHT: 62 IN | TEMPERATURE: 97.3 F | WEIGHT: 123.56 LBS | SYSTOLIC BLOOD PRESSURE: 128 MMHG

## 2018-01-04 LAB
ANION GAP SERPL CALCULATED.3IONS-SCNC: 12 MMOL/L (ref 4–13)
BUN BLD-MCNC: 9 MG/DL (ref 5–21)
BUN/CREAT SERPL: 13.4 (ref 7–25)
CALCIUM SPEC-SCNC: 10.1 MG/DL (ref 8.4–10.4)
CHLORIDE SERPL-SCNC: 108 MMOL/L (ref 98–110)
CO2 SERPL-SCNC: 23 MMOL/L (ref 24–31)
CREAT BLD-MCNC: 0.67 MG/DL (ref 0.5–1.4)
GFR SERPL CREATININE-BSD FRML MDRD: 85 ML/MIN/1.73
GLUCOSE BLD-MCNC: 113 MG/DL (ref 70–100)
GLUCOSE BLDC GLUCOMTR-MCNC: 112 MG/DL (ref 70–130)
GLUCOSE BLDC GLUCOMTR-MCNC: 114 MG/DL (ref 70–130)
POTASSIUM BLD-SCNC: 4.2 MMOL/L (ref 3.5–5.3)
SODIUM BLD-SCNC: 143 MMOL/L (ref 135–145)

## 2018-01-04 PROCEDURE — 97530 THERAPEUTIC ACTIVITIES: CPT

## 2018-01-04 PROCEDURE — 97110 THERAPEUTIC EXERCISES: CPT

## 2018-01-04 PROCEDURE — 97116 GAIT TRAINING THERAPY: CPT

## 2018-01-04 PROCEDURE — 80048 BASIC METABOLIC PNL TOTAL CA: CPT | Performed by: NURSE PRACTITIONER

## 2018-01-04 PROCEDURE — 82962 GLUCOSE BLOOD TEST: CPT

## 2018-01-04 RX ORDER — BISACODYL 5 MG/1
5 TABLET, DELAYED RELEASE ORAL DAILY PRN
Status: ON HOLD
Start: 2018-01-04 | End: 2018-05-17

## 2018-01-04 RX ORDER — GABAPENTIN 300 MG/1
300 CAPSULE ORAL
Qty: 6 CAPSULE | Refills: 0 | Status: SHIPPED | OUTPATIENT
Start: 2018-01-04 | End: 2018-05-18 | Stop reason: HOSPADM

## 2018-01-04 RX ORDER — ACETAMINOPHEN 325 MG/1
650 TABLET ORAL EVERY 4 HOURS PRN
Status: ON HOLD
Start: 2018-01-04 | End: 2018-05-17

## 2018-01-04 RX ORDER — OXYCODONE AND ACETAMINOPHEN 10; 325 MG/1; MG/1
1 TABLET ORAL EVERY 8 HOURS PRN
Qty: 9 TABLET | Refills: 0 | Status: SHIPPED | OUTPATIENT
Start: 2018-01-04 | End: 2018-05-18 | Stop reason: HOSPADM

## 2018-01-04 RX ORDER — NYSTATIN 100000 U/G
CREAM TOPICAL EVERY 12 HOURS SCHEDULED
Status: ON HOLD
Start: 2018-01-04 | End: 2018-05-17

## 2018-01-04 RX ADMIN — Medication 250 MG: at 08:27

## 2018-01-04 RX ADMIN — CHOLECALCIFEROL (VITAMIN D3) 25 MCG (1,000 UNIT) TABLET 5000 UNITS: TABLET at 08:27

## 2018-01-04 RX ADMIN — POTASSIUM CHLORIDE 20 MEQ: 1.5 POWDER, FOR SOLUTION ORAL at 08:26

## 2018-01-04 RX ADMIN — ROSUVASTATIN CALCIUM 10 MG: 10 TABLET, FILM COATED ORAL at 08:26

## 2018-01-04 RX ADMIN — GABAPENTIN 300 MG: 300 CAPSULE ORAL at 08:26

## 2018-01-04 RX ADMIN — Medication 81 MG: at 08:27

## 2018-01-04 RX ADMIN — GLIPIZIDE 5 MG: 5 TABLET ORAL at 08:27

## 2018-01-04 RX ADMIN — NYSTATIN: 100000 CREAM TOPICAL at 08:26

## 2018-01-04 RX ADMIN — FAMOTIDINE 40 MG: 20 TABLET ORAL at 08:27

## 2018-01-04 NOTE — PLAN OF CARE
Problem: Patient Care Overview (Adult)  Goal: Plan of Care Review  Outcome: Ongoing (interventions implemented as appropriate)   01/04/18 1028   Coping/Psychosocial Response Interventions   Plan Of Care Reviewed With patient   Patient Care Overview   Progress improving   Outcome Evaluation   Outcome Summary/Follow up Plan VSS, pt does not complain of pain, tolerated pills and breakfast well, does not conversate, does follow commands, will verbally respond to pain, daughter at the bedside and says this is baseline, pt nor daughter have any further questions or complaints at this time, will continue to monitor.      Goal: Adult Individualization and Mutuality  Outcome: Ongoing (interventions implemented as appropriate)    Goal: Discharge Needs Assessment  Outcome: Ongoing (interventions implemented as appropriate)      Problem: Renal Failure/Kidney Injury, Acute (Adult)  Goal: Signs and Symptoms of Listed Potential Problems Will be Absent or Manageable (Renal Failure/Kidney Injury, Acute)  Outcome: Ongoing (interventions implemented as appropriate)      Problem: Nutrition, Imbalanced: Inadequate Oral Intake (Adult)  Goal: Improved Oral Intake  Outcome: Ongoing (interventions implemented as appropriate)    Goal: Prevent Further Weight Loss  Outcome: Ongoing (interventions implemented as appropriate)      Problem: Pressure Ulcer Risk (Prince Scale) (Adult,Obstetrics,Pediatric)  Goal: Skin Integrity  Outcome: Ongoing (interventions implemented as appropriate)      Problem: Skin Integrity Impairment, Risk/Actual (Adult)  Goal: Skin Integrity/Wound Healing  Outcome: Ongoing (interventions implemented as appropriate)      Problem: Cardiac: Heart Failure (Adult)  Goal: Signs and Symptoms of Listed Potential Problems Will be Absent or Manageable (Cardiac: Heart Failure)  Outcome: Ongoing (interventions implemented as appropriate)

## 2018-01-04 NOTE — PLAN OF CARE
Problem: Patient Care Overview (Adult)  Goal: Plan of Care Review  Outcome: Ongoing (interventions implemented as appropriate)   01/04/18 1018   Coping/Psychosocial Response Interventions   Plan Of Care Reviewed With patient   Patient Care Overview   Progress progress towards functional goals is fair   Outcome Evaluation   Outcome Summary/Follow up Plan PT tx completed. Pt supine in bed, daughter present. Min/Mod bed mobility, Min/Mod to stand, and amb 40' x 2 with rollator Min A. Benefit from cont'd PT for strengthening program.

## 2018-01-04 NOTE — THERAPY TREATMENT NOTE
Acute Care - Physical Therapy Treatment Note  [facility]     Patient Name: Marina Hyman  : 1935  MRN: [id number]  Today's Date: 2018  Onset of Illness/Injury or Date of Surgery Date: 17  Date of Referral to PT: 17  Referring Physician: Dr Padgett    Admit Date: 2017    Visit Dx:    ICD-10-CM ICD-9-CM   1. ELIECER (acute kidney injury) N17.9 584.9   2. Dehydration E86.0 276.51   3. Hypoxemia R09.02 799.02   4. Altered mental status, unspecified altered mental status type R41.82 780.97   5. Weakness R53.1 780.79   6. Impaired mobility and ADLs Z74.09 799.89   7. Impaired functional mobility, balance, gait, and endurance Z74.09 V49.89     Patient Active Problem List   Diagnosis   • ELIECER (acute kidney injury)               Adult Rehabilitation Note       18 1018 18 1010 18 0842    Rehab Assessment/Intervention    Discipline physical therapy assistant  -KJ physical therapy assistant  -JERRI occupational therapist  -MM    Document Type therapy note (daily note)  -KJ therapy note (daily note)  -JERRI     Subjective Information agree to therapy  -KJ no complaints;agree to therapy   family also agrees  -JERRI no complaints;agree to therapy   largely nonverbal throughout tx, no apparent distress  -MM    Precautions/Limitations fall precautions   Alzheimers  -KJ fall precautions   Alzheimers  -JERRI fall precautions   Alzheimer's  -MM    Recorded by [KJ] Jenny Ramirez, PTA [JERRI] Daysi Gandhi, PTA [MM] Amrik Miller, OTR/L    Pain Assessment    Pain Assessment [place] FACES  -KJ No/denies pain  -JERRI No/denies pain  -MM    [place] FACES Pain Rating 0  -KJ      Recorded by [KJ] Jenny Ramirez PTA [JERRI] Daysi Gandhi, PTA [MM] Amrik Miller, OTR/L    Bed Mobility, Assessment/Treatment    Bed Mobility, Scoot/Bridge, [place]  maximum assist (25% patient effort)  -JERRI     Bed Mob, Supine to Sit, [place] verbal cues required;moderate assist (50% patient effort)  -KJ verbal  cues required;moderate assist (50% patient effort)  -JERRI     Bed Mob, Sit to Supine, [place] verbal cues required;minimum assist (75% patient effort)  -KJ verbal cues required;moderate assist (50% patient effort)  -JERRI     Recorded by [KJ] Jenny Ramirez, HERMINIO [JERRI] Daysi Gandhi, HERMINIO     Transfer Assessment/Treatment    Transfers, Sit-Stand [place] verbal cues required;moderate assist (50% patient effort);minimum assist (75% patient effort)  -KJ verbal cues required;moderate assist (50% patient effort)  -JERRI     Transfers, Stand-Sit [place] verbal cues required;minimum assist (75% patient effort)  -KJ verbal cues required;minimum assist (75% patient effort)  -JERRI     Transfers, Sit-Stand-Sit, Assist Device rolling walker  -KJ      Transfer, Comment  stood x2, 3 side steps on 2nd stand  -JERRI     Recorded by [KJ] Jenny Ramirez, HERMINIO [JERRI] Daysi Gandhi, PTA     Gait Assessment/Treatment    Gait, [place] Level verbal cues required;minimum assist (75% patient effort)  -KJ      Gait, Assistive Device rollator  -KJ      Gait, Distance (Feet) 80  -KJ      Gait, Gait Deviations bilateral:;ga decreased;forward flexed posture;limb motion velocity decreased;step length decreased  -KJ      Gait, Safety Issues loses balance backward;step length decreased;balance decreased during turns  -KJ      Gait, Impairments strength decreased;impaired balance  -KJ      Recorded by [KJ] Jenny Ramirez PTA      ADL Assessment/Intervention    Additional Documentation   Self-Feeding Assessment/Training (Group)  -MM    Recorded by   [MM] Amrik Miller, OTR/L    Grooming Assessment/Training    Grooming Assess/Train, Position   other (see comments)   fowlers  -MM    Grooming Assess/Train, Indepen Level   set up required;moderate assist (50% patient effort)  -MM    Grooming Assess/Train, Impairments   other (see comments)   decreased cognition  -MM    Grooming Assess/Train, Comment   set up to wash face. mod A to wash  hair with shower cap  -MM    Recorded by   [MM] Amrik Miller OTR/L    Self-Feeding Assessment/Training    Self-Feeding Assess/Train, Position   other (see comments)   fowlers, HOB raised  -MM    Self-Feeding Assess/Train, [place]   minimum assist (75% patient effort);set up required  -MM    Self-Feeding Assess/Train, Spillage Amount   minimal  -MM    Self-Feeding Assess/Train, Impairments   other (see comments)   decreased cognition  -MM    Self-Feeding Assess/Train, Comment   assitance with scooping, spoon used by OT to assist with scooping  -MM    Recorded by   [MM] Amrik Miller, OTR/L    Motor Skills/Interventions    Additional Documentation  Balance Skills Training (Group)  -JERRI     Recorded by  [JERRI] Daysi Gandhi PTA     Balance Skills Training    Sitting-Level of Assistance  Contact guard   occassional posterior lean  -JERRI     Sitting-Balance Activities  --   attempted reaching,not participating  -JERRI     Sitting # of Minutes  16  -JERRI     Recorded by  [JERRI] Daysi Gandhi PTA     Therapy Exercises    Bilateral Lower Extremities AROM:;15 reps;supine  -KJ AROM:;sitting;ankle pumps/circles;hip flexion;LAQ   all very slow and 2 sets of 10  -JERRI     Recorded by [KJ] Jenny Ramirez PTA [JERRI] Daysi Gandhi PTA     Positioning and Restraints    Pre-Treatment Position in bed  -KJ in bed  -JERRI in bed  -MM    Post Treatment Position bed  -KJ bed  -JERRI bed  -MM    In Bed  notified nsg;fowlers;call light within reach;encouraged to call for assist;exit alarm on;with family/caregiver  -JERRI fowlers;call light within reach;encouraged to call for assist;with family/caregiver;side rails up x3  -MM    Recorded by [KJ] Jenny Ramirez PTA [JERRI] Daysi Gandhi PTA [MM] Amrik Miller OTR/L      01/02/18 1438 01/02/18 1125       Rehab Assessment/Intervention    Discipline physical therapy assistant  -JERRI occupational therapist  -MM     Document Type therapy note (daily note)  -JERRI therapy note (daily note)  -MM      Subjective Information agree to therapy;complains of;fatigue  -JERRI agree to therapy;complains of;fatigue  -MM     Precautions/Limitations fall precautions   Alzheimer's  -JERRI fall precautions;other (see comments)   Alzheimer's  -MM     Specific Treatment Considerations  Pt has history of alzheimers, upon entering room pt has baby doll in lap trying to swaddle it.  -MM     Recorded by [JERRI] Daysi Gandhi PTA [MM] Amrik Miller OTR/L     Pain Assessment    Pain Assessment No/denies pain  -JERRI No/denies pain  -MM     Recorded by [JERRI] Daysi Gandhi PTA [MM] Amrik Miller OTR/L     Bed Mobility, Assessment/Treatment    Bed Mob, Supine to Sit, [place] verbal cues required;contact guard assist  -JERRI      Bed Mob, Sit to Supine, [place] minimum assist (75% patient effort)  -JERRI      Recorded by [JERRI] Daysi Gandhi PTA      Transfer Assessment/Treatment    Transfer, Comment Pt. will not stand.  -JERRI      Recorded by [JERRI] Daysi Gandhi PTA      Therapy Exercises    Bilateral Lower Extremities AROM:;10 reps;sitting;LAQ  -JERRI      Bilateral Upper Extremity  AROM:;20 reps;hand pumps;elbow flexion/extension;shoulder extension/flexion   wrist flex/ext, fowlers, mod verbal cues and rest breaks  -MM     Recorded by [JERRI] Daysi Gandhi PTA [MM] BENITEZ Eisenberg/L     Positioning and Restraints    Pre-Treatment Position in bed  - in bed  -     Post Treatment Position bed  - bed  -     In Bed notified nsg;supine;side lying right;call light within reach;exit alarm on;with family/caregiver  - notified nsg;fowlers;call light within reach;encouraged to call for assist;with family/caregiver;side rails up x2  -MM     Recorded by [JERRI] Daysi Gandhi PTA [MM] BENITEZ Eisenberg/L       User Key  (r) = Recorded By, (t) = Taken By, (c) = Cosigned By    Initials Name Effective Dates    GT Ramirez, HERMINIO 08/02/16 -     JERRI Daysi Gandhi PTA 08/02/16 -     MM Amrik Millre OTR/SCOTTIE  10/21/16 -                 IP PT Goals       01/01/18 0956          Bed Mobility PT LTG    Bed Mobility PT LTG, Date Established 01/01/18  -TC      Bed Mobility PT LTG, Activity Type all bed mobility  -TC      Bed Mobility PT LTG, [place] Level supervision required  -TC      Bed Mobility PT LTG, Additional Goal she has a hospital bed at home  -TC      Transfer Training PT LTG    Transfer Training PT LTG, Date Established 01/01/18  -TC      Transfer Training PT LTG, Time to Achieve by discharge  -TC      Transfer Training PT LTG, Activity Type bed to chair /chair to bed  -TC      Transfer Training PT LTG, [place] Level minimum assist (75% patient effort)  -TC      Transfer Training PT LTG, Assist Device walker, rolling   or rollator  -TC      Transfer Training 2 PT LTG    Transfer Training PT 2 LTG, Date Established 01/01/18  -TC      Transfer Training PT 2 LTG, Time to Achieve by discharge  -TC      Transfer Training PT 2 LTG, Activity Type sit to stand/stand to sit  -TC      Transfer Training PT 2 LTG, [place] Level contact guard assist  -TC      Transfer Training PT 2 LTG, Assist Device walker, rolling   or rollator  -TC      Gait Training PT LTG    Gait Training Goal PT LTG, Date Established 01/01/18  -TC      Gait Training Goal PT LTG, Time to Achieve by discharge  -TC      Gait Training Goal PT LTG, [place] Level minimum assist (75% patient effort)  -TC      Gait Training Goal PT LTG, Assist Device walker, rolling   or rollator  -TC      Gait Training Goal PT LTG, Distance to Achieve 30ft, with improved step length on the R  -TC      Strength Goal PT LTG    Strength Goal PT LTG, Date Established 01/01/18  -TC      Strength Goal PT LTG, Time to Achieve by discharge  -TC      Strength Goal PT LTG, Measure to Achieve 2x10 LE strengthening  -TC      Dynamic Sitting Balance PT LTG    Dynamic Sitting Balance PT LTG, Date Established 01/01/18  -TC      Dynamic Sitting Balance PT LTG, Time to  Achieve by discharge  -TC      Dynamic Sitting Balance PT LTG, [place] Level verbal cues required;contact guard assist  -TC      Static Standing Balance PT LTG    Static Standing Balance PT LTG, Date Established 01/01/18  -TC      Static Standing Balance PT LTG, Time to Achieve by discharge  -TC      Static Standing Balance PT LTG, [place] Level contact guard assist;minimum assist (75% patient effort)  -TC      Static Standing Balance PT LTG, Assist Device UE Support  -TC        User Key  (r) = Recorded By, (t) = Taken By, (c) = Cosigned By    Initials Name Provider Type    TC Abby Sharp, PT DPT Physical Therapist          Physical Therapy Education     Title: PT OT SLP Therapies (Active)     Topic: Physical Therapy (Active)     Point: Mobility training (Active)    Learning Progress Summary    Learner Readiness Method Response Comment Documented by Status   Patient Acceptance E NR benefitss of activitu, safety awareness, bed mobiity KJ 01/04/18 1034 Active    Acceptance E NR Bed mobility, importance of activity JERRI 01/03/18 1038 Active    Acceptance E NR Pt and dtr; POC, benefits of activity, posture, positioning in bed, pain TC 01/01/18 0955 Active   Family Acceptance E NR benefitss of activitu, safety awareness, bed mobiity KJ 01/04/18 1034 Active    Acceptance E NR Pt and dtr; POC, benefits of activity, posture, positioning in bed, pain TC 01/01/18 0955 Active               Point: Body mechanics (Active)    Learning Progress Summary    Learner Readiness Method Response Comment Documented by Status   Patient Acceptance E NR Pt and dtr; POC, benefits of activity, posture, positioning in bed, pain TC 01/01/18 0955 Active   Family Acceptance E NR Pt and dtr; POC, benefits of activity, posture, positioning in bed, pain TC 01/01/18 0955 Active               Point: Precautions (Active)    Learning Progress Summary    Learner Readiness Method Response Comment Documented by Status   Patient Acceptance E  NR Pt and dtr; POC, benefits of activity, posture, positioning in bed, pain  01/01/18 0955 Active   Family Acceptance E NR Pt and dtr; POC, benefits of activity, posture, positioning in bed, pain  01/01/18 0955 Active                      User Key     Initials Effective Dates Name Provider Type Discipline    KJ 08/02/16 -  Jenny Ramirez, PTA Physical Therapy Assistant PT    JERRI 08/02/16 -  Daysi Gandhi, PTA Physical Therapy Assistant PT    TC 06/01/17 -  Abby Sharp, PT DPT Physical Therapist PT                    PT Recommendation and Plan  Planned Therapy Interventions: balance training, manual therapy techniques, neuromuscular re-education, patient/family education, postural re-education, ROM (Range of Motion), strengthening, stretching, transfer training, bed mobility training  Plan of Care Review  Plan Of Care Reviewed With: patient  Progress: progress towards functional goals is fair  Outcome Summary/Follow up Plan: PT tx completed. Pt supine in bed, daughter present. Min/Mod bed mobility, Min/Mod to stand, and amb 40' x 2 with rollator Min A. Benefit from cont'd PT for strengthening program.          Outcome Measures       01/03/18 1000 01/03/18 0900 01/02/18 1100    How much help from another person do you currently need...    Turning from your back to your side while in flat bed without using bedrails? 2  -JERRI      Moving from lying on back to sitting on the side of a flat bed without bedrails? 2  -JERRI      Moving to and from a bed to a chair (including a wheelchair)? 2  -JERRI      Standing up from a chair using your arms (e.g., wheelchair, bedside chair)? 2  -JERRI      Climbing 3-5 steps with a railing? 1  -JERRI      To walk in hospital room? 2  -JERRI      AM-PAC 6 Clicks Score 11  -JERRI      How much help from another is currently needed...    Putting on and taking off regular lower body clothing?  2  -MM 2  -MM    Bathing (including washing, rinsing, and drying)  2  -MM 2  -MM    Toileting (which  includes using toilet bed pan or urinal)  2  -MM 2  -MM    Putting on and taking off regular upper body clothing  2  -MM 2  -MM    Taking care of personal grooming (such as brushing teeth)  3  -MM 3  -MM    Eating meals  3  -MM 3  -MM    Score  14  -MM 14  -MM    Functional Assessment    Outcome Measure Options  AM-PAC 6 Clicks Daily Activity (OT)  -MM AM-PAC 6 Clicks Daily Activity (OT)  -MM      User Key  (r) = Recorded By, (t) = Taken By, (c) = Cosigned By    Initials Name Provider Type    JERRI Gandhi, PTA Physical Therapy Assistant    MM Amrik Miller, OTR/L Occupational Therapist           Time Calculation:           PT G-Codes  Outcome Measure Options: AM-PAC 6 Clicks Daily Activity (OT)  Score: 12  Functional Limitation: Mobility: Walking and moving around  Mobility: Walking and Moving Around Current Status (): At least 60 percent but less than 80 percent impaired, limited or restricted  Mobility: Walking and Moving Around Goal Status (): At least 40 percent but less than 60 percent impaired, limited or restricted    Jenny Ramirez, PTA  1/4/2018

## 2018-01-04 NOTE — DISCHARGE SUMMARY
[facility] Medicine Services  DISCHARGE SUMMARY       Date of Admission: 12/28/2017  Date of Discharge:  1/4/2018  Primary Care Physician: Mel Abraham MD    Discharge Diagnoses:  Hospital Problem List     ELIECER (acute kidney injury)        Discharge diagnoses   1. Acute kidney injury-resolved  2. Urinary tract infection-no growth on cultures.   3. Acute metabolic encephalopathy secondary to above, improving  4. Non-insulin dependent Diabetes Mellitus Type 2, Hgb A1c 5.5.   5. Dementia  6. Diabetic neuropathy  7. Essential hypertension  8. Hyperlipidemia  9. Chronic back pain  10. Constipation, improving  11. Coronary artery disease  12. Hypothyroidism  13. Thrombocytopenia- appears chronic-much improved  14. Elevated D-Dimer- low evidence for probability of PE on VQ scan  15. Hypokalemia   Presenting Problem/History of Present Illness:  ELIECER (acute kidney injury) [N17.9]     Chief Complaint on Day of Discharge: No complaints  History of Present Illness on Day of Discharge:   Sitting up in bed eating breakfast.  Daughter present in room.  She tells me her name and identifies her daughter.  Otherwise she does not answer questions appropriately.  She tells me she does not hurt anywhere.  She denies nausea vomiting or abdominal pain.  She declined physical therapy yesterday.  Occupational therapy plans to sit on side of bed and exercise today.  Daughter tells me that she was ambulatory with walker prior to admission.    Consults: None    Procedures Performed: None    Pertinent Test Results:   Laboratory Data:      Results from last 7 days  Lab Units 01/03/18  0616 12/31/17  0620 12/30/17  0343   WBC 10*3/mm3 4.70* 4.46* 6.49   HEMOGLOBIN g/dL 13.5 14.0 12.2   HEMATOCRIT % 40.9 41.5 35.2*   PLATELETS 10*3/mm3 159 101* 89*          Results from last 7 days  Lab Units 01/04/18  0432 01/03/18  0616 12/31/17  0620  12/29/17  0621 12/28/17  1829   SODIUM mmol/L 143 142 144  < > 141 142    POTASSIUM mmol/L 4.2 2.9* 3.9  < > 5.0 4.5   CHLORIDE mmol/L 108 102 106  < > 105 100   CO2 mmol/L 23.0* 30.0 24.0  < > 22.0* 23.0*   BUN mg/dL 9 9 14  < > 24* 30*   CREATININE mg/dL 0.67 0.81 0.90  < > 1.33 1.82*   CALCIUM mg/dL 10.1 9.8 9.2  < > 8.7 9.0   BILIRUBIN mg/dL  --   --   --   --  0.5 0.7   ALK PHOS U/L  --   --   --   --  49 55   ALT (SGPT) U/L  --   --   --   --  37 39   AST (SGOT) U/L  --   --   --   --  35 42   GLUCOSE mg/dL 113* 133* 106*  < > 133* 118*   < > = values in this interval not displayed.  Urine, Clean Catch [[id number]] (Abnormal) Collected: 01/03/18 1627      Lab Status: Final result Specimen: Urine from Urine, Clean Catch Updated: 01/03/18 1637      Color, UA Yellow      Appearance, UA Clear      pH, UA 7.5      Specific Gravity, UA 1.017      Glucose,  mg/dL (2+) (A)      Ketones, UA Negative      Bilirubin, UA Negative      Blood, UA Negative      Protein, UA 30 mg/dL (1+) (A)      Leuk Esterase, UA Negative      Nitrite, UA Negative      Urobilinogen, UA 0.2 E.U./dL     Urinalysis, Microscopic Only - Urine, Clean Catch [[id number]] (Abnormal) Collected: 01/03/18 1627     Lab Status: Final result Specimen: Urine from Urine, Clean Catch Updated: 01/03/18 1637      RBC, UA 3-5 (A) /HPF       WBC, UA 0-2 (A) /HPF       Bacteria, UA None Seen /HPF       Squamous Epithelial Cells, UA 0-2 /HPF       Hyaline Casts, UA 0-2 /LPF       Methodology Automated Microscopy      Specimen: Blood Updated: 12/29/17 0712       Magnesium 2.2 mg/dL      Phosphorus [[id number]] (Normal) Collected: 12/29/17 0621     Lab Status: Final result Specimen: Blood Updated: 12/29/17 0712      Phosphorus 3.3 mg/dL      Urinalysis With / Culture If Indicated - Urine, Clean Catch [[id number]] (Abnormal) Collected: 12/28/17 1843     Lab Status: Final result Specimen: Urine from Urine, Clean Catch Updated: 12/28/17 1928      Color, UA Dark Yellow (A)      Appearance, UA Turbid (A)      pH, UA <=5.0      Specific  Gravity, UA 1.023      Glucose, UA Negative      Ketones, UA Trace (A)      Bilirubin, UA Negative      Blood, UA Large (3+) (A)      Protein, UA >=300 mg/dL (3+) (A)      Leuk Esterase, UA Negative      Nitrite, UA Negative      Urobilinogen, UA 1.0 E.U./dL     Urinalysis, Microscopic Only - Urine, Clean Catch [[id number]] (Abnormal) Collected: 12/28/17 1843     Lab Status: Final result Specimen: Urine from Urine, Clean Catch Updated: 12/28/17 1928      RBC, UA 0-2 (A) /HPF       WBC, UA 3-5 (A) /HPF       Bacteria, UA 4+ (A) /HPF       Squamous Epithelial Cells, UA 3-6 (A) /HPF       Hyaline Casts, UA None Seen /LPF       Granular Casts, UA 31-50 /LPF       Methodology Manual Light Microscopy     Urine Culture - Urine, Urine, Clean Catch [[id number]] (Normal) Collected: 12/28/17 1843     Lab Status: Final result Specimen: Urine from Urine, Clean Catch Updated: 12/30/17 0744      Urine Culture No growth at 2 days     Influenza Antigen, Rapid - Swab, Nasopharynx [[id number]] (Normal) Collected: 12/28/17 1833     Lab Status: Final result Specimen: Swab from Nasopharynx Updated: 12/28/17 1855      Influenza A Ag, EIA Negative      Influenza B Ag, EIA Negative      Left Brachial        Doug's Test N/A      pH, Arterial 7.421 pH units       pCO2, Arterial 30.9 (L) mm Hg       pO2, Arterial 65.5 (L) mm Hg       HCO3, Arterial 20.0 mmol/L       Base Excess, Arterial -3.3 (L) mmol/L       O2 Saturation, Arterial 93.8 (L) %       Temperature 37.0 C       Barometric Pressure for Blood Gas 762 mmHg       Modality Room Air      Ventilator Mode NA      Collected by [id number]      Specimen: Blood Updated: 12/28/17 1905       Protime 12.8 Seconds       INR 0.94     aPTT [[id number]] (Normal) Collected: 12/28/17 1829     Lab Status: Final result Specimen: Blood Updated: 12/28/17 1905      PTT 34.3 seconds      Lipase [[id number]] (Normal) Collected: 12/28/17 1829     Lab Status: Final result Specimen: Blood Updated: 12/28/17 1900       Lipase 32 U/L      Amylase [[id number]] (Normal) Collected: 12/28/17 1829     Lab Status: Final result Specimen: Blood Updated: 12/28/17 1900      Amylase 40 U/L      Troponin [[id number]] (Normal) Collected: 12/28/17 1829     Lab Status: Final result Specimen: Blood Updated: 12/28/17 1908      Troponin I 0.027 ng/mL      D-dimer, Quantitative [[id number]] (Abnormal) Collected: 12/28/17 1829     Lab Status: Final result Specimen: Blood Updated: 12/28/17 1905      D-Dimer, Quantitative 1.49 (H) mg/L (FEU)      Narrative:       Reference Range is 0-0.50 mg/L FEU. However, results <0.50 mg/L FEU tends to rule out DVT or PE. Results >0.50 mg/L FEU are not useful in predicting absence or presence of DVT or PE.     BNP [[id number]] (Normal) Collected: 12/28/17 1829     Lab Status: Final result Specimen: Blood Updated: 12/28/17 1908      proBNP 849.0 pg/mL      Lactic Acid, Plasma [[id number]] (Normal) Collected: 12/28/17 1829     Lab Status: Final result Specimen: Blood Updated: 12/1937      Lactate 1.7 mmol/L      C-reactive Protein [[id number]] (Abnormal) Collected: 12/28/17 1829     Lab Status: Final result Specimen: Blood Updated: 12/28/17 1900      C-Reactive Protein 3.68 (H) mg/dL       0.0 /100 WBC        Hemoglobin A1c [[id number]] Collected: 12/28/17 1829     Lab Status: Final result Specimen: Blood Updated: 12/29/17 0500      Hemoglobin A1C 5.5 %      Culture Data:   Blood Culture   Date Value Ref Range Status   12/28/2017 No growth at 5 days  Final   12/28/2017 No growth at 5 days  Final     Urine Culture   Date Value Ref Range Status   12/28/2017 No growth at 2 days  Final     Imaging Results (all)     Procedure Component Value Units Date/Time    XR Chest 1 View [[id number]] Collected:  12/28/17 1930     Updated:  12/28/17 1934    Narrative:       EXAMINATION: XR CHEST 1 VW-. 12/28/2017 7:30 PM CST     CHEST, ONE VIEW:     HISTORY: Chest pain     COMPARISON: 12/10/2017, 5/20/2016 and 8/15/2014     A single  frontal chest radiograph was obtained.     FINDINGS:     The level of inspiration is shallow and lung volumes diminished.     Median sternotomy and cardiac valve changes noted.     The lungs are clear without consolidating infiltrates.     The heart is generous without heart failure.     The bony structures are intact.                                     Impression:       1. No acute cardiopulmonary process.     This report was finalized on 12/28/2017 19:31 by Dr. Manjinder Snyder MD.    CT Head Without Contrast [[id number]] Collected:  12/28/17 2046     Updated:  12/28/17 2052    Narrative:       EXAMINATION: CT HEAD WO CONTRAST-  12/28/2017 8:46 PM CST     CT SCAN OF THE HEAD, WITHOUT CONTRAST:      HISTORY: Altered mental status     COMPARISONS: 04/20/2014      TECHNIQUE:     Radiation dose equals  mGy-cm.  Automated exposure control dose  reduction technique was implemented.        CT evaluation of the head without intravenous contrast. 5 mm transaxial  images were obtained.   2-D sagittal and coronal reconstruction images  were generated.     FINDINGS:      There is no intra or extra-axial hemorrhage.     There is no mass effect or midline shift. There is no hydrocephalus.     Central and cortical atrophy again observed. Moderate chronic ischemic  changes in the periventricular and subcortical white matter  again  observed. Old basal ganglia lacunar infarcts noted.     Paranasal sinuses imaged in part are grossly clear.     No acute calvarial abnormality observed.     The CT appearance the head is unchanged           Impression:       1. No CT evidence of an acute intracranial process.     2. Atrophy and chronic ischemic changes.           This report was finalized on 12/28/2017 20:49 by Dr. Manjinder Snyder MD.    NM Lung Ventilation Perfusion [[id number]] Collected:  12/28/17 2103     Updated:  12/28/17 2111    Narrative:       EXAMINATION: NM LUNG VENTILATION PERFUSION-  12/28/2017 9:03 PM CST     HISTORY:  Shortness of breath     COMPARISON:Current chest radiograph     TECHNIQUE:     5.5 mCi of technetium 99m labeled MAA was used obtained perfusion images  in multiple projections.     8.5 mCi of xenon-133 was used obtained in relation images and the  wash-in equilibrium and washout phases.     FINDINGS:     Homogeneous uptake of the perfusion and ventilation agents observed  without mismatch perfusion ventilation defects to indicate significant  probability for pulmonary embolus.     No significant air trapping observed.       Impression:       1. Low probability for pulmonary embolus.  This report was finalized on 12/28/2017 21:08 by Dr. Manjinder Snyder MD.    CT Abdomen Pelvis Without Contrast [[id number]] Collected:  12/28/17 2115     Updated:  12/28/17 2128    Narrative:       EXAMINATION: CT ABDOMEN PELVIS WO CONTRAST-  12/28/2017 9:15 PM CST     HISTORY: Abdominal pain and confusion     COMPARISON:05/06/2016     TECHNIQUE:  Radiation dose equals  mGy-cm.  Automated exposure  control dose reduction technique was implemented.     Thin section axial imaging was obtained. 2-D sagittal and coronal  reconstruction images were generated.     Intravenous contrast was not administered.     Oral contrast was not ingested.     FINDINGS:     Image quality degradation related to patient motion observed.     There are changes from aortic valve replacement. There are coronary  artery calcifications observed.     There are calcified hilar lymph nodes. Calcified granuloma appreciated  in the right middle and right lower lobe. There are patchy ground glass  opacities observed posterior in both lower lobes, suspect atelectasis  related to patient position in the level of inspiration.     The gallbladder is surgically absent. No focal hepatic lesions observed.     The spleen is not enlarged with calcified splenic granuloma observed.     There are no adrenal masses or discrete pancreatic abnormalities.     There are no urinary  tract calculi or evidence of obstruction. Urinary  bladder is minimally distended without focal bladder wall abnormality.     Hysterectomy changes observed. There are no adnexal masses.     A moderate amount of stool observed throughout the colon congestion  constipation. The colon is not dilated. The appendix is not clearly  identified. The no inflammatory changes indicate acute appendicitis.     Small bowel is not dilated. The duodenal sweep imaged appropriately. The  stomach is nondistended. Small hiatal hernia observed.     There is extensive atherosclerotic aortoiliofemoral calcifications.     There is no ascites or pneumoperitoneum.     The no pathologically enlarged lymph nodes.     There is advanced degenerative changes in the thoracolumbar spine  scoliosis. There is multiple chronic appearing compression fractures  with vertebral plana at L2 and T12.       Impression:       1. No CT evidence of an acute intra-abdominal/pelvic pathological  process.  2. Nonacute findings, described above in detail.  This report was finalized on 12/28/2017 21:25 by Dr. Manjinder Snyder MD.    XR Chest 1 View [[id number]] Collected:  12/30/17 2139     Updated:  12/30/17 2200    Narrative:       EXAMINATION: XR CHEST 1 VW-. 12/30/2017 9:39 PM CST     CHEST, ONE VIEW:     HISTORY: Concern for fluid overload     COMPARISON: 12/28/2017, 12/10/2017 and 5/20/2016     A single frontal chest radiograph was obtained.     FINDINGS:     The level inspiration is shallow and lung volumes diminished.     Changes from median sternotomy and cardiac valve replacement observed.     The heart is generous with changes from median sternotomy. The perihilar  interstitial markings are mildly prominent particularly compared to the  2016 examination and mild volume overload not excluded.     There are no parenchymal infiltrates or radiographic evidence of overt  heart failure.     The bony structures are intact with diffuse spondylosis changes.                                    Impression:       1. Generous heart size. Mild perihilar interstitial prominence, mild  pulmonary venous hypertension not excluded. No parenchymal infiltrates  or radiographic evidence of overt heart failure.     This report was finalized on 12/30/2017 21:57 by Dr. Manjinder Snyder MD.        Hospital Course  Patient is a 82 y.o. female presented to [facility] emergency room 12/28/17 with her daughter with complaints of confusion, shortness of breath, abdominal pain, and  chest pain.  Daughter reported that the day prior around 1300 patient was talking about things that happened in the past.  She has history of dementia but according to daughter seemed more confused than normal.  Patient had refused shower at assisted living, refused to eat, and was not as talkative or responding as normal.  Patient's daughter deny any recent falls or head injury.  Patient denied headache or dizziness.  Patient reported shortness of breath and intermittent chest pain as well as left lower quadrant back pain.  She was a poor historian and was not able to give accurate information.  She has history of L2 compression fracture and is followed by pain management.  She had a recent injection 12/19/17.  No fever since recent injection.  She was recently treated for urinary tract infection on 12/10/17.  She completed antibiotics.  Daughter reported increased weakness and difficulty with ambulation.  Creatinine 1.82, d-dimer 1.49, CRP 3.68, white blood cell 10.36, PO2 65 on room air.  Flu A negative.  Flu B negative.  Urinalysis large blood, 4+ bacteria, 3-5 WBCs, negative nitrates.  Chest x-ray no acute cardiopulmonary process.  Nuclear med lung ventilation scan low probability for pulmonary embolus.  CT abdomen no acute intra-abdominal pathology.  CT head no CT evidence of acute intracranial process.  She received normal saline fluid bolus in the emergency room.    She was admitted to the telemetry floor  under the hospitalist service with acute kidney injury, mental status changes likely secondary to dehydration and urinary tract infection and constipation.  She was started on Rocephin IV and eventually converted to Omnicef and  completed course of treatment for urinary tract infection.  Urine culture was negative.  Initial urinalysis showed 4+ bacteria, large blood, 3-4 WBCs.  Urine culture no growth.  Repeat urinalysis 1/3/18 negative bacteria, negative nitrate.  She presented with creatinine 1.8.  She was hydrated with IV fluids.  Creatinine normalized and by date of discharge training 0.6.  TEDs were placed for deep vein thrombosis prophylaxis.  Initially, she was started on Lovenox but was discontinued due to thrombocytopenia.  Due to constipation she was placed on bowel regimen of Dulcolax suppository every other day and Colace.  Eventually, constipation resolved.  D-dimer elevated with low evidence of probability pulmonary emboli on VQ scan.    Physical therapy and occupational therapy were consulted.  Patient was reluctant to participate with care.  Daughter indicated that she was able to ambulate with walker prior to discharge.  She has history of dementia.  Daughter felt as though confusion was work.  Discharge planning discussed with daughter that she would need skilled nursing facility as she needs assistance with ambulation and continues to have confusion.  However, she does identify herself and daughter correctly.  She had been placed on Seroquel upon admission.  Seroquel was discontinued and patient was more awake and talkative    She has hypertension which has remained stable.  She has diabetes mellitus type II.  A1c 5.5.  Metformin not resumed upon admission as creatinine 1.8. Metformin was resumed at discharge.  She has chronic pain syndrome followed by pain management.  She has thrombocytopenia.  Platelets 89 on admission but improved 159 by discharge.     consulted for discharge  "planning.  She was denied a bed offered by [facility] but has been offered a bed at [facility]. On 1/4/18 she is suitable for dischgarge for ongoing rehabilitation prior to discharge home.  Dr. Puga would like patient to have outpatient follow-up with urology due to frequent urinary tract infections.    Physical Exam on Discharge:  /73 (BP Location: Right arm, Patient Position: Lying)  Pulse 81  Temp 97.6 °F (36.4 °C) (Oral)   Resp 18  Ht 157.5 cm (62\")  Wt 56 kg (123 lb 9 oz)  SpO2 97%  BMI 22.6 kg/m2  Physical Exam   Constitutional: She appears well-developed and well-nourished.   HENT:   Head: Normocephalic and atraumatic.   Eyes: EOM are normal. Pupils are equal, round, and reactive to light.   Neck: Normal range of motion. Neck supple. No tracheal deviation present. No thyromegaly present.   Cardiovascular: Normal rate, regular rhythm, normal heart sounds and intact distal pulses.  Exam reveals no gallop and no friction rub.    No murmur heard.  Normal sinus rhythm 80s on telemetry   Pulmonary/Chest: Effort normal and breath sounds normal. No respiratory distress. She has no wheezes. She has no rales.   No oxygen in use   Abdominal: Soft. Bowel sounds are normal. She exhibits no distension. There is no tenderness.   Genitourinary:   Genitourinary Comments: Deferred.  Denies dysuria.   Musculoskeletal: She exhibits no edema.   TEDs bilateral lower extremities   Neurological:   Identifies herself and daughter correctly.  Otherwise does not answer questions appropriately.  Follows commands.  Moves extremities equally.   Skin: Skin is warm and dry. No erythema.     Condition on Discharge: Stable    Discharge Disposition: [facility] rehab    Discharge Diet:   Diet Instructions     Diet: Consistent Carbohydrate       Discharge Diet:  Consistent Carbohydrate              diabetic diet    Activity at Discharge:   Activity Instructions     Activity as Tolerated                  progressive activity as " tolerated.  Use rolling walker.    Discharge Care Plan / Instructions:   1.  Physical therapy and occupational twice daily.  Progressive activity as tolerated. Use rolling walker.  2.  Recommend urology follow-up 2-3 weeks due to frequent urinary tract infections.    Discharge Medications:   BoogieMarina   Home Medication Instructions SHYANN:[id number]    Printed on:01/04/18 3004   Medication Information                      acetaminophen (TYLENOL) 325 MG tablet  Take 2 tablets by mouth Every 4 (Four) Hours As Needed for Mild Pain .             alendronate (FOSAMAX) 70 MG tablet  Take 70 mg by mouth Every 7 (Seven) Days. Monday             aspirin 81 MG EC tablet  Take 81 mg by mouth Daily.             bisacodyl (DULCOLAX) 5 MG EC tablet  Take 1 tablet by mouth Daily As Needed for Constipation.             donepezil (ARICEPT) 10 MG tablet  Take 10 mg by mouth Every Night.             furosemide (LASIX) 40 MG tablet  Take 40 mg by mouth Daily.             gabapentin (NEURONTIN) 300 MG capsule  Take 1 capsule by mouth 2 (Two) Times a Day.             insulin lispro (humaLOG) 100 UNIT/ML injection  Inject 2-7 Units under the skin 4 (Four) Times a Day With Meals & at Bedtime.  150-199 2 units, 200-249 3 units, 250-299 4 units,  300-349 5 units, 350-400 6 units, greater than 407 units and call provider             metFORMIN ER (GLUCOPHAGE-XR) 500 MG 24 hr tablet  Take 500 mg by mouth Every Evening.             nystatin (MYCOSTATIN) 604880 UNIT/GM cream  Apply  topically Every 12 (Twelve) Hours.             oxyCODONE-acetaminophen (PERCOCET)  MG per tablet  Take 1 tablet by mouth Every 8 (Eight) Hours As Needed for Moderate Pain .             potassium chloride (KLOR-CON) 20 MEQ packet  Take 20 mEq by mouth Daily.             rosuvastatin (CRESTOR) 20 MG tablet  Take 20 mg by mouth Daily.             vitamin D (ERGOCALCIFEROL) 19117 units capsule capsule  Take 50,000 Units by mouth 1 (One) Time Per Week.  Monday.               Follow-up Appointments:   Follow-up Information     Follow up with Mel Abraham MD .    Specialty:  Internal Medicine    Why:  1 week - APPOINTMENT ON JANUARY 11, 2018 AT 01:00 PM WITH NURSE PRACTITIONER    Contact information:    [address] [place] [address] [address]  [facility] [address]  [phone]          Follow up with Jose Rosenbaum MD .    Specialty:  Urology    Why:  APPT 2-3 WEEKS FOR FREQUENT UTI'S - APPOINTMENT ON JANUARY 23, 2018 AT 10:40 AM    Contact information:    [address]  [facility] [address]  [phone]          Test Results Pending at Discharge: none    The above documentation resulted from a face-to-face encounter by me Chikis HOWE, [facility].    CARLEY Brooks  01/04/18  10:48 AM    Time:  This discharge process required 45 minutes for completion.     Plan discussed with Dr. Puga, patient, and daughter.    Time spent in face-to-face evaluation, chart review, planning and education 45 minutes.  Please note that portions of this note may have been completed with a voice recognition program. Efforts were made to edit the dictations, but occasionally words are mistranscribed.

## 2018-01-04 NOTE — PROGRESS NOTES
Continued Stay Note   [place]     Patient Name: Marina Hyman  MRN: [id number]  Today's Date: 1/4/2018    Admit Date: 12/28/2017          Discharge Plan       01/04/18 0953    Case Management/Social Work Plan    Plan PT has been accepted to [facility] and may dc to SNF when medically ready. [phone] phone [phone] fax.     Patient/Family In Agreement With Plan yes              Discharge Codes     None            ELVIRA Montez

## 2018-01-04 NOTE — PLAN OF CARE
Problem: Patient Care Overview (Adult)  Goal: Plan of Care Review  Outcome: Ongoing (interventions implemented as appropriate)   01/04/18 0412   Coping/Psychosocial Response Interventions   Plan Of Care Reviewed With patient   Patient Care Overview   Progress progress toward functional goals as expected   Outcome Evaluation   Outcome Summary/Follow up Plan Patient has not c/o pain. Up to bedside commode, daughter bedside to assist. D/c to nursing home. Daughter refused evening meds due to patient sleeping. No c/o SOA. No new pressure ulcer issues, turns self well. Bed alarm on until daughter wanted turned off and stated she would get up with patient. continue to monitor.      Goal: Adult Individualization and Mutuality  Outcome: Ongoing (interventions implemented as appropriate)    Goal: Discharge Needs Assessment  Outcome: Ongoing (interventions implemented as appropriate)      Problem: Renal Failure/Kidney Injury, Acute (Adult)  Goal: Signs and Symptoms of Listed Potential Problems Will be Absent or Manageable (Renal Failure/Kidney Injury, Acute)  Outcome: Ongoing (interventions implemented as appropriate)      Problem: Nutrition, Imbalanced: Inadequate Oral Intake (Adult)  Goal: Improved Oral Intake  Outcome: Ongoing (interventions implemented as appropriate)    Goal: Prevent Further Weight Loss  Outcome: Ongoing (interventions implemented as appropriate)      Problem: Pressure Ulcer Risk (Prince Scale) (Adult,Obstetrics,Pediatric)  Goal: Skin Integrity  Outcome: Ongoing (interventions implemented as appropriate)      Problem: Skin Integrity Impairment, Risk/Actual (Adult)  Goal: Skin Integrity/Wound Healing  Outcome: Ongoing (interventions implemented as appropriate)      Problem: Cardiac: Heart Failure (Adult)  Goal: Signs and Symptoms of Listed Potential Problems Will be Absent or Manageable (Cardiac: Heart Failure)  Outcome: Ongoing (interventions implemented as appropriate)

## 2018-01-04 NOTE — PLAN OF CARE
Problem: Patient Care Overview (Adult)  Goal: Plan of Care Review  Outcome: Ongoing (interventions implemented as appropriate)   01/04/18 1108   Coping/Psychosocial Response Interventions   Plan Of Care Reviewed With patient;family   Patient Care Overview   Progress progress toward functional goals is gradual   Outcome Evaluation   Outcome Summary/Follow up Plan Pt. performed ue exs with constant tactile/vc's from this thomas/l!

## 2018-01-05 NOTE — PLAN OF CARE
Problem: Inpatient Physical Therapy  Goal: Bed Mobility Goal LTG- PT  Outcome: Unable to achieve outcome(s) by discharge Date Met: 01/05/18 01/01/18 0956 01/05/18 1309   Bed Mobility PT LTG   Bed Mobility PT LTG, Date Established 01/01/18 --    Bed Mobility PT LTG, Activity Type all bed mobility --    Bed Mobility PT LTG, [place] Level supervision required --    Bed Mobility PT LTG, Additional Goal she has a hospital bed at home --    Bed Mobility PT LTG, Date Goal Reviewed --  01/05/18   Bed Mobility PT LTG, Outcome --  goal not met   Bed Mobility PT LTG, Reason Goal Not Met --  discharged from facility     Goal: Transfer Training Goal 1 LTG- PT  Outcome: Unable to achieve outcome(s) by discharge Date Met: 01/05/18 01/01/18 0956 01/05/18 1309   Transfer Training PT LTG   Transfer Training PT LTG, Date Established 01/01/18 --    Transfer Training PT LTG, Time to Achieve by discharge --    Transfer Training PT LTG, Activity Type bed to chair /chair to bed --    Transfer Training PT LTG, [place] Level minimum assist (75% patient effort) --    Transfer Training PT LTG, Assist Device walker, rolling  (or rollator) --    Transfer Training PT LTG, Date Goal Reviewed --  01/05/18   Transfer Training PT LTG, Outcome --  goal not met   Transfer Training PT LTG, Reason Goal Not Met --  discharged from facility     Goal: Transfer Training Goal 2 LTG- PT  Outcome: Unable to achieve outcome(s) by discharge Date Met: 01/05/18 01/01/18 0956 01/05/18 1309   Transfer Training 2 PT LTG   Transfer Training PT 2 LTG, Date Established 01/01/18 --    Transfer Training PT 2 LTG, Time to Achieve by discharge --    Transfer Training PT 2 LTG, Activity Type sit to stand/stand to sit --    Transfer Training PT 2 LTG, [place] Level contact guard assist --    Transfer Training PT 2 LTG, Assist Device walker, rolling  (or rollator) --    Transfer Training PT 2 LTG, Date Goal Reviewed --  01/05/18   Transfer Training PT 2  LTG, Outcome --  goal not met   Transfer Training PT 2 LTG, Reason Goal Not Met --  discharged from facility     Goal: Gait Training Goal LTG- PT  Outcome: Outcome(s) achieved Date Met: 01/05/18 01/01/18 0956 01/05/18 1309   Gait Training PT LTG   Gait Training Goal PT LTG, Date Established 01/01/18 --    Gait Training Goal PT LTG, Time to Achieve by discharge --    Gait Training Goal PT LTG, [place] Level minimum assist (75% patient effort) --    Gait Training Goal PT LTG, Assist Device walker, rolling  (or rollator) --    Gait Training Goal PT LTG, Distance to Achieve 30ft, with improved step length on the R --    Gait Training Goal PT LTG, Date Goal Reviewed --  01/05/18   Gait Training Goal PT LTG, Outcome --  goal met     Goal: Strength Goal LTG- PT  Outcome: Unable to achieve outcome(s) by discharge Date Met: 01/05/18 01/01/18 0956 01/05/18 1309   Strength Goal PT LTG   Strength Goal PT LTG, Date Established 01/01/18 --    Strength Goal PT LTG, Time to Achieve by discharge --    Strength Goal PT LTG, Measure to Achieve 2x10 LE strengthening --    Strength Goal PT LTG, Date Goal Reviewed --  01/05/18   Strength Goal PT LTG, Outcome --  goal not met   Strength Goal PT LTG, Reason Goal Not Met --  discharged from facility     Goal: Dynamic Sitting Balance Goal LTG- PT  Outcome: Unable to achieve outcome(s) by discharge Date Met: 01/05/18 01/01/18 0956 01/05/18 1309   Dynamic Sitting Balance PT LTG   Dynamic Sitting Balance PT LTG, Date Established 01/01/18 --    Dynamic Sitting Balance PT LTG, Time to Achieve by discharge --    Dynamic Sitting Balance PT LTG, [place] Level verbal cues required;contact guard assist --    Dynamic Sitting Balance PT LTG, Date Goal Reviewed --  01/05/18   Dynamic Sitting Balance PT LTG, Outcome --  goal not met   Dynamic Sitting Balance PT LTG, Reason Goal Not Met --  discharged from facility     Goal: Static Standing Balance Goal LTG- PT  Outcome: Unable to achieve  outcome(s) by discharge Date Met: 01/05/18 01/01/18 0956 01/05/18 1309   Static Standing Balance PT LTG   Static Standing Balance PT LTG, Date Established 01/01/18 --    Static Standing Balance PT LTG, Time to Achieve by discharge --    Static Standing Balance PT LTG, [place] Level contact guard assist;minimum assist (75% patient effort) --    Static Standing Balance PT LTG, Assist Device UE Support --    Static Standing Balance PT LTG, Date Goal Reviewed --  01/05/18   Static Standing Balance PT LTG, Outcome --  goal not met   Static Standing Balance PT LTG, Reason Goal Not Met --  discharged from facility

## 2018-01-05 NOTE — THERAPY DISCHARGE NOTE
Acute Care - Physical Therapy Discharge Summary  [facility]       Patient Name: Marina Hyman  : 1935  MRN: [id number]    Today's Date: 2018  Onset of Illness/Injury or Date of Surgery Date: 17    Date of Referral to PT: 17  Referring Physician: Dr Padgett      Admit Date: 2017      PT Recommendation and Plan    Visit Dx:    ICD-10-CM ICD-9-CM   1. ELIECER (acute kidney injury) N17.9 584.9   2. Dehydration E86.0 276.51   3. Hypoxemia R09.02 799.02   4. Altered mental status, unspecified altered mental status type R41.82 780.97   5. Weakness R53.1 780.79   6. Impaired mobility and ADLs Z74.09 799.89   7. Impaired functional mobility, balance, gait, and endurance Z74.09 V49.89             Outcome Measures       18 0902 18 1000 18 0900    How much help from another person do you currently need...    Turning from your back to your side while in flat bed without using bedrails?  2  -JERRI     Moving from lying on back to sitting on the side of a flat bed without bedrails?  2  -JERRI     Moving to and from a bed to a chair (including a wheelchair)?  2  -JERRI     Standing up from a chair using your arms (e.g., wheelchair, bedside chair)?  2  -JERRI     Climbing 3-5 steps with a railing?  1  -JERRI     To walk in hospital room?  2  -JERRI     AM-PAC 6 Clicks Score  11  -JERRI     How much help from another is currently needed...    Putting on and taking off regular lower body clothing? 2  -CJ  2  -MM    Bathing (including washing, rinsing, and drying) 2  -CJ  2  -MM    Toileting (which includes using toilet bed pan or urinal) 2  -CJ  2  -MM    Putting on and taking off regular upper body clothing 2  -CJ  2  -MM    Taking care of personal grooming (such as brushing teeth) 3  -CJ  3  -MM    Eating meals 3  -CJ  3  -MM    Score 14  -CJ  14  -MM    Functional Assessment    Outcome Measure Options AM-PAC 6 Clicks Daily Activity (OT)  -CJ  AM-PAC 6 Clicks Daily Activity (OT)  -MM      User Key  (r) =  Recorded By, (t) = Taken By, (c) = Cosigned By    Initials Name Provider Type    NELLA Snyder, MEJIAS/L Occupational Therapy Assistant    JERRI Gandhi, PTA Physical Therapy Assistant    MM Amrik Miller, OTR/L Occupational Therapist                      IP PT Goals       01/05/18 1309 01/01/18 0956       Bed Mobility PT LTG    Bed Mobility PT LTG, Date Established  01/01/18  -TC     Bed Mobility PT LTG, Activity Type  all bed mobility  -TC     Bed Mobility PT LTG, [place] Level  supervision required  -TC     Bed Mobility PT LTG, Additional Goal  she has a hospital bed at home  -TC     Bed Mobility PT LTG, Date Goal Reviewed 01/05/18  -NW      Bed Mobility PT LTG, Outcome goal not met  -NW      Bed Mobility PT LTG, Reason Goal Not Met discharged from facility  -NW      Transfer Training PT LTG    Transfer Training PT LTG, Date Established  01/01/18  -TC     Transfer Training PT LTG, Time to Achieve  by discharge  -TC     Transfer Training PT LTG, Activity Type  bed to chair /chair to bed  -TC     Transfer Training PT LTG, [place] Level  minimum assist (75% patient effort)  -TC     Transfer Training PT LTG, Assist Device  walker, rolling   or rollator  -TC     Transfer Training PT  LTG, Date Goal Reviewed 01/05/18  -NW      Transfer Training PT LTG, Outcome goal not met  -NW      Transfer Training PT LTG, Reason Goal Not Met discharged from facility  -NW      Transfer Training 2 PT LTG    Transfer Training PT 2 LTG, Date Established  01/01/18  -TC     Transfer Training PT 2 LTG, Time to Achieve  by discharge  -TC     Transfer Training PT 2 LTG, Activity Type  sit to stand/stand to sit  -TC     Transfer Training PT 2 LTG, [place] Level  contact guard assist  -TC     Transfer Training PT 2 LTG, Assist Device  walker, rolling   or rollator  -TC     Transfer Training PT 2 LTG, Date Goal Reviewed 01/05/18  -NW      Transfer Training PT 2 LTG, Outcome goal not met  -NW      Transfer Training  PT 2 LTG, Reason Goal Not Met discharged from facility  -NW      Gait Training PT LTG    Gait Training Goal PT LTG, Date Established  01/01/18  -     Gait Training Goal PT LTG, Time to Achieve  by discharge  -TC     Gait Training Goal PT LTG, [place] Level  minimum assist (75% patient effort)  -TC     Gait Training Goal PT LTG, Assist Device  walker, rolling   or rollator  -TC     Gait Training Goal PT LTG, Distance to Achieve  30ft, with improved step length on the R  -TC     Gait Training Goal PT LTG, Date Goal Reviewed 01/05/18  -NW      Gait Training Goal PT LTG, Outcome goal met  -NW      Strength Goal PT LTG    Strength Goal PT LTG, Date Established  01/01/18  -TC     Strength Goal PT LTG, Time to Achieve  by discharge  -TC     Strength Goal PT LTG, Measure to Achieve  2x10 LE strengthening  -     Strength Goal PT LTG, Date Goal Reviewed 01/05/18  -NW      Strength Goal PT LTG, Outcome goal not met  -NW      Strength Goal PT LTG, Reason Goal Not Met discharged from facility  -NW      Dynamic Sitting Balance PT LTG    Dynamic Sitting Balance PT LTG, Date Established  01/01/18  -     Dynamic Sitting Balance PT LTG, Time to Achieve  by discharge  -TC     Dynamic Sitting Balance PT LTG, [place] Level  verbal cues required;contact guard assist  -     Dynamic Sitting Balance PT LTG, Date Goal Reviewed 01/05/18  -NW      Dynamic Sitting Balance PT LTG, Outcome goal not met  -NW      Dynamic Sitting Balance PT LTG, Reason Goal Not Met discharged from facility  -NW      Static Standing Balance PT LTG    Static Standing Balance PT LTG, Date Established  01/01/18  -     Static Standing Balance PT LTG, Time to Achieve  by discharge  -TC     Static Standing Balance PT LTG, [place] Level  contact guard assist;minimum assist (75% patient effort)  -     Static Standing Balance PT LTG, Assist Device  UE Support  -     Static Standing Balance PT LTG, Date Goal Reviewed 01/05/18  -NW      Static  Standing Balance PT LTG, Outcome goal not met  -NW      Static Standing Balance PT LTG, Reason Goal Not Met discharged from facility  -NW        User Key  (r) = Recorded By, (t) = Taken By, (c) = Cosigned By    Initials Name Provider Type    NW Amber Padilla PTA Physical Therapy Assistant    BRITTANY Sharp, PT DPT Physical Therapist              PT Discharge Summary  Anticipated Discharge Disposition: skilled nursing facility  Reason for Discharge: Discharge from facility  Outcomes Achieved: Refer to plan of care for updates on goals achieved  Discharge Destination: SNF      Amber Padilla PTA   1/5/2018

## 2018-01-05 NOTE — THERAPY DISCHARGE NOTE
Acute Care - Occupational Therapy Discharge Summary  [facility]     Patient Name: Marina Hyman  : 1935  MRN: [id number]    Today's Date: 2018  Onset of Illness/Injury or Date of Surgery Date: 17    Date of Referral to OT: 17  Referring Physician: Dr Padgett      Admit Date: 2017        OT Recommendation and Plan    Visit Dx:    ICD-10-CM ICD-9-CM   1. ELIECER (acute kidney injury) N17.9 584.9   2. Dehydration E86.0 276.51   3. Hypoxemia R09.02 799.02   4. Altered mental status, unspecified altered mental status type R41.82 780.97   5. Weakness R53.1 780.79   6. Impaired mobility and ADLs Z74.09 799.89   7. Impaired functional mobility, balance, gait, and endurance Z74.09 V49.89                     OT Goals       18 0626 17 0831       Transfer Training OT LTG    Transfer Training OT LTG, Date Established  17  -TR     Transfer Training OT LTG, Time to Achieve  by discharge  -TR     Transfer Training OT LTG, Activity Type  bed to chair /chair to bed;sit to stand/stand to sit;toilet  -TR     Transfer Training OT LTG, [place] Level  contact guard assist  -TR     Transfer Training OT LTG, Assist Device  walker, rolling  -TR     Transfer Training OT LTG, Additional Goal  Using RW or rollator as needed.   -TR     Transfer Training OT LTG, Date Goal Reviewed 18  -      Transfer Training OT LTG, Outcome goal not met  -      Transfer Training OT LTG, Reason Goal Not Met discharged from facility  -      Toileting OT LTG    Toileting Goal OT LTG, Date Established  17  -TR     Toileting Goal OT LTG, Time to Achieve  by discharge  -TR     Toileting Goal OT LTG, [place] Level  minimum assist (75% patient effort)  -TR     Toileting Goal OT LTG, Date Goal Reviewed 18  -      Toileting Goal OT LTG, Outcome goal not met  -      Toileting Goal OT LTG, Reason Goal Not Met discharged from [facility]  -      ADL OT LTG    ADL OT LTG, Date Established   12/31/17  -TR     ADL OT LTG, Time to Achieve  by discharge  -TR     ADL OT LTG, Activity Type  ADL skills  -TR     ADL OT LTG, [place] Level  standby assist  -TR     ADL OT LTG, Additional Goal  UB dressing and grooming tasks while seated.   -TR     ADL OT LTG, Date Goal Reviewed 01/05/18  -      ADL OT LTG, Outcome goal not met  -      ADL OT LTG, Reason Goal Not Met discharged from facility  -        User Key  (r) = Recorded By, (t) = Taken By, (c) = Cosigned By    Initials Name Provider Type     Jose Snyder MEJIAS/L Occupational Therapy Assistant    SANDRINE Lux, OTR/L Occupational Therapist                Outcome Measures       01/04/18 0902 01/03/18 1000 01/03/18 0900    How much help from another person do you currently need...    Turning from your back to your side while in flat bed without using bedrails?  2  -JERRI     Moving from lying on back to sitting on the side of a flat bed without bedrails?  2  -JERRI     Moving to and from a bed to a chair (including a wheelchair)?  2  -JERRI     Standing up from a chair using your arms (e.g., wheelchair, bedside chair)?  2  -JERRI     Climbing 3-5 steps with a railing?  1  -JERRI     To walk in hospital room?  2  -JERRI     AM-PAC 6 Clicks Score  11  -JERRI     How much help from another is currently needed...    Putting on and taking off regular lower body clothing? 2  -  2  -MM    Bathing (including washing, rinsing, and drying) 2  -  2  -MM    Toileting (which includes using toilet bed pan or urinal) 2  -  2  -MM    Putting on and taking off regular upper body clothing 2  -  2  -MM    Taking care of personal grooming (such as brushing teeth) 3  -  3  -MM    Eating meals 3  -  3  -MM    Score 14  -  14  -MM    Functional Assessment    Outcome Measure Options AM-PAC 6 Clicks Daily Activity (OT)  -  AM-PAC 6 Clicks Daily Activity (OT)  -MM      01/02/18 1100          How much help from another is currently needed...    Putting on and taking  off regular lower body clothing? 2  -MM      Bathing (including washing, rinsing, and drying) 2  -MM      Toileting (which includes using toilet bed pan or urinal) 2  -MM      Putting on and taking off regular upper body clothing 2  -MM      Taking care of personal grooming (such as brushing teeth) 3  -MM      Eating meals 3  -MM      Score 14  -MM      Functional Assessment    Outcome Measure Options AM-PAC 6 Clicks Daily Activity (OT)  -MM        User Key  (r) = Recorded By, (t) = Taken By, (c) = Cosigned By    Initials Name Provider Type    CJ Jose Snyder MEJIAS/L Occupational Therapy Assistant    JERRI Gnadhi, PTA Physical Therapy Assistant    MM Amrik Miller, OTR/L Occupational Therapist          Therapy Charges for Today     Code Description Service Date Service Provider Modifiers Qty    93835127194 HC OT THER PROC EA 15 MIN 1/4/2018 RANDELL Govea/L GO, KX 2          OT Discharge Summary  Reason for Discharge: Discharge from facility  Outcomes Achieved: Refer to plan of care for updates on goals achieved  Discharge Destination: SNF      RAQUEL Pulido  1/5/2018

## 2018-01-05 NOTE — PLAN OF CARE
Problem: Inpatient Occupational Therapy  Goal: Transfer Training Goal 1 LTG- OT  Outcome: Unable to achieve outcome(s) by discharge Date Met: 01/05/18 12/31/17 0831 01/05/18 0626   Transfer Training OT LTG   Transfer Training OT LTG, Date Established 12/31/17 --    Transfer Training OT LTG, Time to Achieve by discharge --    Transfer Training OT LTG, Activity Type bed to chair /chair to bed;sit to stand/stand to sit;toilet --    Transfer Training OT LTG, [place] Level contact guard assist --    Transfer Training OT LTG, Assist Device walker, rolling --    Transfer Training OT LTG, Additional Goal Using RW or rollator as needed.  --    Transfer Training OT LTG, Date Goal Reviewed --  01/05/18   Transfer Training OT LTG, Outcome --  goal not met   Transfer Training OT LTG, Reason Goal Not Met --  discharged from facility     Goal: Toileting Goal LTG- OT  Outcome: Unable to achieve outcome(s) by discharge Date Met: 01/05/18 12/31/17 0831 01/05/18 0626   Toileting OT LTG   Toileting Goal OT LTG, Date Established 12/31/17 --    Toileting Goal OT LTG, Time to Achieve by discharge --    Toileting Goal OT LTG, [place] Level minimum assist (75% patient effort) --    Toileting Goal OT LTG, Date Goal Reviewed --  01/05/18   Toileting Goal OT LTG, Outcome --  goal not met   Toileting Goal OT LTG, Reason Goal Not Met --  discharged from facility     Goal: ADL Goal LTG- OT  Outcome: Unable to achieve outcome(s) by discharge Date Met: 01/05/18 12/31/17 0831 01/05/18 0626   ADL OT LTG   ADL OT LTG, Date Established 12/31/17 --    ADL OT LTG, Time to Achieve by discharge --    ADL OT LTG, Activity Type ADL skills --    ADL OT LTG, [place] Level standby assist --    ADL OT LTG, Additional Goal UB dressing and grooming tasks while seated.  --    ADL OT LTG, Date Goal Reviewed --  01/05/18   ADL OT LTG, Outcome --  goal not met   ADL OT LTG, Reason Goal Not Met --  discharged from facility

## 2018-01-11 ENCOUNTER — LAB REQUISITION (OUTPATIENT)
Dept: LAB | Facility: HOSPITAL | Age: 83
End: 2018-01-11

## 2018-01-11 DIAGNOSIS — Z00.00 ENCOUNTER FOR GENERAL ADULT MEDICAL EXAMINATION WITHOUT ABNORMAL FINDINGS: ICD-10-CM

## 2018-01-11 LAB
25(OH)D3 SERPL-MCNC: 51.6 NG/ML (ref 30–100)
ALBUMIN SERPL-MCNC: 3.4 G/DL (ref 3.5–5)
ALBUMIN/GLOB SERPL: 1.4 G/DL (ref 1.1–2.5)
ALP SERPL-CCNC: 65 U/L (ref 24–120)
ALT SERPL W P-5'-P-CCNC: 44 U/L (ref 0–54)
ANION GAP SERPL CALCULATED.3IONS-SCNC: 12 MMOL/L (ref 4–13)
ARTICHOKE IGE QN: 73 MG/DL (ref 0–99)
AST SERPL-CCNC: 23 U/L (ref 7–45)
BASOPHILS # BLD AUTO: 0.01 10*3/MM3 (ref 0–0.2)
BASOPHILS NFR BLD AUTO: 0.2 % (ref 0–2)
BILIRUB SERPL-MCNC: 0.5 MG/DL (ref 0.1–1)
BUN BLD-MCNC: 13 MG/DL (ref 5–21)
BUN/CREAT SERPL: 17.8 (ref 7–25)
CALCIUM SPEC-SCNC: 8.5 MG/DL (ref 8.4–10.4)
CHLORIDE SERPL-SCNC: 95 MMOL/L (ref 98–110)
CHOLEST SERPL-MCNC: 115 MG/DL (ref 130–200)
CO2 SERPL-SCNC: 30 MMOL/L (ref 24–31)
CREAT BLD-MCNC: 0.73 MG/DL (ref 0.5–1.4)
DEPRECATED RDW RBC AUTO: 41.9 FL (ref 40–54)
EOSINOPHIL # BLD AUTO: 0.02 10*3/MM3 (ref 0–0.7)
EOSINOPHIL NFR BLD AUTO: 0.5 % (ref 0–4)
ERYTHROCYTE [DISTWIDTH] IN BLOOD BY AUTOMATED COUNT: 13.8 % (ref 12–15)
GFR SERPL CREATININE-BSD FRML MDRD: 77 ML/MIN/1.73
GLOBULIN UR ELPH-MCNC: 2.4 GM/DL
GLUCOSE BLD-MCNC: 114 MG/DL (ref 70–100)
HBA1C MFR BLD: 5.8 %
HCT VFR BLD AUTO: 38.6 % (ref 37–47)
HDLC SERPL-MCNC: 43 MG/DL
HGB BLD-MCNC: 13.4 G/DL (ref 12–16)
IMM GRANULOCYTES # BLD: 0.03 10*3/MM3 (ref 0–0.03)
IMM GRANULOCYTES NFR BLD: 0.7 % (ref 0–5)
LDLC/HDLC SERPL: 1.2 {RATIO}
LYMPHOCYTES # BLD AUTO: 0.37 10*3/MM3 (ref 0.72–4.86)
LYMPHOCYTES NFR BLD AUTO: 8.5 % (ref 15–45)
MAGNESIUM SERPL-MCNC: 2.4 MG/DL (ref 1.4–2.2)
MCH RBC QN AUTO: 29.3 PG (ref 28–32)
MCHC RBC AUTO-ENTMCNC: 34.7 G/DL (ref 33–36)
MCV RBC AUTO: 84.3 FL (ref 82–98)
MONOCYTES # BLD AUTO: 0.4 10*3/MM3 (ref 0.19–1.3)
MONOCYTES NFR BLD AUTO: 9.2 % (ref 4–12)
NEUTROPHILS # BLD AUTO: 3.51 10*3/MM3 (ref 1.87–8.4)
NEUTROPHILS NFR BLD AUTO: 80.9 % (ref 39–78)
NRBC BLD MANUAL-RTO: 0 /100 WBC (ref 0–0)
PLATELET # BLD AUTO: 171 10*3/MM3 (ref 130–400)
PMV BLD AUTO: 11 FL (ref 6–12)
POTASSIUM BLD-SCNC: 3 MMOL/L (ref 3.5–5.3)
PREALB SERPL-MCNC: 21.4 MG/DL (ref 18–36)
PROT SERPL-MCNC: 5.8 G/DL (ref 6.3–8.7)
RBC # BLD AUTO: 4.58 10*6/MM3 (ref 4.2–5.4)
SODIUM BLD-SCNC: 137 MMOL/L (ref 135–145)
T4 FREE SERPL-MCNC: 1.89 NG/DL (ref 0.78–2.19)
TRIGL SERPL-MCNC: 102 MG/DL (ref 0–149)
TSH SERPL DL<=0.05 MIU/L-ACNC: 2.04 MIU/ML (ref 0.47–4.68)
VIT B12 BLD-MCNC: 196 PG/ML (ref 239–931)
WBC NRBC COR # BLD: 4.34 10*3/MM3 (ref 4.8–10.8)

## 2018-01-11 PROCEDURE — 85025 COMPLETE CBC W/AUTO DIFF WBC: CPT | Performed by: INTERNAL MEDICINE

## 2018-01-11 PROCEDURE — 80061 LIPID PANEL: CPT | Performed by: INTERNAL MEDICINE

## 2018-01-11 PROCEDURE — 84439 ASSAY OF FREE THYROXINE: CPT | Performed by: INTERNAL MEDICINE

## 2018-01-11 PROCEDURE — 80053 COMPREHEN METABOLIC PANEL: CPT | Performed by: INTERNAL MEDICINE

## 2018-01-11 PROCEDURE — 36415 COLL VENOUS BLD VENIPUNCTURE: CPT | Performed by: INTERNAL MEDICINE

## 2018-01-11 PROCEDURE — 84443 ASSAY THYROID STIM HORMONE: CPT | Performed by: INTERNAL MEDICINE

## 2018-01-11 PROCEDURE — 82607 VITAMIN B-12: CPT | Performed by: INTERNAL MEDICINE

## 2018-01-11 PROCEDURE — 82306 VITAMIN D 25 HYDROXY: CPT | Performed by: INTERNAL MEDICINE

## 2018-01-11 PROCEDURE — 84134 ASSAY OF PREALBUMIN: CPT | Performed by: INTERNAL MEDICINE

## 2018-01-11 PROCEDURE — 83735 ASSAY OF MAGNESIUM: CPT | Performed by: INTERNAL MEDICINE

## 2018-01-11 PROCEDURE — 83036 HEMOGLOBIN GLYCOSYLATED A1C: CPT | Performed by: INTERNAL MEDICINE

## 2018-01-19 ENCOUNTER — LAB REQUISITION (OUTPATIENT)
Dept: LAB | Facility: HOSPITAL | Age: 83
End: 2018-01-19

## 2018-01-19 DIAGNOSIS — Z00.00 ENCOUNTER FOR GENERAL ADULT MEDICAL EXAMINATION WITHOUT ABNORMAL FINDINGS: ICD-10-CM

## 2018-01-19 LAB
DEPRECATED RDW RBC AUTO: 55.3 FL (ref 40–54)
EOSINOPHIL # BLD MANUAL: 0.83 10*3/MM3 (ref 0–0.7)
EOSINOPHIL NFR BLD MANUAL: 6 % (ref 0–4)
ERYTHROCYTE [DISTWIDTH] IN BLOOD BY AUTOMATED COUNT: 16.3 % (ref 12–15)
HCT VFR BLD AUTO: 30.9 % (ref 37–47)
HGB BLD-MCNC: 10.2 G/DL (ref 12–16)
LYMPHOCYTES # BLD MANUAL: 1.53 10*3/MM3 (ref 0.72–4.86)
LYMPHOCYTES NFR BLD MANUAL: 11 % (ref 15–45)
LYMPHOCYTES NFR BLD MANUAL: 5 % (ref 4–12)
MCH RBC QN AUTO: 30.9 PG (ref 28–32)
MCHC RBC AUTO-ENTMCNC: 33 G/DL (ref 33–36)
MCV RBC AUTO: 93.6 FL (ref 82–98)
MONOCYTES # BLD AUTO: 0.7 10*3/MM3 (ref 0.19–1.3)
NEUTROPHILS # BLD AUTO: 10.71 10*3/MM3 (ref 1.87–8.4)
NEUTROPHILS NFR BLD MANUAL: 77 % (ref 39–78)
PLATELET # BLD AUTO: 556 10*3/MM3 (ref 130–400)
PMV BLD AUTO: 10.4 FL (ref 6–12)
POIKILOCYTOSIS BLD QL SMEAR: ABNORMAL
RBC # BLD AUTO: 3.3 10*6/MM3 (ref 4.2–5.4)
SCAN SLIDE: NORMAL
SMALL PLATELETS BLD QL SMEAR: ABNORMAL
VARIANT LYMPHS NFR BLD MANUAL: 1 % (ref 0–5)
WBC MORPH BLD: NORMAL
WBC NRBC COR # BLD: 13.91 10*3/MM3 (ref 4.8–10.8)

## 2018-01-19 PROCEDURE — 85025 COMPLETE CBC W/AUTO DIFF WBC: CPT | Performed by: NURSE PRACTITIONER

## 2018-01-19 PROCEDURE — 36415 COLL VENOUS BLD VENIPUNCTURE: CPT | Performed by: NURSE PRACTITIONER

## 2018-01-19 PROCEDURE — 85007 BL SMEAR W/DIFF WBC COUNT: CPT | Performed by: NURSE PRACTITIONER

## 2018-01-25 ENCOUNTER — TELEPHONE (OUTPATIENT)
Dept: INTERNAL MEDICINE | Age: 83
End: 2018-01-25

## 2018-01-25 DIAGNOSIS — N17.9 ACUTE KIDNEY INJURY (HCC): Primary | ICD-10-CM

## 2018-02-08 ENCOUNTER — TELEPHONE (OUTPATIENT)
Dept: INTERNAL MEDICINE | Age: 83
End: 2018-02-08

## 2018-02-14 ENCOUNTER — LAB REQUISITION (OUTPATIENT)
Dept: LAB | Facility: HOSPITAL | Age: 83
End: 2018-02-14

## 2018-02-14 DIAGNOSIS — Z00.00 ENCOUNTER FOR GENERAL ADULT MEDICAL EXAMINATION WITHOUT ABNORMAL FINDINGS: ICD-10-CM

## 2018-02-14 LAB
ANION GAP SERPL CALCULATED.3IONS-SCNC: 12 MMOL/L (ref 4–13)
BASOPHILS # BLD AUTO: 0.02 10*3/MM3 (ref 0–0.2)
BASOPHILS NFR BLD AUTO: 0.6 % (ref 0–2)
BUN BLD-MCNC: 14 MG/DL (ref 5–21)
BUN/CREAT SERPL: 18.9 (ref 7–25)
CALCIUM SPEC-SCNC: 8.9 MG/DL (ref 8.4–10.4)
CHLORIDE SERPL-SCNC: 101 MMOL/L (ref 98–110)
CO2 SERPL-SCNC: 28 MMOL/L (ref 24–31)
CREAT BLD-MCNC: 0.74 MG/DL (ref 0.5–1.4)
DEPRECATED RDW RBC AUTO: 44.2 FL (ref 40–54)
EOSINOPHIL # BLD AUTO: 0.17 10*3/MM3 (ref 0–0.7)
EOSINOPHIL NFR BLD AUTO: 5.2 % (ref 0–4)
ERYTHROCYTE [DISTWIDTH] IN BLOOD BY AUTOMATED COUNT: 14 % (ref 12–15)
GFR SERPL CREATININE-BSD FRML MDRD: 76 ML/MIN/1.73
GLUCOSE BLD-MCNC: 92 MG/DL (ref 70–100)
HCT VFR BLD AUTO: 38.2 % (ref 37–47)
HGB BLD-MCNC: 12.8 G/DL (ref 12–16)
IMM GRANULOCYTES # BLD: 0.01 10*3/MM3 (ref 0–0.03)
IMM GRANULOCYTES NFR BLD: 0.3 % (ref 0–5)
LYMPHOCYTES # BLD AUTO: 0.62 10*3/MM3 (ref 0.72–4.86)
LYMPHOCYTES NFR BLD AUTO: 18.8 % (ref 15–45)
MAGNESIUM SERPL-MCNC: 2.3 MG/DL (ref 1.4–2.2)
MCH RBC QN AUTO: 29 PG (ref 28–32)
MCHC RBC AUTO-ENTMCNC: 33.5 G/DL (ref 33–36)
MCV RBC AUTO: 86.4 FL (ref 82–98)
MONOCYTES # BLD AUTO: 0.37 10*3/MM3 (ref 0.19–1.3)
MONOCYTES NFR BLD AUTO: 11.2 % (ref 4–12)
NEUTROPHILS # BLD AUTO: 2.11 10*3/MM3 (ref 1.87–8.4)
NEUTROPHILS NFR BLD AUTO: 63.9 % (ref 39–78)
NRBC BLD MANUAL-RTO: 0 /100 WBC (ref 0–0)
PLATELET # BLD AUTO: 131 10*3/MM3 (ref 130–400)
PMV BLD AUTO: 11 FL (ref 6–12)
POTASSIUM BLD-SCNC: 3.2 MMOL/L (ref 3.5–5.3)
RBC # BLD AUTO: 4.42 10*6/MM3 (ref 4.2–5.4)
SODIUM BLD-SCNC: 141 MMOL/L (ref 135–145)
WBC NRBC COR # BLD: 3.3 10*3/MM3 (ref 4.8–10.8)

## 2018-02-14 PROCEDURE — 85025 COMPLETE CBC W/AUTO DIFF WBC: CPT | Performed by: NURSE PRACTITIONER

## 2018-02-14 PROCEDURE — 83735 ASSAY OF MAGNESIUM: CPT | Performed by: NURSE PRACTITIONER

## 2018-02-14 PROCEDURE — 80048 BASIC METABOLIC PNL TOTAL CA: CPT | Performed by: NURSE PRACTITIONER

## 2018-02-14 PROCEDURE — 36415 COLL VENOUS BLD VENIPUNCTURE: CPT | Performed by: NURSE PRACTITIONER

## 2018-02-21 RX ORDER — FUROSEMIDE 40 MG/1
TABLET ORAL
Qty: 90 TABLET | Refills: 1 | Status: SHIPPED | OUTPATIENT
Start: 2018-02-21

## 2018-02-26 ENCOUNTER — HOSPITAL ENCOUNTER (EMERGENCY)
Facility: HOSPITAL | Age: 83
Discharge: HOME | End: 2018-02-26
Attending: EMERGENCY MEDICINE | Admitting: EMERGENCY MEDICINE

## 2018-02-26 ENCOUNTER — APPOINTMENT (OUTPATIENT)
Dept: GENERAL RADIOLOGY | Facility: HOSPITAL | Age: 83
End: 2018-02-26

## 2018-02-26 ENCOUNTER — OFFICE VISIT (OUTPATIENT)
Dept: INTERNAL MEDICINE | Age: 83
End: 2018-02-26
Payer: MEDICARE

## 2018-02-26 VITALS
OXYGEN SATURATION: 98 % | DIASTOLIC BLOOD PRESSURE: 64 MMHG | WEIGHT: 125.9 LBS | RESPIRATION RATE: 14 BRPM | SYSTOLIC BLOOD PRESSURE: 104 MMHG | HEART RATE: 76 BPM | HEIGHT: 65 IN | BODY MASS INDEX: 20.98 KG/M2 | TEMPERATURE: 98.8 F

## 2018-02-26 VITALS
OXYGEN SATURATION: 95 % | SYSTOLIC BLOOD PRESSURE: 110 MMHG | HEIGHT: 60 IN | RESPIRATION RATE: 18 BRPM | HEART RATE: 84 BPM | DIASTOLIC BLOOD PRESSURE: 63 MMHG | BODY MASS INDEX: 25.32 KG/M2 | WEIGHT: 129 LBS

## 2018-02-26 DIAGNOSIS — S81.811A LACERATION OF SKIN OF RIGHT LOWER LEG, INITIAL ENCOUNTER: Primary | ICD-10-CM

## 2018-02-26 DIAGNOSIS — S81.811D LACERATION OF RIGHT LOWER EXTREMITY, SUBSEQUENT ENCOUNTER: ICD-10-CM

## 2018-02-26 DIAGNOSIS — T14.8XXA AVULSION OF SKIN: Primary | ICD-10-CM

## 2018-02-26 DIAGNOSIS — F44.89 CONFUSION STATE: ICD-10-CM

## 2018-02-26 DIAGNOSIS — E87.6 HYPOKALEMIA: ICD-10-CM
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
## 2018-02-26 DIAGNOSIS — R39.15 URINARY URGENCY: ICD-10-CM

## 2018-02-26 DIAGNOSIS — E11.42 TYPE 2 DIABETES MELLITUS WITH DIABETIC POLYNEUROPATHY, WITHOUT LONG-TERM CURRENT USE OF INSULIN (HCC): ICD-10-CM

## 2018-02-26 DIAGNOSIS — Z91.81 STATUS POST FALL: ICD-10-CM

## 2018-02-26 LAB
ANION GAP SERPL CALCULATED.3IONS-SCNC: 19 MMOL/L (ref 7–19)
BASOPHILS ABSOLUTE: 0 K/UL (ref 0–0.2)
BASOPHILS RELATIVE PERCENT: 0.6 % (ref 0–1)
BUN BLDV-MCNC: 15 MG/DL (ref 8–23)
CALCIUM SERPL-MCNC: 9.8 MG/DL (ref 8.8–10.2)
CHLORIDE BLD-SCNC: 95 MMOL/L (ref 98–111)
CO2: 28 MMOL/L (ref 22–29)
CREAT SERPL-MCNC: 0.9 MG/DL (ref 0.5–0.9)
EOSINOPHILS ABSOLUTE: 0.2 K/UL (ref 0–0.6)
EOSINOPHILS RELATIVE PERCENT: 2.2 % (ref 0–5)
GFR NON-AFRICAN AMERICAN: >60
GLUCOSE BLD-MCNC: 108 MG/DL (ref 74–109)
HCT VFR BLD CALC: 43.1 % (ref 37–47)
HEMOGLOBIN: 14.2 G/DL (ref 12–16)
LYMPHOCYTES ABSOLUTE: 0.5 K/UL (ref 1.1–4.5)
LYMPHOCYTES RELATIVE PERCENT: 7.2 % (ref 20–40)
MCH RBC QN AUTO: 29.5 PG (ref 27–31)
MCHC RBC AUTO-ENTMCNC: 32.9 G/DL (ref 33–37)
MCV RBC AUTO: 89.6 FL (ref 81–99)
MONOCYTES ABSOLUTE: 0.4 K/UL (ref 0–0.9)
MONOCYTES RELATIVE PERCENT: 6.3 % (ref 0–10)
NEUTROPHILS ABSOLUTE: 5.7 K/UL (ref 1.5–7.5)
NEUTROPHILS RELATIVE PERCENT: 83.3 % (ref 50–65)
PDW BLD-RTO: 13.3 % (ref 11.5–14.5)
PLATELET # BLD: 203 K/UL (ref 130–400)
PMV BLD AUTO: 10.4 FL (ref 9.4–12.3)
POTASSIUM SERPL-SCNC: 3.1 MMOL/L (ref 3.5–5)
RBC # BLD: 4.81 M/UL (ref 4.2–5.4)
SODIUM BLD-SCNC: 142 MMOL/L (ref 136–145)
WBC # BLD: 6.9 K/UL (ref 4.8–10.8)

## 2018-02-26 PROCEDURE — G8484 FLU IMMUNIZE NO ADMIN: HCPCS | Performed by: INTERNAL MEDICINE

## 2018-02-26 PROCEDURE — 1123F ACP DISCUSS/DSCN MKR DOCD: CPT | Performed by: INTERNAL MEDICINE

## 2018-02-26 PROCEDURE — G8417 CALC BMI ABV UP PARAM F/U: HCPCS | Performed by: INTERNAL MEDICINE

## 2018-02-26 PROCEDURE — 73120 X-RAY EXAM OF HAND: CPT

## 2018-02-26 PROCEDURE — 1090F PRES/ABSN URINE INCON ASSESS: CPT | Performed by: INTERNAL MEDICINE

## 2018-02-26 PROCEDURE — G8598 ASA/ANTIPLAT THER USED: HCPCS | Performed by: INTERNAL MEDICINE

## 2018-02-26 PROCEDURE — G8400 PT W/DXA NO RESULTS DOC: HCPCS | Performed by: INTERNAL MEDICINE

## 2018-02-26 PROCEDURE — 99214 OFFICE O/P EST MOD 30 MIN: CPT | Performed by: INTERNAL MEDICINE

## 2018-02-26 PROCEDURE — G8427 DOCREV CUR MEDS BY ELIG CLIN: HCPCS | Performed by: INTERNAL MEDICINE

## 2018-02-26 PROCEDURE — 4040F PNEUMOC VAC/ADMIN/RCVD: CPT | Performed by: INTERNAL MEDICINE

## 2018-02-26 PROCEDURE — 1036F TOBACCO NON-USER: CPT | Performed by: INTERNAL MEDICINE

## 2018-02-26 PROCEDURE — 99283 EMERGENCY DEPT VISIT LOW MDM: CPT

## 2018-02-26 RX ORDER — LEVOFLOXACIN 500 MG/1
500 TABLET, FILM COATED ORAL DAILY
Qty: 5 TABLET | Refills: 0 | Status: SHIPPED | OUTPATIENT
Start: 2018-02-26 | End: 2018-03-03

## 2018-02-26 ASSESSMENT — ENCOUNTER SYMPTOMS
EYE PAIN: 0
BACK PAIN: 0
WHEEZING: 0
SPUTUM PRODUCTION: 0
VOMITING: 0
ABDOMINAL PAIN: 0
COUGH: 0
HEMOPTYSIS: 0
EYE DISCHARGE: 0
DIARRHEA: 0
EYES NEGATIVE: 1
NAUSEA: 0
SHORTNESS OF BREATH: 0

## 2018-02-26 NOTE — PROGRESS NOTES
 HYSTERECTOMY, TOTAL ABDOMINAL      ASTRID AND BSO      TOTAL KNEE ARTHROPLASTY         Current Outpatient Prescriptions   Medication Sig Dispense Refill    bisacodyl (DULCOLAX) 5 MG EC tablet Take 5 mg by mouth daily as needed for Constipation      mupirocin (BACTROBAN) 2 % ointment Apply 3 times daily. 1 Tube 0    levofloxacin (LEVAQUIN) 500 MG tablet Take 1 tablet by mouth daily for 5 days 5 tablet 0    furosemide (LASIX) 40 MG tablet TAKE 1 TABLET BY MOUTH ONCE DAILY. 90 tablet 1    gabapentin (NEURONTIN) 300 MG capsule TAKE (1) CAPSULE BY MOUTH TWICE DAILY. 60 capsule 2    potassium chloride (KLOR-CON M) 20 MEQ extended release tablet TAKE 1 TABLET BY MOUTH ONCE DAILY 90 tablet 3    rosuvastatin (CRESTOR) 20 MG tablet TAKE 1 TABLET BY MOUTH ONCE DAILY. 30 tablet 2    donepezil (ARICEPT) 10 MG tablet TAKE 1 TABLET BY MOUTH ONCE DAILY. 30 tablet 2    vitamin D (ERGOCALCIFEROL) 15139 units CAPS capsule TAKE 1 CAPSULE BY MOUTH ONCE A WEEK 4 capsule 2    alendronate (FOSAMAX) 70 MG tablet TAKE 1 TABLET ONCE A WEEK 4 tablet 5    metFORMIN (GLUCOPHAGE XR) 500 MG extended release tablet Take 1 tablet by mouth every evening 30 tablet 5    QUEtiapine (SEROQUEL) 25 MG tablet TAKE 1 TABLET BY MOUTH AT BEDTIME 90 tablet 1    Cholecalciferol (VITAMIN D3) 51208 units CAPS Take 1 capsule by mouth once a week 8 capsule 1    oxyCODONE-acetaminophen (PERCOCET) 7.5-325 MG per tablet Take 1 tablet by mouth every 6 hours as needed for Pain .  nystatin (MYCOSTATIN) 798374 UNIT/GM powder Apply 3 times daily. 1 Bottle 3    ASPIRIN PO Take 81 mg by mouth        No current facility-administered medications for this visit.       No Known Allergies    Social History     Social History    Marital status:      Spouse name: N/A    Number of children: N/A    Years of education: N/A     Social History Main Topics    Smoking status: Former Smoker     Types: Cigarettes     Quit date: 2/20/2000    Smokeless

## 2018-02-27 ENCOUNTER — TELEPHONE (OUTPATIENT)
Dept: INTERNAL MEDICINE CLINIC | Age: 83
End: 2018-02-27

## 2018-02-27 RX ORDER — POTASSIUM CHLORIDE 20 MEQ/1
20 TABLET, EXTENDED RELEASE ORAL 2 TIMES DAILY
Qty: 180 TABLET | Refills: 3 | Status: SHIPPED | OUTPATIENT
Start: 2018-02-27

## 2018-03-02 ENCOUNTER — TELEPHONE (OUTPATIENT)
Dept: INTERNAL MEDICINE CLINIC | Age: 83
End: 2018-03-02

## 2018-03-02 RX ORDER — ERGOCALCIFEROL 1.25 MG/1
CAPSULE ORAL
Qty: 4 CAPSULE | Refills: 0 | Status: SHIPPED | OUTPATIENT
Start: 2018-03-02 | End: 2018-05-02 | Stop reason: SDUPTHER

## 2018-03-12 ENCOUNTER — TELEPHONE (OUTPATIENT)
Dept: INTERNAL MEDICINE | Age: 83
End: 2018-03-12

## 2018-03-12 RX ORDER — CEFUROXIME AXETIL 500 MG/1
500 TABLET ORAL 2 TIMES DAILY
Qty: 14 TABLET | Refills: 0 | Status: SHIPPED | OUTPATIENT
Start: 2018-03-12 | End: 2018-03-19

## 2018-03-14 RX ORDER — ROSUVASTATIN CALCIUM 20 MG/1
TABLET, COATED ORAL
Qty: 90 TABLET | Refills: 3 | Status: SHIPPED | OUTPATIENT
Start: 2018-03-14

## 2018-03-20 ENCOUNTER — TELEPHONE (OUTPATIENT)
Dept: INTERNAL MEDICINE | Age: 83
End: 2018-03-20

## 2018-03-20 RX ORDER — FLUCONAZOLE 150 MG/1
150 TABLET ORAL DAILY
Qty: 2 TABLET | Refills: 0 | Status: SHIPPED | OUTPATIENT
Start: 2018-03-20 | End: 2018-03-22

## 2018-03-28 ENCOUNTER — TELEPHONE (OUTPATIENT)
Dept: INTERNAL MEDICINE | Age: 83
End: 2018-03-28

## 2018-04-13 RX ORDER — DONEPEZIL HYDROCHLORIDE 10 MG/1
TABLET, FILM COATED ORAL
Qty: 30 TABLET | Refills: 2 | Status: SHIPPED | OUTPATIENT
Start: 2018-04-13

## 2018-04-27 ENCOUNTER — TELEPHONE (OUTPATIENT)
Dept: INTERNAL MEDICINE CLINIC | Age: 83
End: 2018-04-27

## 2018-04-30 ENCOUNTER — TELEPHONE (OUTPATIENT)
Dept: INTERNAL MEDICINE | Age: 83
End: 2018-04-30

## 2018-04-30 RX ORDER — TRAZODONE HYDROCHLORIDE 50 MG/1
50 TABLET ORAL NIGHTLY
Qty: 30 TABLET | Refills: 0 | Status: SHIPPED | OUTPATIENT
Start: 2018-04-30

## 2018-05-02 RX ORDER — ERGOCALCIFEROL 1.25 MG/1
CAPSULE ORAL
Qty: 4 CAPSULE | Refills: 5 | Status: SHIPPED | OUTPATIENT
Start: 2018-05-02

## 2018-05-11 ENCOUNTER — TELEPHONE (OUTPATIENT)
Dept: INTERNAL MEDICINE | Age: 83
End: 2018-05-11

## 2018-05-11 RX ORDER — QUETIAPINE FUMARATE 25 MG/1
TABLET, FILM COATED ORAL
Qty: 90 TABLET | Refills: 3 | Status: SHIPPED | OUTPATIENT
Start: 2018-05-11

## 2018-05-16 RX ORDER — METFORMIN HYDROCHLORIDE 500 MG/1
TABLET, EXTENDED RELEASE ORAL
Qty: 30 TABLET | Refills: 5 | Status: SHIPPED | OUTPATIENT
Start: 2018-05-16

## 2018-05-16 RX ORDER — GABAPENTIN 300 MG/1
CAPSULE ORAL
Qty: 60 CAPSULE | Refills: 2 | Status: SHIPPED | OUTPATIENT
Start: 2018-05-16 | End: 2018-06-15

## 2018-05-17 ENCOUNTER — APPOINTMENT (OUTPATIENT)
Dept: ULTRASOUND IMAGING | Facility: HOSPITAL | Age: 83
End: 2018-05-17

## 2018-05-17 ENCOUNTER — HOSPITAL ENCOUNTER (OUTPATIENT)
Facility: HOSPITAL | Age: 83
Setting detail: OBSERVATION
Discharge: HOME | End: 2018-05-18
Attending: FAMILY MEDICINE | Admitting: FAMILY MEDICINE

## 2018-05-17 ENCOUNTER — APPOINTMENT (OUTPATIENT)
Dept: GENERAL RADIOLOGY | Facility: HOSPITAL | Age: 83
End: 2018-05-17

## 2018-05-17 ENCOUNTER — TELEPHONE (OUTPATIENT)
Dept: INTERNAL MEDICINE | Age: 83
End: 2018-05-17

## 2018-05-17 ENCOUNTER — APPOINTMENT (OUTPATIENT)
Dept: CT IMAGING | Facility: HOSPITAL | Age: 83
End: 2018-05-17

## 2018-05-17 DIAGNOSIS — R41.82 ALTERED MENTAL STATUS, UNSPECIFIED ALTERED MENTAL STATUS TYPE: ICD-10-CM

## 2018-05-17 DIAGNOSIS — R50.9 FEVER, UNSPECIFIED FEVER CAUSE: ICD-10-CM

## 2018-05-17 DIAGNOSIS — R13.12 OROPHARYNGEAL DYSPHAGIA: ICD-10-CM

## 2018-05-17 DIAGNOSIS — L03.119 CELLULITIS OF LOWER EXTREMITY, UNSPECIFIED LATERALITY: Primary | ICD-10-CM

## 2018-05-17 DIAGNOSIS — Z74.09 IMPAIRED MOBILITY AND ADLS: ICD-10-CM

## 2018-05-17 DIAGNOSIS — Z78.9 IMPAIRED MOBILITY AND ADLS: ICD-10-CM

## 2018-05-17 LAB
ALBUMIN SERPL-MCNC: 3.4 G/DL (ref 3.5–5)
ALBUMIN/GLOB SERPL: 1.4 G/DL (ref 1.1–2.5)
ALP SERPL-CCNC: 52 U/L (ref 24–120)
ALT SERPL W P-5'-P-CCNC: 28 U/L (ref 0–54)
ANION GAP SERPL CALCULATED.3IONS-SCNC: 8 MMOL/L (ref 4–13)
APTT PPP: 32 SECONDS (ref 24.1–34.8)
ARTERIAL PATENCY WRIST A: POSITIVE
AST SERPL-CCNC: 30 U/L (ref 7–45)
ATMOSPHERIC PRESS: 748 MMHG
BACTERIA UR QL AUTO: ABNORMAL /HPF
BASE EXCESS BLDA CALC-SCNC: 3.1 MMOL/L (ref 0–2)
BASOPHILS # BLD AUTO: 0.02 10*3/MM3 (ref 0–0.2)
BASOPHILS NFR BLD AUTO: 0.4 % (ref 0–2)
BDY SITE: ABNORMAL
BILIRUB SERPL-MCNC: 0.4 MG/DL (ref 0.1–1)
BILIRUB UR QL STRIP: NEGATIVE
BODY TEMPERATURE: 37 C
BUN BLD-MCNC: 12 MG/DL (ref 5–21)
BUN/CREAT SERPL: 18.5 (ref 7–25)
CALCIUM SPEC-SCNC: 9.2 MG/DL (ref 8.4–10.4)
CHLORIDE SERPL-SCNC: 103 MMOL/L (ref 98–110)
CLARITY UR: ABNORMAL
CO2 SERPL-SCNC: 31 MMOL/L (ref 24–31)
COLOR UR: YELLOW
CREAT BLD-MCNC: 0.65 MG/DL (ref 0.5–1.4)
D DIMER PPP FEU-MCNC: 0.88 MG/L (FEU) (ref 0–0.5)
D-LACTATE SERPL-SCNC: 1 MMOL/L (ref 0.5–2)
DEPRECATED RDW RBC AUTO: 47.8 FL (ref 40–54)
EOSINOPHIL # BLD AUTO: 0.09 10*3/MM3 (ref 0–0.7)
EOSINOPHIL NFR BLD AUTO: 2 % (ref 0–4)
ERYTHROCYTE [DISTWIDTH] IN BLOOD BY AUTOMATED COUNT: 15 % (ref 12–15)
GFR SERPL CREATININE-BSD FRML MDRD: 87 ML/MIN/1.73
GLOBULIN UR ELPH-MCNC: 2.5 GM/DL
GLUCOSE BLD-MCNC: 117 MG/DL (ref 70–100)
GLUCOSE UR STRIP-MCNC: NEGATIVE MG/DL
HCO3 BLDA-SCNC: 26.1 MMOL/L (ref 20–26)
HCT VFR BLD AUTO: 37.5 % (ref 37–47)
HGB BLD-MCNC: 12.1 G/DL (ref 12–16)
HGB UR QL STRIP.AUTO: NEGATIVE
HYALINE CASTS UR QL AUTO: ABNORMAL /LPF
IMM GRANULOCYTES # BLD: 0.02 10*3/MM3 (ref 0–0.03)
IMM GRANULOCYTES NFR BLD: 0.4 % (ref 0–5)
INR PPP: 0.93 (ref 0.91–1.09)
KETONES UR QL STRIP: NEGATIVE
LEUKOCYTE ESTERASE UR QL STRIP.AUTO: NEGATIVE
LYMPHOCYTES # BLD AUTO: 0.38 10*3/MM3 (ref 0.72–4.86)
LYMPHOCYTES NFR BLD AUTO: 8.4 % (ref 15–45)
Lab: ABNORMAL
MCH RBC QN AUTO: 28.3 PG (ref 28–32)
MCHC RBC AUTO-ENTMCNC: 32.3 G/DL (ref 33–36)
MCV RBC AUTO: 87.6 FL (ref 82–98)
MODALITY: ABNORMAL
MONOCYTES # BLD AUTO: 0.23 10*3/MM3 (ref 0.19–1.3)
MONOCYTES NFR BLD AUTO: 5.1 % (ref 4–12)
NEUTROPHILS # BLD AUTO: 3.79 10*3/MM3 (ref 1.87–8.4)
NEUTROPHILS NFR BLD AUTO: 83.7 % (ref 39–78)
NITRITE UR QL STRIP: NEGATIVE
NRBC BLD MANUAL-RTO: 0 /100 WBC (ref 0–0)
NT-PROBNP SERPL-MCNC: 2170 PG/ML (ref 0–1800)
PCO2 BLDA: 33.6 MM HG (ref 35–45)
PH BLDA: 7.5 PH UNITS (ref 7.35–7.45)
PH UR STRIP.AUTO: 8.5 [PH] (ref 5–8)
PLATELET # BLD AUTO: 191 10*3/MM3 (ref 130–400)
PMV BLD AUTO: 9.9 FL (ref 6–12)
PO2 BLDA: 70.7 MM HG (ref 83–108)
POTASSIUM BLD-SCNC: 4.1 MMOL/L (ref 3.5–5.3)
PROCALCITONIN SERPL-MCNC: <0.25 NG/ML
PROT SERPL-MCNC: 5.9 G/DL (ref 6.3–8.7)
PROT UR QL STRIP: ABNORMAL
PROTHROMBIN TIME: 12.7 SECONDS (ref 11.9–14.6)
RBC # BLD AUTO: 4.28 10*6/MM3 (ref 4.2–5.4)
RBC # UR: ABNORMAL /HPF
REF LAB TEST METHOD: ABNORMAL
SAO2 % BLDCOA: 95.7 % (ref 94–99)
SODIUM BLD-SCNC: 142 MMOL/L (ref 135–145)
SP GR UR STRIP: 1.02 (ref 1–1.03)
SQUAMOUS #/AREA URNS HPF: ABNORMAL /HPF
TROPONIN I SERPL-MCNC: 0.04 NG/ML (ref 0–0.03)
TROPONIN I SERPL-MCNC: 0.04 NG/ML (ref 0–0.03)
UROBILINOGEN UR QL STRIP: ABNORMAL
VENTILATOR MODE: ABNORMAL
WBC NRBC COR # BLD: 4.53 10*3/MM3 (ref 4.8–10.8)
WBC UR QL AUTO: ABNORMAL /HPF

## 2018-05-17 PROCEDURE — 96374 THER/PROPH/DIAG INJ IV PUSH: CPT

## 2018-05-17 PROCEDURE — 87040 BLOOD CULTURE FOR BACTERIA: CPT | Performed by: FAMILY MEDICINE

## 2018-05-17 PROCEDURE — 71275 CT ANGIOGRAPHY CHEST: CPT

## 2018-05-17 PROCEDURE — G0378 HOSPITAL OBSERVATION PER HR: HCPCS

## 2018-05-17 PROCEDURE — 93970 EXTREMITY STUDY: CPT | Performed by: SURGERY

## 2018-05-17 PROCEDURE — 85379 FIBRIN DEGRADATION QUANT: CPT | Performed by: FAMILY MEDICINE

## 2018-05-17 PROCEDURE — 99284 EMERGENCY DEPT VISIT MOD MDM: CPT

## 2018-05-17 PROCEDURE — 81001 URINALYSIS AUTO W/SCOPE: CPT | Performed by: FAMILY MEDICINE

## 2018-05-17 PROCEDURE — 25010000002 CEFTRIAXONE PER 250 MG: Performed by: FAMILY MEDICINE

## 2018-05-17 PROCEDURE — 80053 COMPREHEN METABOLIC PANEL: CPT | Performed by: FAMILY MEDICINE

## 2018-05-17 PROCEDURE — 70450 CT HEAD/BRAIN W/O DYE: CPT

## 2018-05-17 PROCEDURE — 25010000002 IOPAMIDOL 61 % SOLUTION: Performed by: FAMILY MEDICINE

## 2018-05-17 PROCEDURE — 25010000002 ENOXAPARIN PER 10 MG: Performed by: NURSE PRACTITIONER

## 2018-05-17 PROCEDURE — 84484 ASSAY OF TROPONIN QUANT: CPT | Performed by: FAMILY MEDICINE

## 2018-05-17 PROCEDURE — 82803 BLOOD GASES ANY COMBINATION: CPT

## 2018-05-17 PROCEDURE — 96361 HYDRATE IV INFUSION ADD-ON: CPT

## 2018-05-17 PROCEDURE — 84484 ASSAY OF TROPONIN QUANT: CPT | Performed by: NURSE PRACTITIONER

## 2018-05-17 PROCEDURE — 85025 COMPLETE CBC W/AUTO DIFF WBC: CPT | Performed by: FAMILY MEDICINE

## 2018-05-17 PROCEDURE — 36600 WITHDRAWAL OF ARTERIAL BLOOD: CPT

## 2018-05-17 PROCEDURE — 93970 EXTREMITY STUDY: CPT

## 2018-05-17 PROCEDURE — 83880 ASSAY OF NATRIURETIC PEPTIDE: CPT | Performed by: FAMILY MEDICINE

## 2018-05-17 PROCEDURE — 85730 THROMBOPLASTIN TIME PARTIAL: CPT | Performed by: FAMILY MEDICINE

## 2018-05-17 PROCEDURE — 83605 ASSAY OF LACTIC ACID: CPT | Performed by: FAMILY MEDICINE

## 2018-05-17 PROCEDURE — 96365 THER/PROPH/DIAG IV INF INIT: CPT

## 2018-05-17 PROCEDURE — 71045 X-RAY EXAM CHEST 1 VIEW: CPT

## 2018-05-17 PROCEDURE — 84145 PROCALCITONIN (PCT): CPT | Performed by: FAMILY MEDICINE

## 2018-05-17 PROCEDURE — 96372 THER/PROPH/DIAG INJ SC/IM: CPT

## 2018-05-17 PROCEDURE — 85610 PROTHROMBIN TIME: CPT | Performed by: FAMILY MEDICINE

## 2018-05-17 RX ORDER — PHENOL 1.4 %
10 AEROSOL, SPRAY (ML) MUCOUS MEMBRANE NIGHTLY
COMMUNITY
End: 2018-05-18 | Stop reason: HOSPADM

## 2018-05-17 RX ORDER — NICOTINE POLACRILEX 4 MG
15 LOZENGE BUCCAL
Status: DISCONTINUED | OUTPATIENT
Start: 2018-05-17 | End: 2018-05-18 | Stop reason: HOSPADM

## 2018-05-17 RX ORDER — SODIUM CHLORIDE 0.9 % (FLUSH) 0.9 %
1-10 SYRINGE (ML) INJECTION AS NEEDED
Status: DISCONTINUED | OUTPATIENT
Start: 2018-05-17 | End: 2018-05-18 | Stop reason: HOSPADM

## 2018-05-17 RX ORDER — ONDANSETRON 2 MG/ML
4 INJECTION INTRAMUSCULAR; INTRAVENOUS EVERY 6 HOURS PRN
Status: DISCONTINUED | OUTPATIENT
Start: 2018-05-17 | End: 2018-05-18 | Stop reason: HOSPADM

## 2018-05-17 RX ORDER — ONDANSETRON 4 MG/1
4 TABLET, ORALLY DISINTEGRATING ORAL EVERY 6 HOURS PRN
Status: DISCONTINUED | OUTPATIENT
Start: 2018-05-17 | End: 2018-05-18 | Stop reason: HOSPADM

## 2018-05-17 RX ORDER — ONDANSETRON 4 MG/1
4 TABLET, FILM COATED ORAL EVERY 6 HOURS PRN
Status: DISCONTINUED | OUTPATIENT
Start: 2018-05-17 | End: 2018-05-18 | Stop reason: HOSPADM

## 2018-05-17 RX ORDER — SODIUM CHLORIDE 9 MG/ML
75 INJECTION, SOLUTION INTRAVENOUS CONTINUOUS
Status: DISCONTINUED | OUTPATIENT
Start: 2018-05-17 | End: 2018-05-18

## 2018-05-17 RX ORDER — SAW/PYGEUM/BETA/HERB/D3/B6/ZN 30 MG-25MG
10 CAPSULE ORAL NIGHTLY
Status: ON HOLD | COMMUNITY
End: 2018-05-17

## 2018-05-17 RX ORDER — ACETAMINOPHEN 650 MG/1
650 SUPPOSITORY RECTAL EVERY 4 HOURS PRN
Status: DISCONTINUED | OUTPATIENT
Start: 2018-05-17 | End: 2018-05-18 | Stop reason: HOSPADM

## 2018-05-17 RX ORDER — DEXTROSE MONOHYDRATE 25 G/50ML
25 INJECTION, SOLUTION INTRAVENOUS
Status: DISCONTINUED | OUTPATIENT
Start: 2018-05-17 | End: 2018-05-18 | Stop reason: HOSPADM

## 2018-05-17 RX ORDER — CLINDAMYCIN PHOSPHATE 600 MG/50ML
600 INJECTION INTRAVENOUS EVERY 8 HOURS
Status: DISCONTINUED | OUTPATIENT
Start: 2018-05-17 | End: 2018-05-18 | Stop reason: HOSPADM

## 2018-05-17 RX ADMIN — SODIUM CHLORIDE 1000 ML: 9 INJECTION, SOLUTION INTRAVENOUS at 13:53

## 2018-05-17 RX ADMIN — ENOXAPARIN SODIUM 40 MG: 40 INJECTION SUBCUTANEOUS at 18:32

## 2018-05-17 RX ADMIN — IOPAMIDOL 100 ML: 612 INJECTION, SOLUTION INTRAVENOUS at 15:15

## 2018-05-17 RX ADMIN — SODIUM CHLORIDE 100 ML/HR: 9 INJECTION, SOLUTION INTRAVENOUS at 18:33

## 2018-05-17 RX ADMIN — CEFTRIAXONE SODIUM 1 G: 1 INJECTION, POWDER, FOR SOLUTION INTRAMUSCULAR; INTRAVENOUS at 15:47

## 2018-05-17 RX ADMIN — CLINDAMYCIN PHOSPHATE 600 MG: 12 INJECTION, SOLUTION INTRAVENOUS at 18:32

## 2018-05-17 NOTE — H&P
[facility] Medicine Services  HISTORY AND PHYSICAL    Date of Admission: 5/17/2018  Primary Care Physician: Mel Abraham MD    Subjective     Chief Complaint: Altered mental status    History of Present Illness  Marina Hyman is an 83-year-old  female who currently resides at the [facility].  Patient's daughter is at bedside and provides history of present illness.  Apparently patient was in her usual state of health 4 days ago however this am staff found her in a chair, refusing to eat or talk.  Her only comments were that she was freezing and she wanted to get warm.  She had put on a pair of gloves and was covered with multiple blankets.  She has a past medical history of dementia, diabetes mellitus type II, congestive heart failure, L2 fracture-2 years ago followed by Dr. YASMIN bauman her receiving Lortabs and injections.  She is also had chronic bilateral lower extremity wounds from skin tears after a fall several months ago this is been followed primary care provider Dr. Mel Abraham.  Currently lower extremities are erythemic without blistering or drainage, questionable cellulitis patient continues to not follow commands or talk she does respond to painful stimuli and will suddenly awaken and say her right leg is hurting.  Palpating the right leg did cause her to pull back in pain and with grimacing.  Patient's daughter states her mother has a tendency to play possible.  Daughter states she feels her mother eats to be placed in a nursing home at this time as she has become somewhat combative at assisted living, has escaped twice.  And does not not like to be alone.     Review of Systems   Unable to determine due to patient's mental status    Past Medical History   Past Medical History:   Diagnosis Date   • CHF (congestive heart failure)    • Diabetes mellitus        Past Surgical History:   Past Surgical History:   Procedure Laterality Date   • AORTIC  VALVE REPAIR/REPLACEMENT         Family History: Mother  from lung cancer. Father  from MI.    Social History:  reports that she has quit smoking. She quit after 20.00 years of use. She has never used smokeless tobacco. She reports that she does not drink alcohol or use drugs.    Code Status: Conditional, Daughter Kelsie Casper is her POA      Allergies:  Allergies   Allergen Reactions   • Ativan [Lorazepam] Delirium       Medications:  Prior to Admission medications    Medication Sig Start Date End Date Taking? Authorizing Provider   MELATONIN ER PO Take  by mouth.   Yes Historical Provider, MD   acetaminophen (TYLENOL) 325 MG tablet Take 2 tablets by mouth Every 4 (Four) Hours As Needed for Mild Pain . 18   CARLEY Carnes   alendronate (FOSAMAX) 70 MG tablet Take 70 mg by mouth Every 7 (Seven) Days. Monday    Historical Provider, MD   aspirin 81 MG EC tablet Take 81 mg by mouth Daily.    Historical Provider, MD   bisacodyl (DULCOLAX) 5 MG EC tablet Take 1 tablet by mouth Daily As Needed for Constipation. 18   CARLEY Carnes   donepezil (ARICEPT) 10 MG tablet Take 10 mg by mouth Every Night.    Historical Provider, MD   furosemide (LASIX) 40 MG tablet Take 40 mg by mouth Daily.    Historical Provider, MD   gabapentin (NEURONTIN) 300 MG capsule Take 1 capsule by mouth 2 (Two) Times a Day. 18   CARLEY Carnes   insulin lispro (humaLOG) 100 UNIT/ML injection Inject 2-7 Units under the skin 4 (Four) Times a Day With Meals & at Bedtime. 18   CARLEY Carnes   metFORMIN ER (GLUCOPHAGE-XR) 500 MG 24 hr tablet Take 500 mg by mouth Every Evening.    Historical Provider, MD   nystatin (MYCOSTATIN) 784877 UNIT/GM cream Apply  topically Every 12 (Twelve) Hours. 18   CARLEY Carnes   oxyCODONE-acetaminophen (PERCOCET)  MG per tablet Take 1 tablet by mouth Every 8 (Eight) Hours As Needed for Moderate Pain . 18   CARLEY Carnes  "  potassium chloride (KLOR-CON) 20 MEQ packet Take 20 mEq by mouth Daily.    Historical Provider, MD   rosuvastatin (CRESTOR) 20 MG tablet Take 20 mg by mouth Daily.    Historical Provider, MD   vitamin D (ERGOCALCIFEROL) 77614 units capsule capsule Take 50,000 Units by mouth 1 (One) Time Per Week. Monday.    Historical Provider, MD       Objective     /44   Pulse 75   Temp 99.1 °F (37.3 °C) (Temporal Artery )   Resp 14   Ht 152.4 cm (60\")   Wt 56.7 kg (125 lb)   SpO2 96%   BMI 24.41 kg/m²      Physical Exam   Constitutional: She is oriented to person, place, and time. She appears well-developed and well-nourished. No distress.   HENT:   Head: Normocephalic and atraumatic.   Eyes: Conjunctivae are normal. Pupils are equal, round, and reactive to light. No scleral icterus.   Neck: Normal range of motion. Neck supple. No JVD present. No tracheal deviation present.   Cardiovascular: Normal rate, regular rhythm and intact distal pulses.  Exam reveals no gallop.    Murmur (and click from mechanical valve) heard.  Pulmonary/Chest: Effort normal and breath sounds normal. No respiratory distress. She has no wheezes. She has no rales.   Abdominal: Soft. Bowel sounds are normal. She exhibits no distension. There is no tenderness. There is no guarding.   Musculoskeletal: Normal range of motion. She exhibits edema (bilateral lower extremities).   Neurological: She is alert and oriented to person, place, and time.   No obvious deficits noted.   Skin: Skin is warm and dry. No rash noted. She is not diaphoretic. There is erythema (bilateral LE, dressings removed - left wound near completely healed, right with eschar, trace drainage). No pallor.   Psychiatric: She has a normal mood and affect. Her behavior is normal.   Vitals reviewed.    Pertinent Data:   Lab Results (last 72 hours)     Procedure Component Value Units Date/Time    Procalcitonin [[id number]]  (Normal) Collected:  05/17/18 1319    Specimen:  Blood " Updated:  05/17/18 1409     Procalcitonin <0.25 ng/mL     BNP [[id number]]  (Abnormal) Collected:  05/17/18 1319    Specimen:  Blood Updated:  05/17/18 1351     proBNP 2,170.0 (H) pg/mL     Troponin [[id number]]  (Abnormal) Collected:  05/17/18 1319    Specimen:  Blood Updated:  05/17/18 1351     Troponin I 0.038 (H) ng/mL     Blood Culture - Blood, [[id number]] Collected:  05/17/18 1310    Specimen:  Blood from Arm, Left Updated:  05/17/18 1344    Blood Culture - Blood, [[id number]] Collected:  05/17/18 1314    Specimen:  Blood from Arm, Right Updated:  05/17/18 1344    Protime-INR [[id number]]  (Normal) Collected:  05/17/18 1319    Specimen:  Blood Updated:  05/17/18 1341     Protime 12.7 Seconds      INR 0.93    aPTT [[id number]]  (Normal) Collected:  05/17/18 1319    Specimen:  Blood Updated:  05/17/18 1341     PTT 32.0 seconds     D-dimer, Quantitative [[id number]]  (Abnormal) Collected:  05/17/18 1319    Specimen:  Blood Updated:  05/17/18 1341     D-Dimer, Quantitative 0.88 (H) mg/L (FEU)     Narrative:       Reference Range is 0-0.50 mg/L FEU. However, results <0.50 mg/L FEU tends to rule out DVT or PE. Results >0.50 mg/L FEU are not useful in predicting absence or presence of DVT or PE.    Urinalysis With / Microscopic If Indicated - Urine, Catheter [[id number]]  (Abnormal) Collected:  05/17/18 1327    Specimen:  Urine from Urine, Catheter Updated:  05/17/18 1340     Color, UA Yellow     Appearance, UA Turbid (A)     pH, UA 8.5 (H)     Specific Gravity, UA 1.019     Glucose, UA Negative     Ketones, UA Negative     Bilirubin, UA Negative     Blood, UA Negative     Protein, UA 30 mg/dL (1+) (A)     Leuk Esterase, UA Negative     Nitrite, UA Negative     Urobilinogen, UA 1.0 E.U./dL    Urinalysis, Microscopic Only - Urine, Clean Catch [[id number]]  (Abnormal) Collected:  05/17/18 1327    Specimen:  Urine from Urine, Catheter Updated:  05/17/18 1340     RBC, UA 3-5 (A) /HPF      WBC, UA 0-2 (A) /HPF      Bacteria, UA  None Seen /HPF      Squamous Epithelial Cells, UA 0-2 /HPF      Hyaline Casts, UA 0-2 /LPF      Methodology Automated Microscopy    Lactic Acid, Plasma [[id number]]  (Normal) Collected:  05/17/18 1319    Specimen:  Blood Updated:  05/17/18 1340     Lactate 1.0 mmol/L     Comprehensive Metabolic Panel [[id number]]  (Abnormal) Collected:  05/17/18 1319    Specimen:  Blood Updated:  05/17/18 1339     Glucose 117 (H) mg/dL      BUN 12 mg/dL      Creatinine 0.65 mg/dL      Sodium 142 mmol/L      Potassium 4.1 mmol/L      Chloride 103 mmol/L      CO2 31.0 mmol/L      Calcium 9.2 mg/dL      Total Protein 5.9 (L) g/dL      Albumin 3.40 (L) g/dL      ALT (SGPT) 28 U/L      AST (SGOT) 30 U/L      Alkaline Phosphatase 52 U/L      Total Bilirubin 0.4 mg/dL      eGFR Non African Amer 87 mL/min/1.73      Globulin 2.5 gm/dL      A/G Ratio 1.4 g/dL      BUN/Creatinine Ratio 18.5     Anion Gap 8.0 mmol/L     Narrative:       The MDRD GFR formula is only valid for adults with stable renal function between ages 18 and 70.    Blood Gas, Arterial [[id number]]  (Abnormal) Collected:  05/17/18 1332    Specimen:  Arterial Blood Updated:  05/17/18 1338     Site Right Radial     Doug's Test Positive     pH, Arterial 7.499 (H) pH units      pCO2, Arterial 33.6 (L) mm Hg      pO2, Arterial 70.7 (L) mm Hg      HCO3, Arterial 26.1 (H) mmol/L      Base Excess, Arterial 3.1 (H) mmol/L      O2 Saturation, Arterial 95.7 %      Temperature 37.0 C      Barometric Pressure for Blood Gas 748 mmHg      Modality Room Air     Ventilator Mode NA     Collected by [id number]    CBC Auto Differential [[id number]]  (Abnormal) Collected:  05/17/18 1318    Specimen:  Blood Updated:  05/17/18 1328     WBC 4.53 (L) 10*3/mm3      RBC 4.28 10*6/mm3      Hemoglobin 12.1 g/dL      Hematocrit 37.5 %      MCV 87.6 fL      MCH 28.3 pg      MCHC 32.3 (L) g/dL      RDW 15.0 %      RDW-SD 47.8 fl      MPV 9.9 fL      Platelets 191 10*3/mm3      Neutrophil % 83.7 (H) %       Lymphocyte % 8.4 (L) %      Monocyte % 5.1 %      Eosinophil % 2.0 %      Basophil % 0.4 %      Immature Grans % 0.4 %      Neutrophils, Absolute 3.79 10*3/mm3      Lymphocytes, Absolute 0.38 (L) 10*3/mm3      Monocytes, Absolute 0.23 10*3/mm3      Eosinophils, Absolute 0.09 10*3/mm3      Basophils, Absolute 0.02 10*3/mm3      Immature Grans, Absolute 0.02 10*3/mm3      nRBC 0.0 /100 WBC         Imaging Results (last 24 hours)     Procedure Component Value Units Date/Time    US Venous Doppler Lower Extremity Bilateral (duplex) [[id number]] Collected:  05/17/18 1713     Updated:  05/17/18 1716    Narrative:       History: Swelling       Impression:       Impression: There is no evidence of deep venous thrombosis or  superficial thrombophlebitis of right or left lower extremities.     Comments: Bilateral lower extremity venous duplex exam was performed using color Doppler flow, Doppler waveform analysis, and grayscale imaging, with and without compression. There is no evidence of deep venous thrombosis in the common femoral, superficial femoral, popliteal, peroneal, anterior tibial, and posterior tibial veins bilaterally. No thrombus is identified in the saphenofemoral junctions and greater  saphenous veins bilaterally.         This report was finalized on 05/17/2018 17:13 by Dr. Jaya Sandoval MD.    CT Angiogram Chest With Contrast [[id number]] Collected:  05/17/18 1539     Updated:  05/17/18 1548    Narrative:       EXAMINATION: CT ANGIOGRAM CHEST W CONTRAST-      5/17/2018 3:09 PM CDT     HISTORY: elevated dimer, ams, fever     In order to have a CT radiation dose as low as reasonably achievable  Automated Exposure Control was utilized for adjustment of the mA and/or  KV according to patient size.     DLP in mGycm= 241.     CT angiography protocol.   CT imaging with bolus IV contrast injection.   Under concurrent supervision axial, sagittal, coronal, and  three-dimensional data sets were constructed.      Cardiomegaly.  No thoracic aortic aneurysm or dissection.     Symmetric well opacified pulmonary arteries.  No pulmonary embolism.     Chronic lung changes with basilar atelectasis. Trace amounts of pleural  fluid.     No pneumonia or pneumothorax.     Chronic appearing lower thoracic compression fractures.     Summary:  1. No pulmonary embolism.  2. Chronic lung changes with no acute abnormality.      This report was finalized on 05/17/2018 15:45 by Dr. Arley Patel MD.    CT Head Without Contrast [[id number]] Collected:  05/17/18 1538     Updated:  05/17/18 1542    Narrative:       EXAMINATION: CT HEAD WO CONTRAST-      5/17/2018 3:09 PM CDT     HISTORY: Altered mental status.     In order to have a CT radiation dose as low as reasonably achievable  Automated Exposure Control was utilized for adjustment of the mA and/or  KV according to patient size.     DLP in mGycm= 759.     Noncontrast head CT compared with 12/28/2017.     Moderate central atrophy and small vessel disease.  Mild ventricular enlargement.     No hemorrhage or mass effect.     Clear paranasal sinuses.     Summary:  1. Stable chronic change with no acute intracranial abnormality.      This report was finalized on 05/17/2018 15:39 by Dr. Arley Patel MD.    XR Chest 1 View [[id number]] Collected:  05/17/18 1358     Updated:  05/17/18 1402    Narrative:       EXAMINATION: XR CHEST 1 VW-     5/17/2018 1:25 PM CDT     HISTORY: Altered mental status.     One view chest x-ray compared with 12/30/2017.     Cardiomegaly with aortic arch calcification.  Prosthetic heart valve.     Chronic lung changes with hyperexpansion.     No sign of pneumonia.     No pneumothorax or heart failure.     Summary:  1. Stable chronic change.    This report was finalized on 05/17/2018 13:59 by Dr. Arley Patel MD.          I have personally reviewed and interpreted the radiology studies and ECG obtained at time of admission.     Assessment / Plan     Assessment:   1.  Altered mental status  2. Dementia  3. Cellulitis bilateral lower extremities  4. CHF w/o exacerbation  5. Diabetes   6.  Elevated troponin    Plan:   1. Admit as observation  2. Start Cleocin 600mg every 8 hours  3. NS at 100ml/hours  4. NPO until cleared by ST  5. OT/PT consulted to evaluate and treat  6. DVT prophylaxis with Lovenox only  7.  Glucose monitoring every 6 hours while nothing by mouth with sliding scale coverage, regular insulin  8.  Consult wound nurse to evaluate and treat chronic bilateral lower extremity wounds  9.  Echocardiogram and MRI the brain without contrast  10.  Labs in a.m. BMP, CBC, A1c, lipid panel, TSH  11.  Trend troponins  12.  Neuro checks every 4 hours, turn patient every 2 hours, cardiac monitoring, oxygen supplement as needed.      I discussed the patients findings and my recommendations with: Modesto Humphreys D.O.  Time spent: 40 minutes      Pavithra Doan, APRN  05/17/18   3:34 PM

## 2018-05-17 NOTE — ED PROVIDER NOTES
[facility]  eMERGENCY dEPARTMENT eNCOUnter      Pt Name: Marina Hyman  MRN: [id number]  Birthdate 1935  Date of evaluation: 5/17/2018      CHIEF COMPLAINT       Chief Complaint   Patient presents with   • Fatigue   • Leg Swelling   • Dementia       Nurses Notes reviewed and I agree except as noted in the HPI.      HISTORY OF PRESENT ILLNESS    Marina Hyman is a 83 y.o. female who presents     Patient presents for altered mental status with daughter.  Daughter reports that she had a fever earlier today of 102.  She states that she has had some cellulitis of her bilateral lower extremities for some time is that does look like it might be worsening.  She also has been very tired wanting to sleep a lot over the last 24 hours patient's daughter states that this is absolutely not her.  Patient's daughter is worried that she has an infection somewhere possibly a UTI.  She states she does have the increased cellulitis on her lower extremities.  She states that the symptoms were there when the patient woke up today.  Patient had no treatment prior to arrival.     REVIEW OF SYSTEMS     Review of Systems    Unable to obtain    PAST MEDICAL HISTORY     Past Medical History:   Diagnosis Date   • CHF (congestive heart failure)    • Diabetes mellitus        SURGICAL HISTORY      has a past surgical history that includes Aortic valve replacement.    CURRENT MEDICATIONS        Medication List      ASK your doctor about these medications    acetaminophen 325 MG tablet  Commonly known as:  TYLENOL  Take 2 tablets by mouth Every 4 (Four) Hours As Needed for Mild Pain .     alendronate 70 MG tablet  Commonly known as:  FOSAMAX     aspirin 81 MG EC tablet     bisacodyl 5 MG EC tablet  Commonly known as:  DULCOLAX  Take 1 tablet by mouth Daily As Needed for Constipation.     donepezil 10 MG tablet  Commonly known as:  ARICEPT     furosemide 40 MG tablet  Commonly known as:  LASIX     gabapentin 300 MG  "capsule  Commonly known as:  NEURONTIN  Take 1 capsule by mouth 2 (Two) Times a Day.     insulin lispro 100 UNIT/ML injection  Commonly known as:  humaLOG  Inject 2-7 Units under the skin 4 (Four) Times a Day With Meals & at   Bedtime.     MELATONIN ER PO     metFORMIN  MG 24 hr tablet  Commonly known as:  GLUCOPHAGE-XR     nystatin 673954 UNIT/GM cream  Commonly known as:  MYCOSTATIN  Apply  topically Every 12 (Twelve) Hours.     oxyCODONE-acetaminophen  MG per tablet  Commonly known as:  PERCOCET  Take 1 tablet by mouth Every 8 (Eight) Hours As Needed for Moderate Pain .     potassium chloride 20 MEQ packet  Commonly known as:  KLOR-CON     rosuvastatin 20 MG tablet  Commonly known as:  CRESTOR     vitamin D 78510 units capsule capsule  Commonly known as:  ERGOCALCIFEROL          ALLERGIES     is allergic to ativan [lorazepam].    FAMILY HISTORY     has no family status information on file.    family history is not on file.    SOCIAL HISTORY      reports that she has quit smoking. She quit after 20.00 years of use. She has never used smokeless tobacco. She reports that she does not drink alcohol or use drugs.    PHYSICAL EXAM     INITIAL VITALS:  height is 152.4 cm (60\") and weight is 56.7 kg (125 lb). Her temporal artery  temperature is 99.1 °F (37.3 °C). Her blood pressure is 120/53 and her pulse is 76. Her respiration is 14 and oxygen saturation is 98%.    Physical Exam    CONSTITUTIONAL: Well developed, well nourished, not diaphoretic nor distressed  HENT: Normocephalic, atraumatic, oropharynx clear and moist  EYES: PERRL, does not follow commands for extraocular evaluation, no discharge, no scleral icterus  NECK: ROM normal, supple, no tracheal deviation nor JVD, no stridor  CARDIOVASCULAR: Normal rate and rhythm, heart sounds normal, no rub no gallop, intact distal pulses, normal cap refill  PULMONARY: Normal effort and breath sounds, no distress, no wheezes, rhonchi or rales, no chest " tenderness  ABDOMINAL: Soft, nontender, no guarding, no mass, no rebound, no hernia  GENITOURINARY/ANORECTAL: deferred  MUSCULOSKELETAL: ROM normal, no tenderness nor deformity, no edema  NEUROLOGICAL: Patient arousable no other neurological exam able to be obtained.  However patient does move all 4 extremities equally.  SKIN: Warm, dry, no erythema, bilateral erythema lower extremities right worse than left.  No lymphangitis however there are obvious signs of cellulitis  PSYCH: Mood and affect normal, behavior normal, thought content and judgement normal.      DIFFERENTIAL DIAGNOSIS:       DIAGNOSTIC RESULTS     EKG: All EKG's are interpreted by the Emergency Department Physician who either signs or Co-signs this chart in the absence of a cardiologist.      RADIOLOGY: non-plain film images(s) such as CT, Ultrasound and MRI are read by the radiologist.  Plain radiographic images are visualized and preliminarily interpreted by the emergency physician unless otherwise stated below.  Ct Head Without Contrast    Result Date: 5/17/2018  Narrative: EXAMINATION: CT HEAD WO CONTRAST-   5/17/2018 3:09 PM CDT  HISTORY: Altered mental status.  In order to have a CT radiation dose as low as reasonably achievable Automated Exposure Control was utilized for adjustment of the mA and/or KV according to patient size.  DLP in mGycm= 759.  Noncontrast head CT compared with 12/28/2017.  Moderate central atrophy and small vessel disease. Mild ventricular enlargement.  No hemorrhage or mass effect.  Clear paranasal sinuses.  Summary: 1. Stable chronic change with no acute intracranial abnormality.          This report was finalized on 05/17/2018 15:39 by Dr. Arley Patel MD.    Xr Chest 1 View    Result Date: 5/17/2018  Narrative: EXAMINATION: XR CHEST 1 VW-  5/17/2018 1:25 PM CDT  HISTORY: Altered mental status.  One view chest x-ray compared with 12/30/2017.  Cardiomegaly with aortic arch calcification. Prosthetic heart valve.   Chronic lung changes with hyperexpansion.  No sign of pneumonia.  No pneumothorax or heart failure.  Summary: 1. Stable chronic change. This report was finalized on 05/17/2018 13:59 by Dr. Arley Patel MD.    Us Venous Doppler Lower Extremity Bilateral (duplex)    Result Date: 5/17/2018  Narrative: History: Swelling      Impression: Impression: There is no evidence of deep venous thrombosis or superficial thrombophlebitis of right or left lower extremities.  Comments: Bilateral lower extremity venous duplex exam was performed using color Doppler flow, Doppler waveform analysis, and grayscale imaging, with and without compression. There is no evidence of deep venous thrombosis in the common femoral, superficial femoral, popliteal, peroneal, anterior tibial, and posterior tibial veins bilaterally. No thrombus is identified in the saphenofemoral junctions and greater saphenous veins bilaterally.   This report was finalized on 05/17/2018 17:13 by Dr. Jaya Sandoval MD.    Ct Angiogram Chest With Contrast    Result Date: 5/17/2018  Narrative: EXAMINATION: CT ANGIOGRAM CHEST W CONTRAST-   5/17/2018 3:09 PM CDT  HISTORY: elevated dimer, ams, fever  In order to have a CT radiation dose as low as reasonably achievable Automated Exposure Control was utilized for adjustment of the mA and/or KV according to patient size.  DLP in mGycm= 241.  CT angiography protocol. CT imaging with bolus IV contrast injection. Under concurrent supervision axial, sagittal, coronal, and three-dimensional data sets were constructed.  Cardiomegaly. No thoracic aortic aneurysm or dissection.  Symmetric well opacified pulmonary arteries. No pulmonary embolism.  Chronic lung changes with basilar atelectasis. Trace amounts of pleural fluid.  No pneumonia or pneumothorax.  Chronic appearing lower thoracic compression fractures.  Summary: 1. No pulmonary embolism. 2. Chronic lung changes with no acute abnormality.            This report was finalized  on 05/17/2018 15:45 by Dr. Arley Patel MD.             LABS:   Lab Results (last 24 hours)     Procedure Component Value Units Date/Time    Blood Culture - Blood, [[id number]] Collected:  05/17/18 1310    Specimen:  Blood from Arm, Left Updated:  05/17/18 1344    Blood Culture - Blood, [[id number]] Collected:  05/17/18 1314    Specimen:  Blood from Arm, Right Updated:  05/17/18 1344    CBC & Differential [[id number]] Collected:  05/17/18 1318    Specimen:  Blood Updated:  05/17/18 1328    Narrative:       The following orders were created for panel order CBC & Differential.  Procedure                               Abnormality         Status                     ---------                               -----------         ------                     CBC Auto Differential[[id number]]        Abnormal            Final result                 Please view results for these tests on the individual orders.    CBC Auto Differential [[id number]]  (Abnormal) Collected:  05/17/18 1318    Specimen:  Blood Updated:  05/17/18 1328     WBC 4.53 (L) 10*3/mm3      RBC 4.28 10*6/mm3      Hemoglobin 12.1 g/dL      Hematocrit 37.5 %      MCV 87.6 fL      MCH 28.3 pg      MCHC 32.3 (L) g/dL      RDW 15.0 %      RDW-SD 47.8 fl      MPV 9.9 fL      Platelets 191 10*3/mm3      Neutrophil % 83.7 (H) %      Lymphocyte % 8.4 (L) %      Monocyte % 5.1 %      Eosinophil % 2.0 %      Basophil % 0.4 %      Immature Grans % 0.4 %      Neutrophils, Absolute 3.79 10*3/mm3      Lymphocytes, Absolute 0.38 (L) 10*3/mm3      Monocytes, Absolute 0.23 10*3/mm3      Eosinophils, Absolute 0.09 10*3/mm3      Basophils, Absolute 0.02 10*3/mm3      Immature Grans, Absolute 0.02 10*3/mm3      nRBC 0.0 /100 WBC     Comprehensive Metabolic Panel [[id number]]  (Abnormal) Collected:  05/17/18 1319    Specimen:  Blood Updated:  05/17/18 1339     Glucose 117 (H) mg/dL      BUN 12 mg/dL      Creatinine 0.65 mg/dL      Sodium 142 mmol/L      Potassium 4.1 mmol/L      Chloride  103 mmol/L      CO2 31.0 mmol/L      Calcium 9.2 mg/dL      Total Protein 5.9 (L) g/dL      Albumin 3.40 (L) g/dL      ALT (SGPT) 28 U/L      AST (SGOT) 30 U/L      Alkaline Phosphatase 52 U/L      Total Bilirubin 0.4 mg/dL      eGFR Non African Amer 87 mL/min/1.73      Globulin 2.5 gm/dL      A/G Ratio 1.4 g/dL      BUN/Creatinine Ratio 18.5     Anion Gap 8.0 mmol/L     Narrative:       The MDRD GFR formula is only valid for adults with stable renal function between ages 18 and 70.    Protime-INR [[id number]]  (Normal) Collected:  05/17/18 1319    Specimen:  Blood Updated:  05/17/18 1341     Protime 12.7 Seconds      INR 0.93    aPTT [[id number]]  (Normal) Collected:  05/17/18 1319    Specimen:  Blood Updated:  05/17/18 1341     PTT 32.0 seconds     BNP [[id number]]  (Abnormal) Collected:  05/17/18 1319    Specimen:  Blood Updated:  05/17/18 1351     proBNP 2,170.0 (H) pg/mL     D-dimer, Quantitative [[id number]]  (Abnormal) Collected:  05/17/18 1319    Specimen:  Blood Updated:  05/17/18 1341     D-Dimer, Quantitative 0.88 (H) mg/L (FEU)     Narrative:       Reference Range is 0-0.50 mg/L FEU. However, results <0.50 mg/L FEU tends to rule out DVT or PE. Results >0.50 mg/L FEU are not useful in predicting absence or presence of DVT or PE.    Troponin [[id number]]  (Abnormal) Collected:  05/17/18 1319    Specimen:  Blood Updated:  05/17/18 1351     Troponin I 0.038 (H) ng/mL     Procalcitonin [[id number]]  (Normal) Collected:  05/17/18 1319    Specimen:  Blood Updated:  05/17/18 1409     Procalcitonin <0.25 ng/mL     Narrative:       SIRS, sepsis, severe sepsis, and septic shock are categorized according to the criteria of the consensus conference of the American College of Chest Physicians/Society of Critical Care Medicine.    PCT < 0.5 ng/mL     Systemic infection (sepsis) is not likely.    PCT >0.5 and < 2.0 ng/mL Systemic infection (sepsis) is possible, but other conditions are known to elevate PCT as well.    PCT >  2.0 ng/mL     Systemic infection (sepsis) is likely, unless other causes are known.      PCT > 10.0 ng/mL    Important systemic inflammatory response, almost exclusively due to severe bacterial sepsis or septic shock.    PCT values of < 0.5 ng/mL do not exclude an infection, because localized infections (without systemic signs) may be associated with such low concentrations, or a systemic infection in its initial stages (<6 hours).  Increased PCT can occur without infection.  PCT concentrations between 0.5 and 2.0 ng/mL should be interpreted taking into account the patients history.  It is recommended to retest PCT within 6-24 hours if any concentrations < 2.0 ng/mL are obtained.    Lactic Acid, Plasma [[id number]]  (Normal) Collected:  05/17/18 1319    Specimen:  Blood Updated:  05/17/18 1340     Lactate 1.0 mmol/L     Urinalysis With / Microscopic If Indicated - Urine, Catheter [[id number]]  (Abnormal) Collected:  05/17/18 1327    Specimen:  Urine from Urine, Catheter Updated:  05/17/18 1340     Color, UA Yellow     Appearance, UA Turbid (A)     pH, UA 8.5 (H)     Specific Gravity, UA 1.019     Glucose, UA Negative     Ketones, UA Negative     Bilirubin, UA Negative     Blood, UA Negative     Protein, UA 30 mg/dL (1+) (A)     Leuk Esterase, UA Negative     Nitrite, UA Negative     Urobilinogen, UA 1.0 E.U./dL    Urinalysis, Microscopic Only - Urine, Clean Catch [[id number]]  (Abnormal) Collected:  05/17/18 1327    Specimen:  Urine from Urine, Catheter Updated:  05/17/18 1340     RBC, UA 3-5 (A) /HPF      WBC, UA 0-2 (A) /HPF      Bacteria, UA None Seen /HPF      Squamous Epithelial Cells, UA 0-2 /HPF      Hyaline Casts, UA 0-2 /LPF      Methodology Automated Microscopy    Blood Gas, Arterial [[id number]]  (Abnormal) Collected:  05/17/18 1332    Specimen:  Arterial Blood Updated:  05/17/18 1338     Site Right Radial     Doug's Test Positive     pH, Arterial 7.499 (H) pH units      pCO2, Arterial 33.6 (L) mm Hg       "pO2, Arterial 70.7 (L) mm Hg      HCO3, Arterial 26.1 (H) mmol/L      Base Excess, Arterial 3.1 (H) mmol/L      O2 Saturation, Arterial 95.7 %      Temperature 37.0 C      Barometric Pressure for Blood Gas 748 mmHg      Modality Room Air     Ventilator Mode NA     Collected by [id number]          EMERGENCY DEPARTMENT COURSE:   Vitals:    Vitals:    05/17/18 1255 05/17/18 1548   BP: 120/44 120/53   Pulse: 75 76   Resp: 14    Temp: 99.1 °F (37.3 °C)    TempSrc: Temporal Artery     SpO2: 96% 98%   Weight: 56.7 kg (125 lb)    Height: 152.4 cm (60\")        The patient was given the following medications:  Medications   sodium chloride 0.9 % bolus 1,000 mL (0 mL Intravenous Stopped 5/17/18 1453)   cefTRIAXone (ROCEPHIN) 1 g/10mL IV PUSH syringe (1 g Intravenous Given 5/17/18 1547)   iopamidol (ISOVUE-300) 61 % injection 100 mL (100 mL Intravenous Given 5/17/18 1515)            CRITICAL CARE:  none    CONSULTS:  none    PROCEDURES:  None    MDM    FINAL IMPRESSION      1. Cellulitis of lower extremity, unspecified laterality    2. Fever, unspecified fever cause    3. Altered mental status, unspecified altered mental status type          DISPOSITION/PLAN   Patient admitted to the hospitalist service in stable condition.      PATIENT REFERRED TO:  No follow-up provider specified.    DISCHARGE MEDICATIONS:     Medication List      ASK your doctor about these medications    acetaminophen 325 MG tablet  Commonly known as:  TYLENOL  Take 2 tablets by mouth Every 4 (Four) Hours As Needed for Mild Pain .     alendronate 70 MG tablet  Commonly known as:  FOSAMAX     aspirin 81 MG EC tablet     bisacodyl 5 MG EC tablet  Commonly known as:  DULCOLAX  Take 1 tablet by mouth Daily As Needed for Constipation.     donepezil 10 MG tablet  Commonly known as:  ARICEPT     furosemide 40 MG tablet  Commonly known as:  LASIX     gabapentin 300 MG capsule  Commonly known as:  NEURONTIN  Take 1 capsule by mouth 2 (Two) Times a Day.     insulin " lispro 100 UNIT/ML injection  Commonly known as:  humaLOG  Inject 2-7 Units under the skin 4 (Four) Times a Day With Meals & at   Bedtime.     MELATONIN ER PO     metFORMIN  MG 24 hr tablet  Commonly known as:  GLUCOPHAGE-XR     nystatin 440662 UNIT/GM cream  Commonly known as:  MYCOSTATIN  Apply  topically Every 12 (Twelve) Hours.     oxyCODONE-acetaminophen  MG per tablet  Commonly known as:  PERCOCET  Take 1 tablet by mouth Every 8 (Eight) Hours As Needed for Moderate Pain .     potassium chloride 20 MEQ packet  Commonly known as:  KLOR-CON     rosuvastatin 20 MG tablet  Commonly known as:  CRESTOR     vitamin D 01212 units capsule capsule  Commonly known as:  ERGOCALCIFEROL          (Please note that portions of this note were completed with a voice recognition program.)    Dee Dee Arguelles, DO Dee Dee Arguelles DO  05/17/18 6599

## 2018-05-18 ENCOUNTER — APPOINTMENT (OUTPATIENT)
Dept: MRI IMAGING | Facility: HOSPITAL | Age: 83
End: 2018-05-18

## 2018-05-18 ENCOUNTER — APPOINTMENT (OUTPATIENT)
Dept: ULTRASOUND IMAGING | Facility: HOSPITAL | Age: 83
End: 2018-05-18

## 2018-05-18 ENCOUNTER — APPOINTMENT (OUTPATIENT)
Dept: CARDIOLOGY | Facility: HOSPITAL | Age: 83
End: 2018-05-18

## 2018-05-18 VITALS
BODY MASS INDEX: 24.54 KG/M2 | HEART RATE: 74 BPM | TEMPERATURE: 97.6 F | WEIGHT: 125 LBS | OXYGEN SATURATION: 94 % | DIASTOLIC BLOOD PRESSURE: 33 MMHG | HEIGHT: 60 IN | RESPIRATION RATE: 16 BRPM | SYSTOLIC BLOOD PRESSURE: 99 MMHG

## 2018-05-18 LAB
ANION GAP SERPL CALCULATED.3IONS-SCNC: 10 MMOL/L (ref 4–13)
ARTICHOKE IGE QN: 50 MG/DL (ref 0–99)
BH CV ECHO MEAS - AO MAX PG: 26.6 MMHG
BH CV ECHO MEAS - AO MEAN PG: 12 MMHG
BH CV ECHO MEAS - AO ROOT AREA (BSA CORRECTED): 1.4
BH CV ECHO MEAS - AO ROOT AREA: 3.5 CM^2
BH CV ECHO MEAS - AO ROOT DIAM: 2.1 CM
BH CV ECHO MEAS - AO V2 MAX: 258 CM/SEC
BH CV ECHO MEAS - AO V2 MEAN: 156 CM/SEC
BH CV ECHO MEAS - AO V2 VTI: 47.1 CM
BH CV ECHO MEAS - BSA(HAYCOCK): 1.6 M^2
BH CV ECHO MEAS - BSA: 1.5 M^2
BH CV ECHO MEAS - BZI_BMI: 24.4 KILOGRAMS/M^2
BH CV ECHO MEAS - BZI_METRIC_HEIGHT: 152.4 CM
BH CV ECHO MEAS - BZI_METRIC_WEIGHT: 56.7 KG
BH CV ECHO MEAS - EDV(CUBED): 58.9 ML
BH CV ECHO MEAS - EDV(TEICH): 65.5 ML
BH CV ECHO MEAS - EF(CUBED): 70.5 %
BH CV ECHO MEAS - EF(TEICH): 62.8 %
BH CV ECHO MEAS - ESV(CUBED): 17.4 ML
BH CV ECHO MEAS - ESV(TEICH): 24.4 ML
BH CV ECHO MEAS - FS: 33.4 %
BH CV ECHO MEAS - IVS/LVPW: 1.1
BH CV ECHO MEAS - IVSD: 1.3 CM
BH CV ECHO MEAS - LA DIMENSION: 3.2 CM
BH CV ECHO MEAS - LA/AO: 1.5
BH CV ECHO MEAS - LV MASS(C)D: 177 GRAMS
BH CV ECHO MEAS - LV MASS(C)DI: 115.8 GRAMS/M^2
BH CV ECHO MEAS - LVIDD: 3.9 CM
BH CV ECHO MEAS - LVIDS: 2.6 CM
BH CV ECHO MEAS - LVOT AREA (M): 2 CM^2
BH CV ECHO MEAS - LVOT AREA: 2 CM^2
BH CV ECHO MEAS - LVOT DIAM: 1.6 CM
BH CV ECHO MEAS - LVPWD: 1.2 CM
BH CV ECHO MEAS - MV A MAX VEL: 137 CM/SEC
BH CV ECHO MEAS - MV DEC TIME: 0.37 SEC
BH CV ECHO MEAS - MV E MAX VEL: 138 CM/SEC
BH CV ECHO MEAS - MV E/A: 1
BH CV ECHO MEAS - PA MAX PG: 3.7 MMHG
BH CV ECHO MEAS - PA V2 MAX: 96.5 CM/SEC
BH CV ECHO MEAS - PI END-D VEL: 134 CM/SEC
BH CV ECHO MEAS - RAP SYSTOLE: 5 MMHG
BH CV ECHO MEAS - RVSP: 48.6 MMHG
BH CV ECHO MEAS - SI(AO): 106.7 ML/M^2
BH CV ECHO MEAS - SI(CUBED): 27.1 ML/M^2
BH CV ECHO MEAS - SI(TEICH): 26.9 ML/M^2
BH CV ECHO MEAS - SV(AO): 163.1 ML
BH CV ECHO MEAS - SV(CUBED): 41.5 ML
BH CV ECHO MEAS - SV(TEICH): 41.1 ML
BH CV ECHO MEAS - TR MAX VEL: 330 CM/SEC
BUN BLD-MCNC: 9 MG/DL (ref 5–21)
BUN/CREAT SERPL: 17.6 (ref 7–25)
CALCIUM SPEC-SCNC: 8.1 MG/DL (ref 8.4–10.4)
CHLORIDE SERPL-SCNC: 109 MMOL/L (ref 98–110)
CHOLEST SERPL-MCNC: 122 MG/DL (ref 130–200)
CO2 SERPL-SCNC: 24 MMOL/L (ref 24–31)
CREAT BLD-MCNC: 0.51 MG/DL (ref 0.5–1.4)
DEPRECATED RDW RBC AUTO: 47.8 FL (ref 40–54)
ERYTHROCYTE [DISTWIDTH] IN BLOOD BY AUTOMATED COUNT: 15.1 % (ref 12–15)
GFR SERPL CREATININE-BSD FRML MDRD: 116 ML/MIN/1.73
GLUCOSE BLD-MCNC: 108 MG/DL (ref 70–100)
GLUCOSE BLDC GLUCOMTR-MCNC: 114 MG/DL (ref 70–130)
HBA1C MFR BLD: 5.6 %
HCT VFR BLD AUTO: 32.8 % (ref 37–47)
HDLC SERPL-MCNC: 49 MG/DL
HGB BLD-MCNC: 10.7 G/DL (ref 12–16)
LDLC/HDLC SERPL: 0.96 {RATIO}
LEFT ATRIUM VOLUME INDEX: 27.2 ML/M2
LEFT ATRIUM VOLUME: 41.6 CM3
MAXIMAL PREDICTED HEART RATE: 139 BPM
MCH RBC QN AUTO: 28.4 PG (ref 28–32)
MCHC RBC AUTO-ENTMCNC: 32.6 G/DL (ref 33–36)
MCV RBC AUTO: 87 FL (ref 82–98)
PLATELET # BLD AUTO: 122 10*3/MM3 (ref 130–400)
PMV BLD AUTO: 10.5 FL (ref 6–12)
POTASSIUM BLD-SCNC: 3.4 MMOL/L (ref 3.5–5.3)
RBC # BLD AUTO: 3.77 10*6/MM3 (ref 4.2–5.4)
SODIUM BLD-SCNC: 143 MMOL/L (ref 135–145)
STRESS TARGET HR: 118 BPM
TRIGL SERPL-MCNC: 131 MG/DL (ref 0–149)
TROPONIN I SERPL-MCNC: 0.03 NG/ML (ref 0–0.03)
TSH SERPL DL<=0.05 MIU/L-ACNC: 1.13 MIU/ML (ref 0.47–4.68)
WBC NRBC COR # BLD: 2.87 10*3/MM3 (ref 4.8–10.8)

## 2018-05-18 PROCEDURE — 85027 COMPLETE CBC AUTOMATED: CPT | Performed by: NURSE PRACTITIONER

## 2018-05-18 PROCEDURE — 25810000003 SODIUM CHLORIDE 0.9 % WITH KCL 20 MEQ 20-0.9 MEQ/L-% SOLUTION: Performed by: FAMILY MEDICINE

## 2018-05-18 PROCEDURE — 93306 TTE W/DOPPLER COMPLETE: CPT

## 2018-05-18 PROCEDURE — G8988 SELF CARE GOAL STATUS: HCPCS | Performed by: OCCUPATIONAL THERAPIST

## 2018-05-18 PROCEDURE — 97166 OT EVAL MOD COMPLEX 45 MIN: CPT | Performed by: OCCUPATIONAL THERAPIST

## 2018-05-18 PROCEDURE — 84443 ASSAY THYROID STIM HORMONE: CPT | Performed by: NURSE PRACTITIONER

## 2018-05-18 PROCEDURE — 93880 EXTRACRANIAL BILAT STUDY: CPT | Performed by: SURGERY

## 2018-05-18 PROCEDURE — G8987 SELF CARE CURRENT STATUS: HCPCS | Performed by: OCCUPATIONAL THERAPIST

## 2018-05-18 PROCEDURE — G8996 SWALLOW CURRENT STATUS: HCPCS

## 2018-05-18 PROCEDURE — 83036 HEMOGLOBIN GLYCOSYLATED A1C: CPT | Performed by: NURSE PRACTITIONER

## 2018-05-18 PROCEDURE — G8998 SWALLOW D/C STATUS: HCPCS

## 2018-05-18 PROCEDURE — 92610 EVALUATE SWALLOWING FUNCTION: CPT

## 2018-05-18 PROCEDURE — 70551 MRI BRAIN STEM W/O DYE: CPT

## 2018-05-18 PROCEDURE — G0378 HOSPITAL OBSERVATION PER HR: HCPCS

## 2018-05-18 PROCEDURE — 80048 BASIC METABOLIC PNL TOTAL CA: CPT | Performed by: NURSE PRACTITIONER

## 2018-05-18 PROCEDURE — 84484 ASSAY OF TROPONIN QUANT: CPT | Performed by: NURSE PRACTITIONER

## 2018-05-18 PROCEDURE — 80061 LIPID PANEL: CPT | Performed by: NURSE PRACTITIONER

## 2018-05-18 PROCEDURE — 96375 TX/PRO/DX INJ NEW DRUG ADDON: CPT

## 2018-05-18 PROCEDURE — 96361 HYDRATE IV INFUSION ADD-ON: CPT

## 2018-05-18 PROCEDURE — 93306 TTE W/DOPPLER COMPLETE: CPT | Performed by: INTERNAL MEDICINE

## 2018-05-18 PROCEDURE — G8997 SWALLOW GOAL STATUS: HCPCS

## 2018-05-18 PROCEDURE — 93880 EXTRACRANIAL BILAT STUDY: CPT

## 2018-05-18 PROCEDURE — 96366 THER/PROPH/DIAG IV INF ADDON: CPT

## 2018-05-18 PROCEDURE — 82962 GLUCOSE BLOOD TEST: CPT

## 2018-05-18 RX ORDER — POTASSIUM CHLORIDE 750 MG/1
40 CAPSULE, EXTENDED RELEASE ORAL ONCE
Status: COMPLETED | OUTPATIENT
Start: 2018-05-18 | End: 2018-05-18

## 2018-05-18 RX ORDER — FAMOTIDINE 10 MG/ML
20 INJECTION, SOLUTION INTRAVENOUS 2 TIMES DAILY
Status: DISCONTINUED | OUTPATIENT
Start: 2018-05-18 | End: 2018-05-18

## 2018-05-18 RX ORDER — FAMOTIDINE 10 MG/ML
20 INJECTION, SOLUTION INTRAVENOUS DAILY
Status: DISCONTINUED | OUTPATIENT
Start: 2018-05-19 | End: 2018-05-18 | Stop reason: HOSPADM

## 2018-05-18 RX ORDER — SODIUM CHLORIDE AND POTASSIUM CHLORIDE 150; 900 MG/100ML; MG/100ML
75 INJECTION, SOLUTION INTRAVENOUS CONTINUOUS
Status: DISCONTINUED | OUTPATIENT
Start: 2018-05-18 | End: 2018-05-18 | Stop reason: HOSPADM

## 2018-05-18 RX ORDER — ONDANSETRON 4 MG/1
4 TABLET, FILM COATED ORAL EVERY 6 HOURS PRN
Qty: 2 TABLET
Start: 2018-05-18

## 2018-05-18 RX ADMIN — CLINDAMYCIN PHOSPHATE 600 MG: 12 INJECTION, SOLUTION INTRAVENOUS at 10:03

## 2018-05-18 RX ADMIN — CLINDAMYCIN PHOSPHATE 600 MG: 12 INJECTION, SOLUTION INTRAVENOUS at 02:33

## 2018-05-18 RX ADMIN — SODIUM CHLORIDE 100 ML/HR: 9 INJECTION, SOLUTION INTRAVENOUS at 06:07

## 2018-05-18 RX ADMIN — POTASSIUM CHLORIDE AND SODIUM CHLORIDE 75 ML/HR: 900; 150 INJECTION, SOLUTION INTRAVENOUS at 13:17

## 2018-05-18 RX ADMIN — POTASSIUM CHLORIDE 40 MEQ: 750 CAPSULE, EXTENDED RELEASE ORAL at 11:15

## 2018-05-18 RX ADMIN — FAMOTIDINE 20 MG: 10 INJECTION, SOLUTION INTRAVENOUS at 11:15

## 2018-05-18 NOTE — PROGRESS NOTES
Discharge Planning Assessment  [facility]     Patient Name: Marina Hyman  MRN: [id number]  Today's Date: 5/18/2018    Admit Date: 5/17/2018          Discharge Needs Assessment     Row Name 05/18/18 1109       Living Environment    Lives With facility resident    Current Living Arrangements independent/assisted living facility    Provides Primary Care For no one    Family Caregiver if Needed child(flip), adult    Quality of Family Relationships supportive;helpful;involved    Able to Return to Prior Arrangements no       Resource/Environmental Concerns    Resource/Environmental Concerns none       Transition Planning    Patient/Family Anticipates Transition to other (see comments)    Patient/Family Anticipated Services at Transition mental health services    Transportation Anticipated family or friend will provide       Discharge Needs Assessment    Readmission Within the Last 30 Days no previous admission in last 30 days    Concerns to be Addressed mental health    Equipment Currently Used at Home bath bench;walker, rolling    Anticipated Changes Related to Illness inability to care for self    Discharge Facility/Level of Care Needs psychiatric facility    Offered/Gave Vendor List no    Discharge Coordination/Progress Pt is from [facility]. Spoke with pt's daughter and she says pt already has a bed at [facility]. She was actually taking pt there yesterday but realized she was sick and needed to be in the hospital so she brought her here. She checked with Stacey at [facility] and pt does still have the bed. If pt is d/c'ed today, the daughter will take her there.             Discharge Plan    No documentation.       Destination     No service coordination in this encounter.      Durable Medical Equipment     No service coordination in this encounter.      Dialysis/Infusion     No service coordination in this encounter.      Home Medical Care     No service coordination  in this encounter.      Social Care     No service coordination in this encounter.                Demographic Summary    No documentation.           Functional Status    No documentation.           Psychosocial    No documentation.           Abuse/Neglect    No documentation.           Legal    No documentation.           Substance Abuse    No documentation.           Patient Forms    No documentation.         DADA Tomlin

## 2018-05-18 NOTE — PLAN OF CARE
Problem: Patient Care Overview  Goal: Plan of Care Review  Outcome: Ongoing (interventions implemented as appropriate)   05/18/18 0520   Coping/Psychosocial   Plan of Care Reviewed With patient   Plan of Care Review   Progress no change   OTHER   Outcome Summary Pt denies pain. She is oreinted to person only. Pt is uncooperative, refused labs earlier in the shift, will not answer questions. NPO until speech evaluates today. Wound nurse consult for bilateral leg abrasions. Lower extremity edema.        Goal: Individualization and Mutuality  Outcome: Ongoing (interventions implemented as appropriate)    Goal: Discharge Needs Assessment  Outcome: Ongoing (interventions implemented as appropriate)    Goal: Interprofessional Rounds/Family Conf  Outcome: Ongoing (interventions implemented as appropriate)      Problem: Fall Risk (Adult)  Goal: Identify Related Risk Factors and Signs and Symptoms  Outcome: Ongoing (interventions implemented as appropriate)    Goal: Absence of Fall  Outcome: Ongoing (interventions implemented as appropriate)      Problem: Skin Injury Risk (Adult)  Goal: Identify Related Risk Factors and Signs and Symptoms  Outcome: Ongoing (interventions implemented as appropriate)    Goal: Skin Health and Integrity  Outcome: Ongoing (interventions implemented as appropriate)      Problem: Skin and Soft Tissue Infection (Adult)  Goal: Signs and Symptoms of Listed Potential Problems Will be Absent, Minimized or Managed (Skin and Soft Tissue Infection)  Outcome: Ongoing (interventions implemented as appropriate)

## 2018-05-18 NOTE — THERAPY EVALUATION
Acute Care - Speech Language Pathology   Swallow Initial Evaluation Psychiatric     Patient Name: Marina Hyman  : 1935  MRN: [id number]  Today's Date: 2018  Onset of Illness/Injury or Date of Surgery: 18     Referring Physician: CARLEY Flores      Admit Date: 2018       SPEECH-LANGUAGE PATHOLOGY EVALUATION - SWALLOW    Subjective: The patient was seen on this date for a Clinical Swallow evaluation.  Patient was alert and cooperative.  Significant history: Dementia  Objective: Textures given included thin liquid, puree consistency and regular consistency.  Assessment: Pt was presented pureed, thin, and regular solid consistencies only, as the pt required mod to max cues and encouragement to participate. Pt was noted to have odd behaviors when attempting to accept bolus from spoon, with small boluses consumed, with inconsistency in labial closure around spoon. Pt c/o dislike for specific food item being trialed (applesauce); therefore, ST changed to pudding thick orange juice, as the daughter felt the pt would be more receptive to such taste. Pt continued to c/o of dislike in taste, with facial grimacing noted, only consuming small boluses. Therefore, ST changed to thin liquids, consumed via straw. Pt was noted to have mild oral holding of the bolus, felt to be exacerbated by pt's decreased interest in PO intake. Difficulty ensuring that pt had completed a full swallow x1. With regular solid, pt was noted to have mild difficulty biting from cracker, with decreased rotary chew and anterior mastication of the bolus. No noted oral residue post swallow. No overt s/s of aspiration. Spoke with daughter, Kelsieabdullahi: possible concerns. Daughter denied concern with dysphagia at this time. Cannot fully r/o aspiration at this time, yet feel swallow fx is at baseline at this time.   SLP Findings:  Patient presents with functional swallow, without esophageal component.   Recommendations: Diet  "Textures: thin liquid, mechanical soft consistency food.  Medications should be taken whole with thin liquids. May have water and ice between meals after oral care, under staff or family supervision and with the recommended strategies for safe swallowing.   Recommended Strategies: Upright for PO, small bites and sips and supervision with all PO. Oral care before breakfast, after all meals and PRN.    Dysphagia therapy is recommended. Rationale: See above. ST to monitor to ensure diet toleration. Thanks! Kellen Almonte, CCC-SLP 5/18/2018 12:28 PM    Visit Dx:     ICD-10-CM ICD-9-CM   1. Cellulitis of lower extremity, unspecified laterality L03.119 682.6   2. Fever, unspecified fever cause R50.9 780.60   3. Altered mental status, unspecified altered mental status type R41.82 780.97   4. Impaired mobility and ADLs Z74.09 799.89   5. Oropharyngeal dysphagia R13.12 787.22     Patient Active Problem List   Diagnosis   • ELIECER (acute kidney injury)   • Cellulitis of lower extremity     Past Medical History:   Diagnosis Date   • CHF (congestive heart failure)    • Diabetes mellitus      Past Surgical History:   Procedure Laterality Date   • AORTIC VALVE REPAIR/REPLACEMENT            SWALLOW EVALUATION (last 72 hours)      SLP Adult Swallow Evaluation     Row Name 05/18/18 1030                   Rehab Evaluation    Document Type evaluation  -TM        Subjective Information complains of   \"feeling cold\"  -TM        Patient Observations poorly cooperative  -TM        Patient/Family Observations Daughter, Kelsie, present at time of eval.  -TM        Patient Effort fair  -TM        Comment Required mod to max verbal cues and encouragement to participate.   -TM           General Information    Patient Profile Reviewed yes  -TM        Pertinent History Of Current Problem CXR and CT head on 05/17/2018 showed stable chronic changes   -TM        Current Method of Nutrition NPO  -TM        Precautions/Limitations, Vision WFL with " corrective lenses  -TM        Precautions/Limitations, Hearing WFL  -TM        Prior Level of Function-Communication cognitive-linguistic impairment;other (see comments)   End stage Dementia  -TM        Prior Level of Function-Swallowing dietary restrictions (e.g. consistent carb, low salt, etc.);other (see comments)   Diabetic   -TM        Plans/Goals Discussed with family;other (see comments)   Daughter and RNMary  -TM        Barriers to Rehab cognitive status  -TM        Patient's Goals for Discharge patient did not state  -TM        Family Goals for Discharge patient able to return to PO diet  -TM           Pain Assessment    Additional Documentation Pain Scale: FACES Pre/Post-Treatment (Group)  -TM           Pain Scale: FACES Pre/Post-Treatment    Pain: FACES Scale, Pretreatment 0-->no hurt  -TM        Pain: FACES Scale, Post-Treatment 0-->no hurt  -TM           Oral Motor and Function    Dentition Assessment lower dentures/partial in place;other (see comments)   Daughter stated top dentures were lost at [facility].  -TM        Secretion Management WNL/WFL  -TM        Mucosal Quality moist, healthy  -TM           Oral Musculature and Cranial Nerve Assessment    Oral Motor General Assessment generalized oral motor weakness  -TM        Mandibular Impairment Detail, Cranial Nerve V (Trigeminal) reduced mandibular ROM;reduced strength bilaterally  -TM        Oral Labial or Buccal Impairment, Detail, Cranial Nerve VII (Facial): reduced ROM;left labial droop;other (see comments)   Mild L labial droop  -TM        Lingual Impairment, Detail. Cranial Nerves IX, XII (Glossopharyngeal and Hypoglossal) reduced strength  -TM           General Eating/Swallowing Observations    Respiratory Support Currently in Use room air  -TM        Eating/Swallowing Skills fed by SLP  -TM        Positioning During Eating upright 90 degree;upright in bed  -TM        Utensils Used spoon;straw  -TM           Respiratory    Respiratory  Status WFL  -TM           Clinical Swallow Eval    Oral Prep Phase impaired  -TM        Oral Transit impaired  -TM        Oral Residue WFL  -TM        Pharyngeal Phase no overt signs/symptoms of pharyngeal impairment  -TM        Esophageal Phase unremarkable  -TM        Clinical Swallow Evaluation Summary Bedside swallow eval completed. Pt was presented pureed, thin, and regular solid consistencies only, as the pt required mod to max cues and encouragement to participate. Pt was noted to have odd behaviors when attempting to accept bolus from spoon, with small boluses consumed, with inconsistency in labial closure around spoon. Pt c/o dislike for specific food item being trialed (applesauce); therefore, ST changed to pudding thick orange juice, as the daughter felt the pt would be more receptive to such taste. Pt continued to c/o of dislike in taste, with facial grimacing noted, only consuming small boluses. Therefore, ST changed to thin liquids, consumed via straw. Pt was noted to have mild oral holding of the bolus, felt to be exacerbated by pt's decreased interest in PO intake. Difficulty ensuring that pt had completed a full swallow x1. With regular solid, pt was noted to have mild difficulty biting from cracker, with decreased rotary chew and anterior mastication of the bolus. No noted oral residue post swallow. No overt s/s of aspiration. Spoke with daughter, Kelsie, re: possible concerns. Daughter denied concern with dysphagia at this time. Cannot fully r/o aspiration at this time, yet feel swallow fx is at baseline at this time.   -TM           Oral Prep Concerns    Oral Prep Concerns increased prep time;poor rotary chew;reduced lip opening;incomplete or weak lip closure around spoon  -TM        Poor Rotary Chew regular consistencies  -TM        Reduced Lip Opening pudding  -TM        Incomplete or Weak Lip Closure Around Spoon pudding  -TM        Increased Prep Time regular consistencies  -TM            Oral Transit Concerns    Oral Transit Concerns delayed initiation of bolus transit;increased oral transit time  -TM        Delayed Intiation of Bolus Transit thin  -TM        Increased Oral Transit Time thin  -TM           Clinical Impression    SLP Swallowing Diagnosis functional oral phase;functional pharyngeal phase  -TM        Functional Impact risk of aspiration/pneumonia  -TM        Rehab Potential/Prognosis, Swallowing good, to achieve stated therapy goals  -TM        Criteria for Skilled Therapeutic Interventions Met demonstrates skilled criteria  -TM           Recommendations    Therapy Frequency (Swallow) PRN  -TM        Predicted Duration Therapy Intervention (Days) until discharge  -TM        SLP Diet Recommendation soft textures;thin liquids  -TM        Recommended Precautions and Strategies upright posture during/after eating;small bites of food and sips of liquid  -TM        SLP Rec. for Method of Medication Administration meds whole;with thin liquids  -TM        Monitor for Signs of Aspiration yes;cough;gurgly voice;throat clearing;right lower lobe infiltrates  -TM        Anticipated Dischage Disposition skilled nursing facility  -TM           Swallow Goals (SLP)    Oral Nutrition/Hydration Goal Selection (SLP) oral nutrition/hydration, SLP goal 1  -TM           Oral Nutrition/Hydration Goal 1 (SLP)    Oral Nutrition/Hydration Goal 1, SLP Pt will tolerate LRD without overt s/s of aspiration.  -TM        Time Frame (Oral Nutrition/Hydration Goal 1, SLP) by discharge  -TM        Barriers (Oral Nutrition/Hydration Goal 1, SLP) Cognition, decreased motivation  -TM        Progress/Outcomes (Oral Nutrition/Hydration Goal 1, SLP) goal ongoing  -TM          User Key  (r) = Recorded By, (t) = Taken By, (c) = Cosigned By    Initials Name Effective Dates    TM Kellen Almonte CCC-SLP 08/02/16 -         EDUCATION  The patient has been educated in the following areas:   Dysphagia (Swallowing Impairment).    SLP  Recommendation and Plan  SLP Swallowing Diagnosis: functional oral phase, functional pharyngeal phase  SLP Diet Recommendation: soft textures, thin liquids  Recommended Precautions and Strategies: upright posture during/after eating, small bites of food and sips of liquid     Monitor for Signs of Aspiration: yes, cough, gurgly voice, throat clearing, right lower lobe infiltrates     Criteria for Skilled Therapeutic Interventions Met: demonstrates skilled criteria  Anticipated Dischage Disposition: skilled nursing facility  Rehab Potential/Prognosis, Swallowing: good, to achieve stated therapy goals  Therapy Frequency (Swallow): PRN  Predicted Duration Therapy Intervention (Days): until discharge       Plan of Care Reviewed With: daughter, other (see comments) (Mary XIE)  Plan of Care Review  Plan of Care Reviewed With: daughter, other (see comments) (Mary XIE)  Progress:  (Eval)  Outcome Summary: Bedside swallow eval completed. Pt was presented pureed, thin, and regular solid consistencies only, as the pt required mod to max cues and encouragement to participate. Pt was noted to have odd behaviors when attempting to accept bolus from spoon, with small boluses consumed, with inconsistency in labial closure around spoon. Pt c/o dislike for specific food item being trialed (applesauce); therefore, ST changed to pudding thick orange juice, as the daughter felt the pt would be more receptive to such taste. Pt continued to c/o of dislike in taste, with facial grimacing noted, only consuming small boluses. Therefore, ST changed to thin liquids, consumed via straw. Pt was noted to have mild oral holding of the bolus, felt to be exacerbated by pt's decreased interest in PO intake. Difficulty ensuring that pt had completed a full swallow x1. With regular solid, pt was noted to have mild difficulty biting from cracker, with decreased rotary chew and anterior mastication of the bolus. No noted oral residue post swallow. No  overt s/s of aspiration. Spoke with daughter, Kelsie, re: possible concerns. Daughter denied concern with dysphagia at this time. Cannot fully r/o aspiration at this time, yet feel swallow fx is at baseline at this time. RECOMMENDATIONS: Mechanical soft diet consistency with regular/thin liquid consistency; meds whole with thin liquids; RN to monitor for increased congestion; supervision with PO intake, though daughter stated pt typically feeds self without difficulty; general feeding/aspiration precautions. ST to monitor for ensure diet toleration. Thanks!           SLP GOALS     Row Name 05/18/18 1030             Oral Nutrition/Hydration Goal 1 (SLP)    Oral Nutrition/Hydration Goal 1, SLP Pt will tolerate LRD without overt s/s of aspiration.  -TM      Time Frame (Oral Nutrition/Hydration Goal 1, SLP) by discharge  -TM      Barriers (Oral Nutrition/Hydration Goal 1, SLP) Cognition, decreased motivation  -TM      Progress/Outcomes (Oral Nutrition/Hydration Goal 1, SLP) goal ongoing  -TM        User Key  (r) = Recorded By, (t) = Taken By, (c) = Cosigned By    Initials Name Provider Type     Kellen Almonte CCC-SLP Speech and Language Pathologist             SLP Outcome Measures (last 72 hours)      SLP Outcome Measures     Row Name 05/18/18 1224             SLP Outcome Measures    Outcome Measure Used? Adult NOMS  -TM         FCM Scores    FCM Chosen Swallowing  -TM      Swallowing FCM Score 6  -TM        User Key  (r) = Recorded By, (t) = Taken By, (c) = Cosigned By    Initials Name Effective Dates     Kellen Almonte CCC-SLP 08/02/16 -            Time Calculation:         Time Calculation- SLP     Row Name 05/18/18 1224             Time Calculation- SLP    SLP Start Time 1030  -      SLP Stop Time 1145  -TM      SLP Time Calculation (min) 75 min  -TM      SLP Received On 05/18/18  -      SLP Goal Re-Cert Due Date 05/28/18  -        User Key  (r) = Recorded By, (t) = Taken By, (c) = Cosigned By     Initials Name Provider Type    TM FLY Savage Speech and Language Pathologist          Therapy Charges for Today     Code Description Service Date Service Provider Modifiers Qty    08338149351 HC ST SWALLOWING CURRENT STATUS 5/18/2018 FLY Savage GN, CI 1    38332797022 HC ST SWALLOWING PROJECTED 5/18/2018 FLY Savage GN, CI 1    46538657540 HC ST EVAL ORAL PHARYNG SWALLOW 5 5/18/2018 FLY Savage GN, KX 1          SLP G-Codes  SLP NOMS Used?: Yes  Functional Limitations: Swallowing  Swallow Current Status (): At least 1 percent but less than 20 percent impaired, limited or restricted  Swallow Goal Status (): At least 1 percent but less than 20 percent impaired, limited or restricted    FLY Haynes  5/18/2018

## 2018-05-18 NOTE — THERAPY DISCHARGE NOTE
Acute Care - Speech Language Pathology Discharge Summary  [facility]       Patient Name: Marina Hyman  : 1935  MRN: [id number]    Today's Date: 2018  Onset of Illness/Injury or Date of Surgery: 18       Referring Physician: CARLEY Flores        Admit Date: 2018      SLP Recommendation and Plan  Mechanical soft solids and thin liquids    Visit Dx:    ICD-10-CM ICD-9-CM   1. Cellulitis of lower extremity, unspecified laterality L03.119 682.6   2. Fever, unspecified fever cause R50.9 780.60   3. Altered mental status, unspecified altered mental status type R41.82 780.97   4. Impaired mobility and ADLs Z74.09 799.89   5. Oropharyngeal dysphagia R13.12 787.22               Time Calculation- SLP     Row Name 18 1224             Time Calculation- SLP    SLP Start Time 1030  -TM      SLP Stop Time 1145  -TM      SLP Time Calculation (min) 75 min  -TM      SLP Received On 18  -      SLP Goal Re-Cert Due Date 18  -        User Key  (r) = Recorded By, (t) = Taken By, (c) = Cosigned By    Initials Name Provider Type     Kellen Almonte CCC-SLP Speech and Language Pathologist                  SLP GOALS     Row Name 18 1600 18 1030          Oral Nutrition/Hydration Goal 1 (SLP)    Oral Nutrition/Hydration Goal 1, SLP Pt will tolerate LRD without overt s/s of aspiration.  -MB Pt will tolerate LRD without overt s/s of aspiration.  -TM     Time Frame (Oral Nutrition/Hydration Goal 1, SLP) by discharge  -MB by discharge  -TM     Barriers (Oral Nutrition/Hydration Goal 1, SLP) Cognition, decreased motivation  -MB Cognition, decreased motivation  -TM     Progress/Outcomes (Oral Nutrition/Hydration Goal 1, SLP) goal not met  -MB goal ongoing  -TM       User Key  (r) = Recorded By, (t) = Taken By, (c) = Cosigned By    Initials Name Provider Type    MB Cholo Adamson CCC-SLP Speech and Language Pathologist    TM Kellen Almonte CCC-SLP Speech and Language  Pathologist                  SLP Discharge Summary  Anticipated Dischage Disposition: skilled nursing facility  Reason for Discharge: discharge from this facility  Progress Toward Achieving Short/long Term Goals: discharge on same date as initial evaluation  Discharge Destination: home      Cholo Adamson CCC-SLP  5/18/2018

## 2018-05-18 NOTE — PROGRESS NOTES
Continued Stay Note   [facility]     Patient Name: Marina Hyman  MRN: [id number]  Today's Date: 5/18/2018    Admit Date: 5/17/2018          Discharge Plan     Row Name 05/18/18 1512       Plan    Plan Home    Patient/Family in Agreement with Plan yes    Final Discharge Disposition Code 01 - home or self-care    Final Note Pt is being d/c'ed home today. Daughter may take her to [facility].               Discharge Codes    No documentation.       Expected Discharge Date and Time     Expected Discharge Date Expected Discharge Time    May 18, 2018             DADA Tomlin

## 2018-05-18 NOTE — PLAN OF CARE
"Problem: Patient Care Overview  Goal: Plan of Care Review  Outcome: Ongoing (interventions implemented as appropriate)   05/18/18 1021   Coping/Psychosocial   Plan of Care Reviewed With patient;daughter   Plan of Care Review   Progress no change   OTHER   Outcome Summary OT eval completed. Pt reports pain in RUE but unable to rate. [place] quiroz 2 at rest-4 with movement, RN notified. Pt does not respond to orientation questions but responds to name. Pt prefers to go by \"bruce Lidia\". Pt was initially max/dependent to roll R, to the L pt gave more effore and was only Min A. Pt's daughter stated pt had been moving in bed independently over night and works best with \"dubon voices\" She also reports her mother pretends to be asleep if its tasks she does not want to participate in. Pt was dependent to adjust socks in supine. Pt is pleasantly confused. Pt would benefit from skilled OT to address safety, adls and functional mobiltiy. recommended d/c to SNF for continued therapy.         "

## 2018-05-18 NOTE — PLAN OF CARE
Problem: Patient Care Overview  Goal: Plan of Care Review  Outcome: Ongoing (interventions implemented as appropriate)   05/18/18 1222   Coping/Psychosocial   Plan of Care Reviewed With daughter;other (see comments)  (RN, Mary)   Plan of Care Review   Progress (Eval)   OTHER   Outcome Summary Bedside swallow eval completed. Pt was presented pureed, thin, and regular solid consistencies only, as the pt required mod to max cues and encouragement to participate. Pt was noted to have odd behaviors when attempting to accept bolus from spoon, with small boluses consumed, with inconsistency in labial closure around spoon. Pt c/o dislike for specific food item being trialed (applesauce); therefore, ST changed to pudding thick orange juice, as the daughter felt the pt would be more receptive to such taste. Pt continued to c/o of dislike in taste, with facial grimacing noted, only consuming small boluses. Therefore, ST changed to thin liquids, consumed via straw. Pt was noted to have mild oral holding of the bolus, felt to be exacerbated by pt's decreased interest in PO intake. Difficulty ensuring that pt had completed a full swallow x1. With regular solid, pt was noted to have mild difficulty biting from cracker, with decreased rotary chew and anterior mastication of the bolus. No noted oral residue post swallow. No overt s/s of aspiration. Spoke with daughter, abdullahi Iglesias: possible concerns. Daughter denied concern with dysphagia at this time. Cannot fully r/o aspiration at this time, yet feel swallow fx is at baseline at this time. RECOMMENDATIONS: Mechanical soft diet consistency with regular/thin liquid consistency; meds whole with thin liquids; RN to monitor for increased congestion; supervision with PO intake, though daughter stated pt typically feeds self without difficulty; general feeding/aspiration precautions. ST to monitor for ensure diet toleration. Thanks!

## 2018-05-18 NOTE — NURSING NOTE
Patient presents with wounds on bilateral legs.  Daughter at bedside.  Patient does not answer questions.  Wounds on bilateral lower extremities appear to be healing traumatic wounds from a fall at assisted living facility.  Two small areas with edges approximated.  Legs are edematous, weeping from wound on left leg.  Suggested covering with foam dressing to absorb moisture.  ROJAS Hernandez stated no other needs.  No need for follow up.

## 2018-05-18 NOTE — DISCHARGE SUMMARY
[facility] Medicine Services  DISCHARGE SUMMARY       Date of Admission: 5/17/2018  Date of Discharge:  5/18/2018  Primary Care Physician: Mel Abraham MD    Presenting Problem/History of Present Illness:  Cellulitis of lower extremity, unspecified laterality [L03.119]     Final Discharge Diagnoses:  Hospital Problem List     Cellulitis of lower extremity          Pertinent Test Results:   Impression: Carotid duplex  1. There is less than 50% stenosis of the right internal carotid artery.  2. There is less than 50% stenosis of the left internal carotid artery.  3. Antegrade flow is demonstrated in bilateral vertebral arteries.     Summary: MRI the brain  1. Atrophy and small vessel disease.  2. No acute abnormality is seen.    Venous Doppler lower extremities  Impression: There is no evidence of deep venous thrombosis or  superficial thrombophlebitis of right or left lower extremities.    Summary: CT in 0 of chest  1. No pulmonary embolism.  2. Chronic lung changes with no acute abnormality.    Summary: CT scan ahead  1. Stable chronic change with no acute intracranial abnormality.    Summary: Chest x-ray  1. Stable chronic change.    Echocardiogram is pending.    Chief Complaint on Day of Discharge: none    History of Present Illness on Day of Discharge:   Marina Hyman is an 83-year-old  female who currently resides at the [facility].  Patient's daughter is at bedside and provides history of present illness.  Apparently patient was in her usual state of health 4 days ago however this am staff found her in a chair, refusing to eat or talk.  Her only comments were that she was freezing and she wanted to get warm.  She had put on a pair of gloves and was covered with multiple blankets.  She has a past medical history of dementia, diabetes mellitus type II, congestive heart failure, L2 fracture-2 years ago followed by Dr. YASMIN bauman her receiving Lortabs and  "injections.  She is also had chronic bilateral lower extremity wounds from skin tears after a fall several months ago this is been followed primary care provider Dr. Mel Abraham.  Currently lower extremities are erythemic without blistering or drainage, questionable cellulitis patient continues to not follow commands or talk she does respond to painful stimuli and will suddenly awaken and say her right leg is hurting.  Palpating the right leg did cause her to pull back in pain and with grimacing.  Patient's daughter states her mother has a tendency to play possible.  Daughter states she feels her mother eats to be placed in a nursing home at this time as she has become somewhat combative at assisted living, has escaped twice.  And does not not like to be alone.    Hospital Course:  The patient is a 83 y.o. female who presented to [facility] with altered mental status.  MRI and CT scan of brain shows no acute changes.  CT angiogram shows no significant stenosis, bilateral.tient altered mental status is back to baseline.  Chronic history of end-stage Alzheimer dementia.    Cellulitis lower extremities.  Patient was started on Cleocin.   Doppler ultrasound Lower extremities which shows no thrombus.  Upon evaluated in lower extremities.  There is 2 small wound/scabbed in the midshin regions healing nicely.  No need for antibiotics at this time.     Diabetes- Hba1c 5.6.  Patient's fairly well controlled.  Patient is not eating well in the first place.  Plan to stop Glucophage for now.    Daughter wants to take the patient to psych unit at [facility] for further management and evaluation of dementia.  Patient will be admitted there for 3 days inpatient, per daughter.    Condition on Discharge:  stable    Physical Exam on Discharge:  BP (!) 99/33 (BP Location: Left arm, Patient Position: Lying)   Pulse 74   Temp 97.6 °F (36.4 °C) (Temporal Artery )   Resp 16   Ht 152.4 cm (60\")   Wt 56.7 kg (125 lb) "   SpO2 94%   BMI 24.41 kg/m²   Physical Exam   Constitutional: She is oriented to person, place, and time. She appears well-developed.   HENT:   Head: Normocephalic and atraumatic.   Eyes: Conjunctivae and EOM are normal. Pupils are equal, round, and reactive to light.   Neck: Neck supple. No JVD present. No thyromegaly present.   Cardiovascular: Normal rate, regular rhythm, normal heart sounds and intact distal pulses.  Exam reveals no gallop and no friction rub.    No murmur heard.  Pulmonary/Chest: Effort normal and breath sounds normal. No respiratory distress. She has no wheezes. She has no rales. She exhibits no tenderness.   Abdominal: Soft. Bowel sounds are normal. She exhibits no distension. There is no tenderness. There is no rebound and no guarding.   Musculoskeletal: Normal range of motion. She exhibits no edema, tenderness or deformity.   Lymphadenopathy:     She has no cervical adenopathy.   Neurological: She is alert and oriented to person, place, and time. She displays normal reflexes. No cranial nerve deficit. She exhibits normal muscle tone. Coordination abnormal.   Skin: Skin is warm and dry. No rash noted.   Anterior shin region, bilateral, mall wound/scabbed.  No sign of cellulitis.Cool to touch.    Psychiatric: She has a normal mood and affect.   Nursing note and vitals reviewed.        Discharge Disposition:  Home or Self Care    Discharge Medications:   Marina Hyman   Home Medication Instructions SHYANN:[id number]    Printed on:05/18/18 3665   Medication Information                      aspirin 81 MG EC tablet  Take 81 mg by mouth Daily.             ondansetron (ZOFRAN) 4 MG tablet  Take 1 tablet by mouth Every 6 (Six) Hours As Needed for Nausea or Vomiting.             rosuvastatin (CRESTOR) 20 MG tablet  Take 20 mg by mouth Daily.                 Discharge Diet:   Diet Instructions     Advance Diet As Tolerated             Activity at Discharge:   Activity Instructions     Activity  as Tolerated             Discharge Care Plan/Instructions:     Follow-up Appointments:   Follow-up with psychiatry at [facility].  Follow-up with primary care doctor 1 week.    Lambert Yu MD  05/18/18  2:38 PM    Time: Greater than 30 minutes

## 2018-05-18 NOTE — THERAPY EVALUATION
Acute Care - Occupational Therapy Initial Evaluation  [facility]     Patient Name: Marina Hyman  : 1935  MRN: [id number]  Today's Date: 2018  Onset of Illness/Injury or Date of Surgery: 18  Date of Referral to OT: 18  Referring Physician: CARLEY Flores    Admit Date: 2018       ICD-10-CM ICD-9-CM   1. Cellulitis of lower extremity, unspecified laterality L03.119 682.6   2. Fever, unspecified fever cause R50.9 780.60   3. Altered mental status, unspecified altered mental status type R41.82 780.97   4. Impaired mobility and ADLs Z74.09 799.89     Patient Active Problem List   Diagnosis   • ELIECER (acute kidney injury)   • Cellulitis of lower extremity     Past Medical History:   Diagnosis Date   • CHF (congestive heart failure)    • Diabetes mellitus      Past Surgical History:   Procedure Laterality Date   • AORTIC VALVE REPAIR/REPLACEMENT            OT ASSESSMENT FLOWSHEET (last 72 hours)      Occupational Therapy Evaluation     Row Name 18 0750                   OT Evaluation Time/Intention    Subjective Information complains of;weakness;fatigue;pain  -MM        Document Type evaluation   see MAR  -MM        Mode of Treatment occupational therapy  -MM           General Information    Patient Profile Reviewed? yes  -MM        Onset of Illness/Injury or Date of Surgery 18  -MM        Referring Physician CARLEY Flores  -MM        Patient Observations poorly cooperative;agree to therapy;alert  -MM        Patient/Family Observations family present  -MM        General Observations of Patient fowlers, IV RUE, some redness noted at IV site: RN notified.  -MM        Prior Level of Function independent:;all household mobility;community mobility;transfer;bed mobility;feeding;grooming;mod assist:;dressing;bathing  -MM        Equipment Currently Used at Home rollator  -MM        Pertinent History of Current Functional Problem Admitted with coldness and refusing to  "eat/drink. Dx: AMS, cellulitis BLE, CHF, dementia, fever. 5/17 H&H 10.7 & 32.8.  -MM        Existing Precautions/Restrictions fall  -MM        Limitations/Impairments safety/cognitive  -MM        Risks Reviewed patient:;LOB;nausea/vomiting;dizziness;increased discomfort  -MM        Benefits Reviewed patient:;improve function;increase independence;increase strength;increase balance;decrease pain  -MM        Barriers to Rehab cognitive status;previous functional deficit  -MM           Relationship/Environment    Primary Source of Support/Comfort child(flip)   staff at facility  -MM        Lives With --  -MM           Resource/Environmental Concerns    Current Living Arrangements extended care [facility]  -           Cognitive Assessment/Interventions    Additional Documentation Cognitive Assessment/Intervention (Group)  -MM           Cognitive Assessment/Intervention- PT/OT    Affect/Mental Status (Cognitive) confused  -MM        Orientation Status (Cognition) refused to attempt   Pt would not respond to orientation ?s but responded to name  -MM        Follows Commands (Cognition) 25-49% accuracy;follows one step commands;increased processing time needed;physical/tactile prompts required;repetition of directions required;verbal cues/prompting required  -MM        Safety Deficit (Cognitive) moderate deficit;ability to follow commands;at risk behavior observed;awareness of need for assistance;impulsivity;insight into deficits/self awareness;judgment  -MM        Personal Safety Interventions fall prevention program maintained;muscle strengthening facilitated;nonskid shoes/slippers when out of bed;supervised activity  -MM        Cognitive Assessment/Intervention Comment Pt pretends to be asleep during eval. Pt's daughter reports pt does this when she thinks staff is going to ask her to do something she doesn't want to. Daughter said pt works best if you are \"dubon\". Daughter repots pt was attempting to get out of bed " on own last night.  -MM           Safety Issues, Functional Mobility    Safety Issues Affecting Function (Mobility) ability to follow commands;at risk behavior observed;awareness of need for assistance;impulsivity;insight into deficits/self awareness;judgment  -MM        Impairments Affecting Function (Mobility) cognition;endurance/activity tolerance  -MM           Bed Mobility Assessment/Treatment    Bed Mobility Assessment/Treatment rolling left;rolling right  -MM        Rolling Left [place] (Bed Mobility) minimum assist (75% patient effort)  -MM        Rolling Right [place] (Bed Mobility) maximum assist (25% patient effort);dependent (less than 25% patient effort)   Pt minimally participated to this side  -MM        Comment (Bed Mobility) Pt refused further mobility. Pt's daughter reports pt has been moving in bed independently.  -MM           ADL Assessment/Intervention    BADL Assessment/Intervention lower body dressing  -MM           Lower Body Dressing Assessment/Training    Lower Body Dressing [place] Level socks;dependent (less than 25% patient effort)   adjust socks  -MM        Lower Body Dressing Position supine  -MM           General ROM    GENERAL ROM COMMENTS AROM appears to WNL BUE/BLE  -MM           General Assessment (Manual Muscle Testing)    Comment, General Manual Muscle Testing (MMT) Assessment unable to formally test 2' to cognition  -MM           Positioning and Restraints    Pre-Treatment Position in bed  -MM        Post Treatment Position bed  -MM        In Bed notified nsg;fowlers;call light within reach;encouraged to call for assist;with family/caregiver;exit alarm on;side rails up x2  -MM           Pain Assessment    Additional Documentation Pain Scale: FACES Pre/Post-Treatment (Group)  -MM           Pain Scale: Numbers Pre/Post-Treatment    Pain Location - Side Right  -MM        Pain Location - Orientation upper  -MM        Pain Location extremity  -MM        Pain  Intervention(s) Repositioned   RN notified  -MM           Pain Scale: FACES Pre/Post-Treatment    Pain: FACES Scale, Pretreatment 2-->hurts little bit  -MM        Pain: FACES Scale, Post-Treatment 4-->hurts little more  -MM           Plan of Care Review    Plan of Care Reviewed With patient;daughter  -MM           Clinical Impression (OT)    Date of Referral to OT 05/17/18  -MM        OT Diagnosis impaired mobility and adls  -MM        Functional Level at Time of Evaluation (OT Eval) fair  -MM        Patient/Family Goals Statement (OT Eval) pt did not state  -MM        Criteria for Skilled Therapeutic Interventions Met (OT Eval) yes;treatment indicated  -MM        Rehab Potential (OT Eval) fair, will monitor progress closely  -MM        Therapy Frequency (OT Eval) 3 times/wk  -MM        Predicted Duration of Therapy Intervention (OT Eval) until d/c  -MM        Care Plan Review (OT) evaluation/treatment results reviewed;care plan/treatment goals reviewed;risks/benefits reviewed;current/potential barriers reviewed;patient/other agree to care plan  -MM        Care Plan Review, Other Participant (OT Eval) daughter  -MM        Anticipated Discharge Disposition (OT) skilled nursing facility (SNF)  -MM           Planned OT Interventions    Planned Therapy Interventions (OT Eval) activity tolerance training;BADL retraining;functional balance retraining;occupation/activity based interventions;passive ROM/stretching;ROM/therapeutic exercise;strengthening exercise;transfer/mobility retraining;patient/caregiver education/training  -MM           OT Goals    Transfer Goal Selection (OT) transfer, OT goal 1  -MM        Toileting Goal Selection (OT) toileting, OT goal 1  -MM        Grooming Goal Selection (OT) grooming, OT goal 1  -MM        Additional Documentation Grooming Goal Selection (OT) (Row)  -MM           Transfer Goal 1 (OT)    Activity/Assistive Device (Transfer Goal 1, OT) transfers, all;rollator  -MM         [place] Level/Cues Needed (Transfer Goal 1, OT) minimum assist (75% or more patient effort)  -MM        Time Frame (Transfer Goal 1, OT) 10 days  -MM        Barriers (Transfers Goal 1, OT) cognition  -MM        Progress/Outcome (Transfer Goal 1, OT) goal ongoing  -MM           Toileting Goal 1 (OT)    Activity/Device (Toileting Goal 1, OT) toileting skills, all;commode, bedside with drop arms  -MM        [place] Level/Cues Needed (Toileting Goal 1, OT) minimum assist (75% or more patient effort)  -MM        Time Frame (Toileting Goal 1, OT) 10 days  -MM        Progress/Outcome (Toileting Goal 1, OT) goal ongoing  -MM           Grooming Goal 1 (OT)    Activity/Device (Grooming Goal 1, OT) grooming skills, all  -MM        [place] (Grooming Goal 1, OT) minimum assist (75% or more patient effort)  -MM        Time Frame (Grooming Goal 1, OT) 10 days  -MM        Progress/Outcome (Grooming Goal 1, OT) goal ongoing  -MM          User Key  (r) = Recorded By, (t) = Taken By, (c) = Cosigned By    Initials Name Effective Dates    MM BENITEZ Eisenberg/SCOTTIE 04/03/18 -            Occupational Therapy Education     Title: PT OT SLP Therapies (Active)     Topic: Occupational Therapy (Active)     Point: ADL training (Active)     Description: Instruct learner(s) on proper safety adaptation and remediation techniques during self care or transfers.   Instruct in proper use of assistive devices.   Learning Progress Summary     Learner Status Readiness Method Response Comment Documented by    Patient Active Acceptance E NR OT role, benefits, POC, safety concerns  05/18/18 1021    Family Active Acceptance E NR OT role, benefits, POC, safety concerns  05/18/18 1021                      User Key     Initials Effective Dates Name Provider Type Discipline     04/03/18 -  Amrik Miller OTR/L Occupational Therapist OT                  OT Recommendation and Plan  Outcome Summary/Treatment Plan (OT)  Anticipated Discharge  "Disposition (OT): skilled nursing facility (SNF)  Planned Therapy Interventions (OT Eval): activity tolerance training, BADL retraining, functional balance retraining, occupation/activity based interventions, passive ROM/stretching, ROM/therapeutic exercise, strengthening exercise, transfer/mobility retraining, patient/caregiver education/training  Therapy Frequency (OT Eval): 3 times/wk  Plan of Care Review  Plan of Care Reviewed With: patient, daughter  Plan of Care Reviewed With: patient, daughter  Outcome Summary: OT eval completed. Pt reports pain in RUE but unable to rate. [place] quiroz 2 at rest-4 with movement, RN notified. Pt does not respond to orientation questions but responds to name. Pt prefers to go by \"bruce Murillo\". Pt was initially max/dependent to roll R, to the L pt gave more effore and was only Min A. Pt's daughter stated pt had been moving in bed independently over night and works best with \"dubon voices\" She also reports her mother pretends to be asleep if its tasks she does not want to participate in. Pt was dependent to adjust socks in supine. Pt is pleasantly confused. Pt would benefit from skilled OT to address safety, adls and functional mobiltiy. recommended d/c to SNF for continued therapy.          Outcome Measures     Row Name 05/18/18 1000             How much help from another is currently needed...    Putting on and taking off regular lower body clothing? 1  -MM      Bathing (including washing, rinsing, and drying) 1  -MM      Toileting (which includes using toilet bed pan or urinal) 1  -MM      Putting on and taking off regular upper body clothing 2  -MM      Taking care of personal grooming (such as brushing teeth) 2  -MM      Eating meals 2  -MM      Score 9  -MM         Functional Assessment    Outcome Measure Options AM-PAC 6 Clicks Daily Activity (OT)  -MM        User Key  (r) = Recorded By, (t) = Taken By, (c) = Cosigned By    Initials Name Provider Type    HUBER Miller, " OTR/L Occupational Therapist          Time Calculation:   OT Start Time: 0750  OT Stop Time: 0828  OT Time Calculation (min): 38 min    Therapy Charges for Today     Code Description Service Date Service Provider Modifiers Qty    [id number]  OT SELFCARE CURRENT 5/18/2018 Amrik Miller OTR/L GO, CL 1    [id number]  OT SELFCARE PROJECTED 5/18/2018 Amrik Miller OTR/L GO, CK 1    [id number]  OT EVAL MOD COMPLEXITY 3 5/18/2018 Amrik Miller OTR/L GO, KX 1          OT G-codes  OT Professional Judgement Used?: Yes  OT Functional Scales Options: AM-PAC 6 Clicks Daily Activity (OT)  Score: 9  Functional Limitation: Self care  Self Care Current Status (): At least 60 percent but less than 80 percent impaired, limited or restricted  Self Care Goal Status (): At least 40 percent but less than 60 percent impaired, limited or restricted    BENITEZ Foster/SCOTTIE  5/18/2018

## 2018-05-19 NOTE — THERAPY DISCHARGE NOTE
Acute Care - Occupational Therapy Discharge Summary  [facility]     Patient Name: Marina Hyman  : 1935  MRN: [id number]    Today's Date: 2018  Onset of Illness/Injury or Date of Surgery: 18    Date of Referral to OT: 18  Referring Physician: CARLEY Flores      Admit Date: 2018        OT Recommendation and Plan    Visit Dx:    ICD-10-CM ICD-9-CM   1. Cellulitis of lower extremity, unspecified laterality L03.119 682.6   2. Fever, unspecified fever cause R50.9 780.60   3. Altered mental status, unspecified altered mental status type R41.82 780.97   4. Impaired mobility and ADLs Z74.09 799.89   5. Oropharyngeal dysphagia R13.12 787.22                     OT Rehab Goals     Row Name 18 1300             Transfer Goal 1 (OT)    Activity/Assistive Device (Transfer Goal 1, OT) transfers, all;rollator  -      [place] Level/Cues Needed (Transfer Goal 1, OT) minimum assist (75% or more patient effort)  -      Time Frame (Transfer Goal 1, OT) 10 days  -      Barriers (Transfers Goal 1, OT) cognition  -      Progress/Outcome (Transfer Goal 1, OT) goal not met  -         Toileting Goal 1 (OT)    Activity/Device (Toileting Goal 1, OT) toileting skills, all;commode, bedside with drop arms  -      [place] Level/Cues Needed (Toileting Goal 1, OT) minimum assist (75% or more patient effort)  -      Time Frame (Toileting Goal 1, OT) 10 days  -      Progress/Outcome (Toileting Goal 1, OT) goal not met  -         Grooming Goal 1 (OT)    Activity/Device (Grooming Goal 1, OT) grooming skills, all  -      [place] (Grooming Goal 1, OT) minimum assist (75% or more patient effort)  -      Time Frame (Grooming Goal 1, OT) 10 days  -      Progress/Outcome (Grooming Goal 1, OT) goal not met  -        User Key  (r) = Recorded By, (t) = Taken By, (c) = Cosigned By    Initials Name Provider Type Discipline    RANDELL Cole/L Occupational Therapy  Assistant OT                Outcome Measures     Row Name 05/18/18 1000             How much help from another is currently needed...    Putting on and taking off regular lower body clothing? 1  -MM      Bathing (including washing, rinsing, and drying) 1  -MM      Toileting (which includes using toilet bed pan or urinal) 1  -MM      Putting on and taking off regular upper body clothing 2  -MM      Taking care of personal grooming (such as brushing teeth) 2  -MM      Eating meals 2  -MM      Score 9  -MM         Functional Assessment    Outcome Measure Options AM-PAC 6 Clicks Daily Activity (OT)  -MM        User Key  (r) = Recorded By, (t) = Taken By, (c) = Cosigned By    Initials Name Provider Type    HUBER Miller OTR/L Occupational Therapist              OT Discharge Summary  Reason for Discharge: Discharge from facility  Outcomes Achieved: Refer to plan of care for updates on goals achieved  Discharge Destination: Home      RAQUEL Augustin  5/19/2018

## 2018-05-21 ENCOUNTER — TELEPHONE (OUTPATIENT)
Dept: INTERNAL MEDICINE | Age: 83
End: 2018-05-21

## 2018-05-22 LAB
BACTERIA SPEC AEROBE CULT: NORMAL
BACTERIA SPEC AEROBE CULT: NORMAL

## 2018-05-30 ENCOUNTER — TELEPHONE (OUTPATIENT)
Dept: CARDIOLOGY | Age: 83
End: 2018-05-30

## 2018-06-24 ENCOUNTER — APPOINTMENT (OUTPATIENT)
Dept: CT IMAGING | Age: 83
End: 2018-06-24
Payer: MEDICARE

## 2018-06-24 ENCOUNTER — HOSPITAL ENCOUNTER (EMERGENCY)
Age: 83
Discharge: HOME | End: 2018-06-24
Payer: MEDICARE

## 2018-06-24 VITALS
TEMPERATURE: 97.9 F | DIASTOLIC BLOOD PRESSURE: 67 MMHG | OXYGEN SATURATION: 97 % | RESPIRATION RATE: 16 BRPM | HEART RATE: 76 BPM | SYSTOLIC BLOOD PRESSURE: 108 MMHG

## 2018-06-24 DIAGNOSIS — S09.90XA CLOSED HEAD INJURY, INITIAL ENCOUNTER: ICD-10-CM

## 2018-06-24 DIAGNOSIS — S51.011A SKIN TEAR OF RIGHT ELBOW WITHOUT COMPLICATION, INITIAL ENCOUNTER: ICD-10-CM
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
## 2018-06-24 PROCEDURE — 99283 EMERGENCY DEPT VISIT LOW MDM: CPT

## 2018-06-24 PROCEDURE — 72125 CT NECK SPINE W/O DYE: CPT

## 2018-06-24 PROCEDURE — 99284 EMERGENCY DEPT VISIT MOD MDM: CPT | Performed by: NURSE PRACTITIONER

## 2018-06-24 PROCEDURE — 70450 CT HEAD/BRAIN W/O DYE: CPT

## 2018-06-24 ASSESSMENT — ENCOUNTER SYMPTOMS
SHORTNESS OF BREATH: 0
BACK PAIN: 0
DIARRHEA: 0
ABDOMINAL PAIN: 0
CONSTIPATION: 0
VOMITING: 0
NAUSEA: 0